# Patient Record
Sex: FEMALE | Race: WHITE | NOT HISPANIC OR LATINO | Employment: UNEMPLOYED | URBAN - METROPOLITAN AREA
[De-identification: names, ages, dates, MRNs, and addresses within clinical notes are randomized per-mention and may not be internally consistent; named-entity substitution may affect disease eponyms.]

---

## 2017-03-22 ENCOUNTER — TRANSCRIBE ORDERS (OUTPATIENT)
Dept: WOMENS IMAGING | Facility: HOSPITAL | Age: 42
End: 2017-03-22

## 2017-03-22 DIAGNOSIS — O09.291: ICD-10-CM

## 2017-03-22 DIAGNOSIS — Z86.79 HISTORY OF ATRIAL FIBRILLATION: ICD-10-CM

## 2017-03-22 DIAGNOSIS — O09.521 AMA (ADVANCED MATERNAL AGE) MULTIGRAVIDA 35+, FIRST TRIMESTER: Primary | ICD-10-CM

## 2017-04-12 ENCOUNTER — OFFICE VISIT (OUTPATIENT)
Dept: OBSTETRICS AND GYNECOLOGY | Facility: HOSPITAL | Age: 42
End: 2017-04-12

## 2017-04-12 ENCOUNTER — HOSPITAL ENCOUNTER (OUTPATIENT)
Dept: WOMENS IMAGING | Facility: HOSPITAL | Age: 42
Discharge: HOME | End: 2017-04-12
Admitting: NURSE PRACTITIONER

## 2017-04-12 VITALS
HEIGHT: 66 IN | BODY MASS INDEX: 36.96 KG/M2 | DIASTOLIC BLOOD PRESSURE: 71 MMHG | SYSTOLIC BLOOD PRESSURE: 114 MMHG | WEIGHT: 230 LBS

## 2017-04-12 DIAGNOSIS — O09.521 AMA (ADVANCED MATERNAL AGE) MULTIGRAVIDA 35+, FIRST TRIMESTER: Primary | ICD-10-CM

## 2017-04-12 DIAGNOSIS — F11.20 OPIOID DEPENDENCE ON MAINTENANCE AGONIST THERAPY, NO SYMPTOMS (HCC): ICD-10-CM

## 2017-04-12 DIAGNOSIS — Z86.79 HISTORY OF ATRIAL FIBRILLATION: ICD-10-CM

## 2017-04-12 DIAGNOSIS — O34.219 PREVIOUS CESAREAN DELIVERY AFFECTING PREGNANCY: ICD-10-CM

## 2017-04-12 DIAGNOSIS — O09.299 PREVIOUS STILLBIRTH OR DEMISE, ANTEPARTUM, UNSPECIFIED TRIMESTER: ICD-10-CM

## 2017-04-12 DIAGNOSIS — O09.291: ICD-10-CM

## 2017-04-12 DIAGNOSIS — O09.521 AMA (ADVANCED MATERNAL AGE) MULTIGRAVIDA 35+, FIRST TRIMESTER: ICD-10-CM

## 2017-04-12 PROBLEM — O09.529 AMA (ADVANCED MATERNAL AGE) MULTIGRAVIDA 35+: Status: ACTIVE | Noted: 2017-04-12

## 2017-04-12 PROCEDURE — 76801 OB US < 14 WKS SINGLE FETUS: CPT

## 2017-04-12 PROCEDURE — 76813 OB US NUCHAL MEAS 1 GEST: CPT | Performed by: OBSTETRICS & GYNECOLOGY

## 2017-04-12 PROCEDURE — 76801 OB US < 14 WKS SINGLE FETUS: CPT | Performed by: OBSTETRICS & GYNECOLOGY

## 2017-04-12 PROCEDURE — 76813 OB US NUCHAL MEAS 1 GEST: CPT

## 2017-04-12 PROCEDURE — 99241 PR OFFICE CONSULTATION NEW/ESTAB PATIENT 15 MIN: CPT | Performed by: OBSTETRICS & GYNECOLOGY

## 2017-04-12 RX ORDER — BUPRENORPHINE 2 MG/1
2 TABLET SUBLINGUAL DAILY
COMMUNITY
End: 2017-10-02

## 2017-04-12 RX ORDER — MONTELUKAST SODIUM 10 MG/1
10 TABLET ORAL NIGHTLY
COMMUNITY
End: 2021-01-01

## 2017-04-12 RX ORDER — ALBUTEROL SULFATE 90 UG/1
2 AEROSOL, METERED RESPIRATORY (INHALATION) EVERY 4 HOURS PRN
COMMUNITY

## 2017-04-12 RX ORDER — PRENATAL WITH FERROUS FUM AND FOLIC ACID 3080; 920; 120; 400; 22; 1.84; 3; 20; 10; 1; 12; 200; 27; 25; 2 [IU]/1; [IU]/1; MG/1; [IU]/1; MG/1; MG/1; MG/1; MG/1; MG/1; MG/1; UG/1; MG/1; MG/1; MG/1; MG/1
TABLET ORAL DAILY
Status: ON HOLD | COMMUNITY
End: 2020-12-02

## 2017-04-18 ENCOUNTER — TELEPHONE (OUTPATIENT)
Dept: WOMENS IMAGING | Facility: HOSPITAL | Age: 42
End: 2017-04-18

## 2017-04-18 NOTE — TELEPHONE ENCOUNTER
----- Message from Douglas A Milligan, MD sent at 4/18/2017  8:30 AM EDT -----  Please infor patient of results and fax to referring provider.

## 2017-05-31 ENCOUNTER — OFFICE VISIT (OUTPATIENT)
Dept: OBSTETRICS AND GYNECOLOGY | Facility: HOSPITAL | Age: 42
End: 2017-05-31

## 2017-05-31 ENCOUNTER — HOSPITAL ENCOUNTER (OUTPATIENT)
Dept: WOMENS IMAGING | Facility: HOSPITAL | Age: 42
Discharge: HOME | End: 2017-05-31
Attending: OBSTETRICS & GYNECOLOGY | Admitting: NURSE PRACTITIONER

## 2017-05-31 VITALS — WEIGHT: 225 LBS | DIASTOLIC BLOOD PRESSURE: 69 MMHG | SYSTOLIC BLOOD PRESSURE: 115 MMHG | BODY MASS INDEX: 36.87 KG/M2

## 2017-05-31 DIAGNOSIS — O09.522 AMA (ADVANCED MATERNAL AGE) MULTIGRAVIDA 35+, SECOND TRIMESTER: Primary | ICD-10-CM

## 2017-05-31 DIAGNOSIS — O34.219 PREVIOUS CESAREAN DELIVERY AFFECTING PREGNANCY: ICD-10-CM

## 2017-05-31 DIAGNOSIS — O09.521 AMA (ADVANCED MATERNAL AGE) MULTIGRAVIDA 35+, FIRST TRIMESTER: ICD-10-CM

## 2017-05-31 DIAGNOSIS — F11.20 OPIOID DEPENDENCE ON MAINTENANCE AGONIST THERAPY, NO SYMPTOMS (HCC): ICD-10-CM

## 2017-05-31 DIAGNOSIS — O09.299 PREVIOUS STILLBIRTH OR DEMISE, ANTEPARTUM, UNSPECIFIED TRIMESTER: ICD-10-CM

## 2017-05-31 PROCEDURE — 76811 OB US DETAILED SNGL FETUS: CPT | Performed by: OBSTETRICS & GYNECOLOGY

## 2017-05-31 PROCEDURE — 76811 OB US DETAILED SNGL FETUS: CPT

## 2017-06-28 ENCOUNTER — HOSPITAL ENCOUNTER (OUTPATIENT)
Dept: WOMENS IMAGING | Facility: HOSPITAL | Age: 42
Discharge: HOME | End: 2017-06-28
Attending: OBSTETRICS & GYNECOLOGY | Admitting: NURSE PRACTITIONER

## 2017-06-28 ENCOUNTER — OFFICE VISIT (OUTPATIENT)
Dept: OBSTETRICS AND GYNECOLOGY | Facility: HOSPITAL | Age: 42
End: 2017-06-28

## 2017-06-28 VITALS — SYSTOLIC BLOOD PRESSURE: 112 MMHG | DIASTOLIC BLOOD PRESSURE: 69 MMHG | WEIGHT: 223 LBS | BODY MASS INDEX: 36.54 KG/M2

## 2017-06-28 DIAGNOSIS — F11.20 OPIOID DEPENDENCE ON MAINTENANCE AGONIST THERAPY, NO SYMPTOMS (HCC): ICD-10-CM

## 2017-06-28 DIAGNOSIS — O09.292 PREVIOUS STILLBIRTH OR DEMISE, ANTEPARTUM, SECOND TRIMESTER: ICD-10-CM

## 2017-06-28 DIAGNOSIS — O09.522 AMA (ADVANCED MATERNAL AGE) MULTIGRAVIDA 35+, SECOND TRIMESTER: ICD-10-CM

## 2017-06-28 DIAGNOSIS — O34.219 PREVIOUS CESAREAN DELIVERY AFFECTING PREGNANCY: ICD-10-CM

## 2017-06-28 DIAGNOSIS — O09.522 AMA (ADVANCED MATERNAL AGE) MULTIGRAVIDA 35+, SECOND TRIMESTER: Primary | ICD-10-CM

## 2017-06-28 PROCEDURE — 76816 OB US FOLLOW-UP PER FETUS: CPT | Performed by: OBSTETRICS & GYNECOLOGY

## 2017-06-28 PROCEDURE — 76816 OB US FOLLOW-UP PER FETUS: CPT

## 2017-06-28 NOTE — PROGRESS NOTES
"Pt denies problems with pregnancy. Reports \"having trouble with asthma\" \"need nebulizer machine and albuterol.\" states \"my doctor won't give me my subutex anymore because he said I lied to him\". \"have been weaning myself off \". To see OB July 13 and will do glucose testing at that time.  "

## 2017-08-05 ENCOUNTER — HOSPITAL ENCOUNTER (EMERGENCY)
Facility: HOSPITAL | Age: 42
Discharge: HOME | End: 2017-08-05
Attending: FAMILY MEDICINE | Admitting: FAMILY MEDICINE

## 2017-08-05 VITALS
OXYGEN SATURATION: 98 % | DIASTOLIC BLOOD PRESSURE: 70 MMHG | RESPIRATION RATE: 18 BRPM | SYSTOLIC BLOOD PRESSURE: 100 MMHG | WEIGHT: 210 LBS | BODY MASS INDEX: 31.83 KG/M2 | HEART RATE: 70 BPM | TEMPERATURE: 98 F | HEIGHT: 68 IN

## 2017-08-05 DIAGNOSIS — T81.31XA WOUND DEHISCENCE, INITIAL ENCOUNTER: Primary | ICD-10-CM

## 2017-08-05 PROCEDURE — 99283 EMERGENCY DEPT VISIT LOW MDM: CPT

## 2017-08-05 RX ORDER — CLINDAMYCIN HYDROCHLORIDE 150 MG/1
300 CAPSULE ORAL EVERY 6 HOURS SCHEDULED
Status: DISCONTINUED | OUTPATIENT
Start: 2017-08-05 | End: 2017-08-05 | Stop reason: HOSPADM

## 2017-08-05 RX ORDER — CLINDAMYCIN HYDROCHLORIDE 300 MG/1
300 CAPSULE ORAL 3 TIMES DAILY
Qty: 30 CAPSULE | Refills: 0 | Status: SHIPPED | OUTPATIENT
Start: 2017-08-05 | End: 2017-08-15

## 2017-08-05 RX ADMIN — CLINDAMYCIN HYDROCHLORIDE 300 MG: 150 CAPSULE ORAL at 19:08
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
## 2017-08-30 ENCOUNTER — APPOINTMENT (OUTPATIENT)
Dept: WOMENS IMAGING | Facility: HOSPITAL | Age: 42
End: 2017-08-30
Attending: OBSTETRICS & GYNECOLOGY

## 2017-09-11 ENCOUNTER — APPOINTMENT (OUTPATIENT)
Dept: WOMENS IMAGING | Facility: HOSPITAL | Age: 42
End: 2017-09-11
Attending: OBSTETRICS & GYNECOLOGY

## 2017-10-02 ENCOUNTER — OFFICE VISIT (OUTPATIENT)
Dept: OBSTETRICS AND GYNECOLOGY | Facility: HOSPITAL | Age: 42
End: 2017-10-02

## 2017-10-02 ENCOUNTER — HOSPITAL ENCOUNTER (OUTPATIENT)
Dept: WOMENS IMAGING | Facility: HOSPITAL | Age: 42
Discharge: HOME | End: 2017-10-02
Admitting: NURSE PRACTITIONER

## 2017-10-02 VITALS — BODY MASS INDEX: 34.52 KG/M2 | DIASTOLIC BLOOD PRESSURE: 70 MMHG | SYSTOLIC BLOOD PRESSURE: 112 MMHG | WEIGHT: 227 LBS

## 2017-10-02 DIAGNOSIS — O36.5990 POOR FETAL GROWTH AFFECTING MANAGEMENT OF MOTHER, ANTEPARTUM, SINGLE OR UNSPECIFIED FETUS: ICD-10-CM

## 2017-10-02 DIAGNOSIS — O09.522 AMA (ADVANCED MATERNAL AGE) MULTIGRAVIDA 35+, SECOND TRIMESTER: ICD-10-CM

## 2017-10-02 DIAGNOSIS — F11.20 OPIOID DEPENDENCE ON MAINTENANCE AGONIST THERAPY, NO SYMPTOMS (HCC): Primary | ICD-10-CM

## 2017-10-02 DIAGNOSIS — O34.219 PREVIOUS CESAREAN DELIVERY AFFECTING PREGNANCY: ICD-10-CM

## 2017-10-02 DIAGNOSIS — O09.299 PREVIOUS STILLBIRTH OR DEMISE, ANTEPARTUM: ICD-10-CM

## 2017-10-02 DIAGNOSIS — O09.523 ELDERLY MULTIGRAVIDA IN THIRD TRIMESTER: ICD-10-CM

## 2017-10-02 PROCEDURE — 76819 FETAL BIOPHYS PROFIL W/O NST: CPT

## 2017-10-02 PROCEDURE — 76816 OB US FOLLOW-UP PER FETUS: CPT | Performed by: OBSTETRICS & GYNECOLOGY

## 2017-10-02 PROCEDURE — 76819 FETAL BIOPHYS PROFIL W/O NST: CPT | Performed by: OBSTETRICS & GYNECOLOGY

## 2017-10-02 PROCEDURE — 76816 OB US FOLLOW-UP PER FETUS: CPT

## 2017-10-02 NOTE — PROGRESS NOTES
Pt states she weaned herself off the Subutex. States she has not had any in 4 days.  Denies any complaints.

## 2018-07-08 ENCOUNTER — APPOINTMENT (OUTPATIENT)
Dept: GENERAL RADIOLOGY | Facility: HOSPITAL | Age: 43
End: 2018-07-08

## 2018-07-08 ENCOUNTER — HOSPITAL ENCOUNTER (EMERGENCY)
Facility: HOSPITAL | Age: 43
Discharge: HOME | End: 2018-07-09
Attending: EMERGENCY MEDICINE | Admitting: EMERGENCY MEDICINE

## 2018-07-08 DIAGNOSIS — R07.9 CHEST PAIN, UNSPECIFIED TYPE: Primary | ICD-10-CM

## 2018-07-08 DIAGNOSIS — R51.9 ACUTE NONINTRACTABLE HEADACHE, UNSPECIFIED HEADACHE TYPE: ICD-10-CM

## 2018-07-08 LAB
BACTERIA UR QL AUTO: NORMAL /HPF
BASOPHILS # BLD AUTO: 0.06 10*3/MM3 (ref 0–0.3)
BASOPHILS NFR BLD AUTO: 0.6 % (ref 0–2)
BILIRUB UR QL STRIP: NEGATIVE
CLARITY UR: CLEAR
COLOR UR: YELLOW
DEPRECATED RDW RBC AUTO: 50.3 FL (ref 37–54)
EOSINOPHIL # BLD AUTO: 0.64 10*3/MM3 (ref 0–0.7)
EOSINOPHIL NFR BLD AUTO: 5.9 % (ref 0–5)
ERYTHROCYTE [DISTWIDTH] IN BLOOD BY AUTOMATED COUNT: 16.2 % (ref 11.5–14.5)
GLUCOSE UR STRIP-MCNC: NEGATIVE MG/DL
HCT VFR BLD AUTO: 37.5 % (ref 37–47)
HGB BLD-MCNC: 12 G/DL (ref 12–16)
HGB UR QL STRIP.AUTO: ABNORMAL
HYALINE CASTS UR QL AUTO: NORMAL /LPF
IMM GRANULOCYTES # BLD: 0.02 10*3/MM3 (ref 0–0.03)
IMM GRANULOCYTES NFR BLD: 0.2 % (ref 0–0.5)
KETONES UR QL STRIP: NEGATIVE
LEUKOCYTE ESTERASE UR QL STRIP.AUTO: NEGATIVE
LYMPHOCYTES # BLD AUTO: 3.07 10*3/MM3 (ref 1–3)
LYMPHOCYTES NFR BLD AUTO: 28.2 % (ref 21–51)
MCH RBC QN AUTO: 27.3 PG (ref 27–33)
MCHC RBC AUTO-ENTMCNC: 32 G/DL (ref 33–37)
MCV RBC AUTO: 85.2 FL (ref 80–94)
MONOCYTES # BLD AUTO: 0.98 10*3/MM3 (ref 0.1–0.9)
MONOCYTES NFR BLD AUTO: 9 % (ref 0–10)
NEUTROPHILS # BLD AUTO: 6.11 10*3/MM3 (ref 1.4–6.5)
NEUTROPHILS NFR BLD AUTO: 56.1 % (ref 30–70)
NITRITE UR QL STRIP: NEGATIVE
PH UR STRIP.AUTO: 7.5 [PH] (ref 5–8)
PLATELET # BLD AUTO: 296 10*3/MM3 (ref 130–400)
PMV BLD AUTO: 11.5 FL (ref 6–10)
PROT UR QL STRIP: NEGATIVE
RBC # BLD AUTO: 4.4 10*6/MM3 (ref 4.2–5.4)
RBC # UR: NORMAL /HPF
REF LAB TEST METHOD: NORMAL
SP GR UR STRIP: 1.01 (ref 1–1.03)
SQUAMOUS #/AREA URNS HPF: NORMAL /HPF
UROBILINOGEN UR QL STRIP: ABNORMAL
WBC NRBC COR # BLD: 10.88 10*3/MM3 (ref 4.5–12.5)
WBC UR QL AUTO: NORMAL /HPF

## 2018-07-08 PROCEDURE — 80307 DRUG TEST PRSMV CHEM ANLYZR: CPT | Performed by: NURSE PRACTITIONER

## 2018-07-08 PROCEDURE — 71045 X-RAY EXAM CHEST 1 VIEW: CPT

## 2018-07-08 PROCEDURE — 93010 ELECTROCARDIOGRAM REPORT: CPT | Performed by: INTERNAL MEDICINE

## 2018-07-08 PROCEDURE — 84443 ASSAY THYROID STIM HORMONE: CPT | Performed by: NURSE PRACTITIONER

## 2018-07-08 PROCEDURE — 80053 COMPREHEN METABOLIC PANEL: CPT | Performed by: NURSE PRACTITIONER

## 2018-07-08 PROCEDURE — 84484 ASSAY OF TROPONIN QUANT: CPT | Performed by: NURSE PRACTITIONER

## 2018-07-08 PROCEDURE — 84439 ASSAY OF FREE THYROXINE: CPT | Performed by: NURSE PRACTITIONER

## 2018-07-08 PROCEDURE — 85025 COMPLETE CBC W/AUTO DIFF WBC: CPT | Performed by: NURSE PRACTITIONER

## 2018-07-08 PROCEDURE — 99284 EMERGENCY DEPT VISIT MOD MDM: CPT

## 2018-07-08 PROCEDURE — 71045 X-RAY EXAM CHEST 1 VIEW: CPT | Performed by: RADIOLOGY

## 2018-07-08 PROCEDURE — 81001 URINALYSIS AUTO W/SCOPE: CPT | Performed by: NURSE PRACTITIONER

## 2018-07-08 PROCEDURE — 83690 ASSAY OF LIPASE: CPT | Performed by: NURSE PRACTITIONER

## 2018-07-08 PROCEDURE — 93005 ELECTROCARDIOGRAM TRACING: CPT | Performed by: EMERGENCY MEDICINE

## 2018-07-09 ENCOUNTER — APPOINTMENT (OUTPATIENT)
Dept: CT IMAGING | Facility: HOSPITAL | Age: 43
End: 2018-07-09

## 2018-07-09 VITALS
OXYGEN SATURATION: 98 % | BODY MASS INDEX: 36.29 KG/M2 | SYSTOLIC BLOOD PRESSURE: 115 MMHG | DIASTOLIC BLOOD PRESSURE: 65 MMHG | HEIGHT: 69 IN | HEART RATE: 82 BPM | WEIGHT: 245 LBS | RESPIRATION RATE: 18 BRPM | TEMPERATURE: 98.2 F

## 2018-07-09 LAB
6-ACETYL MORPHINE: NEGATIVE
ALBUMIN SERPL-MCNC: 4.1 G/DL (ref 3.5–5)
ALBUMIN/GLOB SERPL: 1.1 G/DL (ref 1.5–2.5)
ALP SERPL-CCNC: 98 U/L (ref 35–104)
ALT SERPL W P-5'-P-CCNC: 11 U/L (ref 10–36)
AMPHET+METHAMPHET UR QL: NEGATIVE
ANION GAP SERPL CALCULATED.3IONS-SCNC: 6.6 MMOL/L (ref 3.6–11.2)
AST SERPL-CCNC: 16 U/L (ref 10–30)
BARBITURATES UR QL SCN: NEGATIVE
BENZODIAZ UR QL SCN: NEGATIVE
BILIRUB SERPL-MCNC: 0.3 MG/DL (ref 0.2–1.8)
BUN BLD-MCNC: 8 MG/DL (ref 7–21)
BUN/CREAT SERPL: 10 (ref 7–25)
BUPRENORPHINE SERPL-MCNC: NEGATIVE NG/ML
CALCIUM SPEC-SCNC: 9 MG/DL (ref 7.7–10)
CANNABINOIDS SERPL QL: NEGATIVE
CHLORIDE SERPL-SCNC: 108 MMOL/L (ref 99–112)
CO2 SERPL-SCNC: 26.4 MMOL/L (ref 24.3–31.9)
COCAINE UR QL: NEGATIVE
CREAT BLD-MCNC: 0.8 MG/DL (ref 0.43–1.29)
GFR SERPL CREATININE-BSD FRML MDRD: 79 ML/MIN/1.73
GLOBULIN UR ELPH-MCNC: 3.7 GM/DL
GLUCOSE BLD-MCNC: 135 MG/DL (ref 70–110)
LIPASE SERPL-CCNC: 41 U/L (ref 13–60)
METHADONE UR QL SCN: NEGATIVE
OPIATES UR QL: NEGATIVE
OSMOLALITY SERPL CALC.SUM OF ELEC: 281.6 MOSM/KG (ref 273–305)
OXYCODONE UR QL SCN: NEGATIVE
PCP UR QL SCN: NEGATIVE
POTASSIUM BLD-SCNC: 4 MMOL/L (ref 3.5–5.3)
PROT SERPL-MCNC: 7.8 G/DL (ref 6–8)
SODIUM BLD-SCNC: 141 MMOL/L (ref 135–153)
T4 FREE SERPL-MCNC: 1 NG/DL (ref 0.89–1.76)
TROPONIN I SERPL-MCNC: <0.006 NG/ML
TSH SERPL DL<=0.05 MIU/L-ACNC: 2.84 MIU/ML (ref 0.55–4.78)

## 2018-07-09 PROCEDURE — 70450 CT HEAD/BRAIN W/O DYE: CPT | Performed by: RADIOLOGY

## 2018-07-09 PROCEDURE — 70450 CT HEAD/BRAIN W/O DYE: CPT

## 2018-07-09 NOTE — ED PROVIDER NOTES
"Subjective     History provided by:  Patient   used: No    Chest Pain   Pain location:  L chest  Pain quality: aching and sharp    Pain radiates to:  L arm  Pain severity:  Moderate  Onset quality:  Gradual  Duration:  7 days  Timing:  Intermittent  Progression:  Waxing and waning  Chronicity:  Recurrent  Context: at rest    Relieved by:  Nothing  Worsened by:  Nothing  Ineffective treatments:  None tried  Associated symptoms: headache, palpitations and syncope    Risk factors: smoking        Review of Systems   Constitutional: Negative.    Eyes: Negative.    Respiratory: Negative.    Cardiovascular: Positive for chest pain, palpitations and syncope.   Gastrointestinal: Negative.    Endocrine: Negative.    Genitourinary: Negative.    Musculoskeletal: Negative.    Skin: Negative.    Allergic/Immunologic: Negative.    Neurological: Positive for syncope and headaches.   Hematological: Negative.    Psychiatric/Behavioral: Negative.    All other systems reviewed and are negative.      Past Medical History:   Diagnosis Date   • Asthma    • Diabetes mellitus (CMS/HCC)     GDM last pregnancy   • History of atrial fibrillation    • Substance abuse        No Known Allergies    Past Surgical History:   Procedure Laterality Date   •  SECTION      x 3   • HIP FRACTURE SURGERY  2017       History reviewed. No pertinent family history.    Social History     Social History   • Marital status:      Social History Main Topics   • Smoking status: Never Smoker   • Smokeless tobacco: Never Used   • Alcohol use No   • Drug use: Yes     Types: Hydrocodone, Codeine      Comment: \"stopped around 6 weeks pregnant\"   • Sexual activity: Defer     Other Topics Concern   • Not on file           Objective   Physical Exam   Constitutional: She is oriented to person, place, and time. She appears well-developed and well-nourished.   HENT:   Head: Normocephalic.   Eyes: EOM are normal. Pupils are equal, round, " and reactive to light.   Neck: Normal range of motion. Neck supple.   Cardiovascular: Normal rate, regular rhythm, normal heart sounds and intact distal pulses.    Abdominal: Soft. Bowel sounds are normal.   Musculoskeletal: Normal range of motion.   Neurological: She is alert and oriented to person, place, and time.   Skin: Skin is warm. Capillary refill takes less than 2 seconds.   Psychiatric: She has a normal mood and affect. Her behavior is normal. Judgment and thought content normal.       Procedures           ED Course  ED Course as of Jul 09 0125   Mon Jul 09, 2018   0113 No definite acute intracranial abnormality CT Head Without Contrast [KK]   0114 NSR 80 bpm. No evidence of acute ischemia ECG 12 Lead [KK]      ED Course User Index  [KK] PEDRO Ascencio                HEART Score (for prediction of 6-week risk of major adverse cardiac event) reviewed and/or performed as part of the patient evaluation and treatment planning process.  The result associated with this review/performance is: 1       MDM  Number of Diagnoses or Management Options  Acute nonintractable headache, unspecified headache type: new and requires workup  Chest pain, unspecified type: new and requires workup     Amount and/or Complexity of Data Reviewed  Clinical lab tests: ordered and reviewed  Tests in the radiology section of CPT®: reviewed and ordered  Tests in the medicine section of CPT®: reviewed and ordered    Risk of Complications, Morbidity, and/or Mortality  Presenting problems: moderate  Diagnostic procedures: moderate  Management options: moderate    Patient Progress  Patient progress: improved        Final diagnoses:   Chest pain, unspecified type   Acute nonintractable headache, unspecified headache type            PEDRO Ascencio  07/09/18 0125

## 2018-07-10 ENCOUNTER — TRANSCRIBE ORDERS (OUTPATIENT)
Dept: ADMINISTRATIVE | Facility: HOSPITAL | Age: 43
End: 2018-07-10

## 2018-07-10 DIAGNOSIS — R07.9 CHEST PAIN, UNSPECIFIED TYPE: Primary | ICD-10-CM

## 2018-10-23 ENCOUNTER — APPOINTMENT (OUTPATIENT)
Dept: CT IMAGING | Facility: HOSPITAL | Age: 43
End: 2018-10-23

## 2018-10-23 ENCOUNTER — HOSPITAL ENCOUNTER (EMERGENCY)
Facility: HOSPITAL | Age: 43
Discharge: HOME | End: 2018-10-23
Attending: EMERGENCY MEDICINE | Admitting: EMERGENCY MEDICINE

## 2018-10-23 VITALS
HEIGHT: 64 IN | WEIGHT: 288 LBS | RESPIRATION RATE: 20 BRPM | DIASTOLIC BLOOD PRESSURE: 71 MMHG | HEART RATE: 89 BPM | SYSTOLIC BLOOD PRESSURE: 122 MMHG | TEMPERATURE: 98.9 F | OXYGEN SATURATION: 97 % | BODY MASS INDEX: 49.17 KG/M2

## 2018-10-23 DIAGNOSIS — N30.01 ACUTE CYSTITIS WITH HEMATURIA: ICD-10-CM

## 2018-10-23 DIAGNOSIS — R10.31 RIGHT LOWER QUADRANT ABDOMINAL PAIN: Primary | ICD-10-CM

## 2018-10-23 LAB
ALBUMIN SERPL-MCNC: 4.2 G/DL (ref 3.5–5)
ALBUMIN/GLOB SERPL: 1.2 G/DL (ref 1.5–2.5)
ALP SERPL-CCNC: 91 U/L (ref 35–104)
ALT SERPL W P-5'-P-CCNC: 7 U/L (ref 10–36)
ANION GAP SERPL CALCULATED.3IONS-SCNC: 6.6 MMOL/L (ref 3.6–11.2)
AST SERPL-CCNC: 21 U/L (ref 10–30)
BACTERIA UR QL AUTO: ABNORMAL /HPF
BASOPHILS # BLD AUTO: 0.07 10*3/MM3 (ref 0–0.3)
BASOPHILS NFR BLD AUTO: 0.5 % (ref 0–2)
BILIRUB SERPL-MCNC: 0.3 MG/DL (ref 0.2–1.8)
BILIRUB UR QL STRIP: NEGATIVE
BUN BLD-MCNC: 9 MG/DL (ref 7–21)
BUN/CREAT SERPL: 11.8 (ref 7–25)
CALCIUM SPEC-SCNC: 9.2 MG/DL (ref 7.7–10)
CHLORIDE SERPL-SCNC: 107 MMOL/L (ref 99–112)
CLARITY UR: ABNORMAL
CO2 SERPL-SCNC: 24.4 MMOL/L (ref 24.3–31.9)
COLOR UR: YELLOW
CREAT BLD-MCNC: 0.76 MG/DL (ref 0.43–1.29)
DEPRECATED RDW RBC AUTO: 48.7 FL (ref 37–54)
EOSINOPHIL # BLD AUTO: 0.54 10*3/MM3 (ref 0–0.7)
EOSINOPHIL NFR BLD AUTO: 3.7 % (ref 0–5)
ERYTHROCYTE [DISTWIDTH] IN BLOOD BY AUTOMATED COUNT: 15.8 % (ref 11.5–14.5)
GFR SERPL CREATININE-BSD FRML MDRD: 83 ML/MIN/1.73
GLOBULIN UR ELPH-MCNC: 3.6 GM/DL
GLUCOSE BLD-MCNC: 117 MG/DL (ref 70–110)
GLUCOSE UR STRIP-MCNC: NEGATIVE MG/DL
HCT VFR BLD AUTO: 39.6 % (ref 37–47)
HGB BLD-MCNC: 12.3 G/DL (ref 12–16)
HGB UR QL STRIP.AUTO: NEGATIVE
HYALINE CASTS UR QL AUTO: ABNORMAL /LPF
IMM GRANULOCYTES # BLD: 0.03 10*3/MM3 (ref 0–0.03)
IMM GRANULOCYTES NFR BLD: 0.2 % (ref 0–0.5)
KETONES UR QL STRIP: NEGATIVE
LEUKOCYTE ESTERASE UR QL STRIP.AUTO: ABNORMAL
LIPASE SERPL-CCNC: 32 U/L (ref 13–60)
LYMPHOCYTES # BLD AUTO: 2.51 10*3/MM3 (ref 1–3)
LYMPHOCYTES NFR BLD AUTO: 17.3 % (ref 21–51)
MCH RBC QN AUTO: 26.1 PG (ref 27–33)
MCHC RBC AUTO-ENTMCNC: 31.1 G/DL (ref 33–37)
MCV RBC AUTO: 83.9 FL (ref 80–94)
MONOCYTES # BLD AUTO: 1.07 10*3/MM3 (ref 0.1–0.9)
MONOCYTES NFR BLD AUTO: 7.4 % (ref 0–10)
NEUTROPHILS # BLD AUTO: 10.28 10*3/MM3 (ref 1.4–6.5)
NEUTROPHILS NFR BLD AUTO: 70.9 % (ref 30–70)
NITRITE UR QL STRIP: NEGATIVE
OSMOLALITY SERPL CALC.SUM OF ELEC: 275.4 MOSM/KG (ref 273–305)
PH UR STRIP.AUTO: 6 [PH] (ref 5–8)
PLATELET # BLD AUTO: 292 10*3/MM3 (ref 130–400)
PMV BLD AUTO: 11.8 FL (ref 6–10)
POTASSIUM BLD-SCNC: 3.7 MMOL/L (ref 3.5–5.3)
PROT SERPL-MCNC: 7.8 G/DL (ref 6–8)
PROT UR QL STRIP: NEGATIVE
RBC # BLD AUTO: 4.72 10*6/MM3 (ref 4.2–5.4)
RBC # UR: ABNORMAL /HPF
REF LAB TEST METHOD: ABNORMAL
SODIUM BLD-SCNC: 138 MMOL/L (ref 135–153)
SP GR UR STRIP: 1.02 (ref 1–1.03)
SQUAMOUS #/AREA URNS HPF: ABNORMAL /HPF
UROBILINOGEN UR QL STRIP: ABNORMAL
WBC NRBC COR # BLD: 14.5 10*3/MM3 (ref 4.5–12.5)
WBC UR QL AUTO: ABNORMAL /HPF

## 2018-10-23 PROCEDURE — 81001 URINALYSIS AUTO W/SCOPE: CPT | Performed by: PHYSICIAN ASSISTANT

## 2018-10-23 PROCEDURE — 74177 CT ABD & PELVIS W/CONTRAST: CPT

## 2018-10-23 PROCEDURE — 80053 COMPREHEN METABOLIC PANEL: CPT | Performed by: PHYSICIAN ASSISTANT

## 2018-10-23 PROCEDURE — 74177 CT ABD & PELVIS W/CONTRAST: CPT | Performed by: RADIOLOGY

## 2018-10-23 PROCEDURE — 99283 EMERGENCY DEPT VISIT LOW MDM: CPT

## 2018-10-23 PROCEDURE — 85025 COMPLETE CBC W/AUTO DIFF WBC: CPT | Performed by: PHYSICIAN ASSISTANT

## 2018-10-23 PROCEDURE — 25010000002 IOPAMIDOL 61 % SOLUTION: Performed by: EMERGENCY MEDICINE

## 2018-10-23 PROCEDURE — 83690 ASSAY OF LIPASE: CPT | Performed by: PHYSICIAN ASSISTANT

## 2018-10-23 RX ORDER — NITROFURANTOIN 25; 75 MG/1; MG/1
100 CAPSULE ORAL 2 TIMES DAILY
Qty: 14 CAPSULE | Refills: 0 | Status: SHIPPED | OUTPATIENT
Start: 2018-10-23 | End: 2018-10-30

## 2018-10-23 RX ORDER — SODIUM CHLORIDE 0.9 % (FLUSH) 0.9 %
10 SYRINGE (ML) INJECTION AS NEEDED
Status: DISCONTINUED | OUTPATIENT
Start: 2018-10-23 | End: 2018-10-23 | Stop reason: HOSPADM

## 2018-10-23 RX ADMIN — SODIUM CHLORIDE 1000 ML: 9 INJECTION, SOLUTION INTRAVENOUS at 11:21

## 2018-10-23 RX ADMIN — IOPAMIDOL 100 ML: 612 INJECTION, SOLUTION INTRAVENOUS at 14:25

## 2018-10-23 NOTE — ED PROVIDER NOTES
Subjective   This is a 43-year-old female comes in with chief complaint right lower quadrant abdominal pain for the past 5 days.  Patient states she was seen by her primary care physician at [facility] where she was found have an elevated blood count.  Advised To Emergency Department for Further Evaluation of Her Pain.  Patient Describes the Pain As Sharp, Stabbing Pain.  Has Had Associated Nausea without Vomiting.  Denies Any Associated Fever, Chills, Body Aches.        History provided by:  Patient   used: No    Abdominal Pain   Pain location:  RLQ  Pain quality: sharp and stabbing    Pain radiates to:  RLQ  Pain severity:  Moderate  Onset quality:  Sudden  Duration:  5 days  Timing:  Intermittent  Progression:  Worsening  Chronicity:  New  Context: not alcohol use, not awakening from sleep, not diet changes, not eating, not medication withdrawal, not previous surgeries, not recent illness, not recent sexual activity, not retching and not sick contacts    Relieved by:  Nothing  Worsened by:  Nothing  Ineffective treatments:  None tried  Associated symptoms: nausea    Associated symptoms: no anorexia, no belching, no chest pain, no chills, no constipation, no fever, no flatus and no vomiting    Risk factors: no alcohol abuse, has not had multiple surgeries, no NSAID use, not pregnant and no recent hospitalization        Review of Systems   Constitutional: Negative for chills and fever.   Cardiovascular: Negative for chest pain.   Gastrointestinal: Positive for abdominal distention, abdominal pain and nausea. Negative for anorexia, constipation, flatus and vomiting.   All other systems reviewed and are negative.      Past Medical History:   Diagnosis Date   • Asthma    • Diabetes mellitus (CMS/HCC)     GDM last pregnancy   • History of atrial fibrillation    • Substance abuse        No Known Allergies    Past Surgical History:   Procedure Laterality Date   •  SECTION      x 3   •  "HIP FRACTURE SURGERY  07/03/2017       No family history on file.    Social History     Social History   • Marital status:      Social History Main Topics   • Smoking status: Never Smoker   • Smokeless tobacco: Never Used   • Alcohol use No   • Drug use: Yes     Types: Hydrocodone, Codeine      Comment: \"stopped around 6 weeks pregnant\"   • Sexual activity: Defer     Other Topics Concern   • Not on file           Objective   Physical Exam   Constitutional: She is oriented to person, place, and time. She appears well-developed and well-nourished.   HENT:   Head: Normocephalic.   Right Ear: External ear normal.   Left Ear: External ear normal.   Nose: Nose normal.   Mouth/Throat: Oropharynx is clear and moist. No oropharyngeal exudate.   Eyes: Pupils are equal, round, and reactive to light. Conjunctivae and EOM are normal. Right eye exhibits no discharge. Left eye exhibits no discharge. No scleral icterus.   Neck: Normal range of motion. Neck supple. No JVD present. No tracheal deviation present. No thyromegaly present.   Cardiovascular: Normal rate, regular rhythm, normal heart sounds and intact distal pulses.  Exam reveals no friction rub.    No murmur heard.  Pulmonary/Chest: Effort normal and breath sounds normal. No stridor. No respiratory distress. She has no wheezes. She has no rales.   Abdominal: Soft. Bowel sounds are normal. She exhibits no distension and no mass. There is no hepatosplenomegaly, splenomegaly or hepatomegaly. There is tenderness in the right lower quadrant. There is rebound and guarding.   Musculoskeletal: Normal range of motion. She exhibits no edema, tenderness or deformity.   Lymphadenopathy:     She has no cervical adenopathy.   Neurological: She is alert and oriented to person, place, and time. She displays normal reflexes. No cranial nerve deficit or sensory deficit. She exhibits normal muscle tone. Coordination normal.   Skin: Skin is warm. Capillary refill takes less than 2 " seconds. No rash noted. She is not diaphoretic. No erythema.   Psychiatric: She has a normal mood and affect. Her behavior is normal. Judgment and thought content normal.   Nursing note and vitals reviewed.      Procedures           ED Course  ED Course as of Oct 23 1457   Tue Oct 23, 2018   1119 Patient being evaluated for possible appendicitis.  We'll have by mouth and IV contrast.  Patient does have moderate tenderness in right lower quadrant on palpation.  She is stable at this time.  []   1453 : No definitive source for the patient's symptoms.       []   1454 Patient was evaluated for possible appendicitis.  This was ruled out by CT scan with IV and by mouth contrast.  Patient will be discharged.  []      ED Course User Index  [] Ean Bose PA-C                  [facility]      Final diagnoses:   Right lower quadrant abdominal pain   Acute cystitis with hematuria            Ean Bose PA-C  10/23/18 1457

## 2019-01-01 ENCOUNTER — APPOINTMENT (OUTPATIENT)
Dept: GENERAL RADIOLOGY | Facility: HOSPITAL | Age: 44
End: 2019-01-01

## 2019-01-01 ENCOUNTER — HOSPITAL ENCOUNTER (EMERGENCY)
Facility: HOSPITAL | Age: 44
Discharge: HOME | End: 2019-01-01
Attending: EMERGENCY MEDICINE | Admitting: EMERGENCY MEDICINE

## 2019-01-01 ENCOUNTER — APPOINTMENT (OUTPATIENT)
Dept: CT IMAGING | Facility: HOSPITAL | Age: 44
End: 2019-01-01

## 2019-01-01 VITALS
RESPIRATION RATE: 16 BRPM | DIASTOLIC BLOOD PRESSURE: 84 MMHG | TEMPERATURE: 97.4 F | OXYGEN SATURATION: 99 % | SYSTOLIC BLOOD PRESSURE: 118 MMHG | HEIGHT: 63 IN | BODY MASS INDEX: 46.07 KG/M2 | HEART RATE: 71 BPM | WEIGHT: 260 LBS

## 2019-01-01 DIAGNOSIS — F41.9 ANXIETY: ICD-10-CM

## 2019-01-01 DIAGNOSIS — J18.9 PNEUMONIA OF LEFT LOWER LOBE DUE TO INFECTIOUS ORGANISM: Primary | ICD-10-CM

## 2019-01-01 DIAGNOSIS — E87.6 HYPOKALEMIA: ICD-10-CM

## 2019-01-01 LAB
6-ACETYL MORPHINE: NEGATIVE
ALBUMIN SERPL-MCNC: 4.3 G/DL (ref 3.5–5)
ALBUMIN/GLOB SERPL: 1.3 G/DL (ref 1.5–2.5)
ALP SERPL-CCNC: 81 U/L (ref 35–104)
ALT SERPL W P-5'-P-CCNC: 15 U/L (ref 10–36)
AMPHET+METHAMPHET UR QL: NEGATIVE
AMYLASE SERPL-CCNC: 39 U/L (ref 28–100)
ANION GAP SERPL CALCULATED.3IONS-SCNC: 7.6 MMOL/L (ref 3.6–11.2)
AST SERPL-CCNC: 19 U/L (ref 10–30)
B-HCG UR QL: NEGATIVE
BARBITURATES UR QL SCN: NEGATIVE
BASOPHILS # BLD AUTO: 0.04 10*3/MM3 (ref 0–0.3)
BASOPHILS NFR BLD AUTO: 0.4 % (ref 0–2)
BENZODIAZ UR QL SCN: NEGATIVE
BILIRUB SERPL-MCNC: 0.5 MG/DL (ref 0.2–1.8)
BILIRUB UR QL STRIP: NEGATIVE
BUN BLD-MCNC: 7 MG/DL (ref 7–21)
BUN/CREAT SERPL: 9 (ref 7–25)
BUPRENORPHINE SERPL-MCNC: NEGATIVE NG/ML
CALCIUM SPEC-SCNC: 9 MG/DL (ref 7.7–10)
CANNABINOIDS SERPL QL: NEGATIVE
CHLORIDE SERPL-SCNC: 107 MMOL/L (ref 99–112)
CLARITY UR: CLEAR
CO2 SERPL-SCNC: 26.4 MMOL/L (ref 24.3–31.9)
COCAINE UR QL: NEGATIVE
COLOR UR: YELLOW
CREAT BLD-MCNC: 0.78 MG/DL (ref 0.43–1.29)
DEPRECATED RDW RBC AUTO: 46.5 FL (ref 37–54)
EOSINOPHIL # BLD AUTO: 0.17 10*3/MM3 (ref 0–0.7)
EOSINOPHIL NFR BLD AUTO: 1.6 % (ref 0–5)
ERYTHROCYTE [DISTWIDTH] IN BLOOD BY AUTOMATED COUNT: 15.9 % (ref 11.5–14.5)
GFR SERPL CREATININE-BSD FRML MDRD: 81 ML/MIN/1.73
GLOBULIN UR ELPH-MCNC: 3.3 GM/DL
GLUCOSE BLD-MCNC: 89 MG/DL (ref 70–110)
GLUCOSE UR STRIP-MCNC: NEGATIVE MG/DL
HCT VFR BLD AUTO: 35.3 % (ref 37–47)
HGB BLD-MCNC: 11.1 G/DL (ref 12–16)
HGB UR QL STRIP.AUTO: NEGATIVE
IMM GRANULOCYTES # BLD AUTO: 0.03 10*3/MM3 (ref 0–0.03)
IMM GRANULOCYTES NFR BLD AUTO: 0.3 % (ref 0–0.5)
KETONES UR QL STRIP: NEGATIVE
LEUKOCYTE ESTERASE UR QL STRIP.AUTO: NEGATIVE
LIPASE SERPL-CCNC: 31 U/L (ref 13–60)
LYMPHOCYTES # BLD AUTO: 2.35 10*3/MM3 (ref 1–3)
LYMPHOCYTES NFR BLD AUTO: 21.5 % (ref 21–51)
MCH RBC QN AUTO: 25.5 PG (ref 27–33)
MCHC RBC AUTO-ENTMCNC: 31.4 G/DL (ref 33–37)
MCV RBC AUTO: 81 FL (ref 80–94)
METHADONE UR QL SCN: NEGATIVE
MONOCYTES # BLD AUTO: 0.89 10*3/MM3 (ref 0.1–0.9)
MONOCYTES NFR BLD AUTO: 8.2 % (ref 0–10)
NEUTROPHILS # BLD AUTO: 7.43 10*3/MM3 (ref 1.4–6.5)
NEUTROPHILS NFR BLD AUTO: 68 % (ref 30–70)
NITRITE UR QL STRIP: NEGATIVE
OPIATES UR QL: NEGATIVE
OSMOLALITY SERPL CALC.SUM OF ELEC: 278.7 MOSM/KG (ref 273–305)
OXYCODONE UR QL SCN: NEGATIVE
PCP UR QL SCN: NEGATIVE
PH UR STRIP.AUTO: 8.5 [PH] (ref 5–8)
PLATELET # BLD AUTO: 382 10*3/MM3 (ref 130–400)
PMV BLD AUTO: 12.7 FL (ref 6–10)
POTASSIUM BLD-SCNC: 3.3 MMOL/L (ref 3.5–5.3)
PROT SERPL-MCNC: 7.6 G/DL (ref 6–8)
PROT UR QL STRIP: NEGATIVE
RBC # BLD AUTO: 4.36 10*6/MM3 (ref 4.2–5.4)
SODIUM BLD-SCNC: 141 MMOL/L (ref 135–153)
SP GR UR STRIP: 1.01 (ref 1–1.03)
TROPONIN I SERPL-MCNC: <0.006 NG/ML
UROBILINOGEN UR QL STRIP: ABNORMAL
WBC NRBC COR # BLD: 10.91 10*3/MM3 (ref 4.5–12.5)

## 2019-01-01 PROCEDURE — 70450 CT HEAD/BRAIN W/O DYE: CPT | Performed by: RADIOLOGY

## 2019-01-01 PROCEDURE — 71045 X-RAY EXAM CHEST 1 VIEW: CPT | Performed by: RADIOLOGY

## 2019-01-01 PROCEDURE — 96375 TX/PRO/DX INJ NEW DRUG ADDON: CPT

## 2019-01-01 PROCEDURE — 93010 ELECTROCARDIOGRAM REPORT: CPT | Performed by: INTERNAL MEDICINE

## 2019-01-01 PROCEDURE — 96361 HYDRATE IV INFUSION ADD-ON: CPT

## 2019-01-01 PROCEDURE — 70450 CT HEAD/BRAIN W/O DYE: CPT

## 2019-01-01 PROCEDURE — 80307 DRUG TEST PRSMV CHEM ANLYZR: CPT | Performed by: PHYSICIAN ASSISTANT

## 2019-01-01 PROCEDURE — 81003 URINALYSIS AUTO W/O SCOPE: CPT | Performed by: PHYSICIAN ASSISTANT

## 2019-01-01 PROCEDURE — 96367 TX/PROPH/DG ADDL SEQ IV INF: CPT

## 2019-01-01 PROCEDURE — 82150 ASSAY OF AMYLASE: CPT | Performed by: PHYSICIAN ASSISTANT

## 2019-01-01 PROCEDURE — 96365 THER/PROPH/DIAG IV INF INIT: CPT

## 2019-01-01 PROCEDURE — 85025 COMPLETE CBC W/AUTO DIFF WBC: CPT | Performed by: PHYSICIAN ASSISTANT

## 2019-01-01 PROCEDURE — 25010000002 ONDANSETRON PER 1 MG: Performed by: PHYSICIAN ASSISTANT

## 2019-01-01 PROCEDURE — 25010000002 CEFTRIAXONE: Performed by: PHYSICIAN ASSISTANT

## 2019-01-01 PROCEDURE — 83690 ASSAY OF LIPASE: CPT | Performed by: PHYSICIAN ASSISTANT

## 2019-01-01 PROCEDURE — 93005 ELECTROCARDIOGRAM TRACING: CPT | Performed by: PHYSICIAN ASSISTANT

## 2019-01-01 PROCEDURE — 80053 COMPREHEN METABOLIC PANEL: CPT | Performed by: PHYSICIAN ASSISTANT

## 2019-01-01 PROCEDURE — 81025 URINE PREGNANCY TEST: CPT | Performed by: PHYSICIAN ASSISTANT

## 2019-01-01 PROCEDURE — 71045 X-RAY EXAM CHEST 1 VIEW: CPT

## 2019-01-01 PROCEDURE — 99284 EMERGENCY DEPT VISIT MOD MDM: CPT

## 2019-01-01 PROCEDURE — 84484 ASSAY OF TROPONIN QUANT: CPT | Performed by: PHYSICIAN ASSISTANT

## 2019-01-01 RX ORDER — AMOXICILLIN AND CLAVULANATE POTASSIUM 875; 125 MG/1; MG/1
1 TABLET, FILM COATED ORAL 2 TIMES DAILY
Qty: 14 TABLET | Refills: 0 | Status: SHIPPED | OUTPATIENT
Start: 2019-01-01 | End: 2019-01-08

## 2019-01-01 RX ORDER — HYDROXYZINE HYDROCHLORIDE 25 MG/1
25 TABLET, FILM COATED ORAL ONCE
Status: COMPLETED | OUTPATIENT
Start: 2019-01-01 | End: 2019-01-01

## 2019-01-01 RX ORDER — ONDANSETRON 2 MG/ML
4 INJECTION INTRAMUSCULAR; INTRAVENOUS ONCE
Status: COMPLETED | OUTPATIENT
Start: 2019-01-01 | End: 2019-01-01

## 2019-01-01 RX ORDER — HYDROXYZINE HYDROCHLORIDE 10 MG/1
10 TABLET, FILM COATED ORAL 3 TIMES DAILY PRN
Qty: 21 TABLET | Refills: 0 | Status: SHIPPED | OUTPATIENT
Start: 2019-01-01 | End: 2019-01-08

## 2019-01-01 RX ORDER — SODIUM CHLORIDE 0.9 % (FLUSH) 0.9 %
10 SYRINGE (ML) INJECTION AS NEEDED
Status: DISCONTINUED | OUTPATIENT
Start: 2019-01-01 | End: 2019-01-01 | Stop reason: HOSPADM

## 2019-01-01 RX ADMIN — CEFTRIAXONE 1 G: 1 INJECTION, POWDER, FOR SOLUTION INTRAMUSCULAR; INTRAVENOUS at 15:49

## 2019-01-01 RX ADMIN — HYDROXYZINE HYDROCHLORIDE 25 MG: 25 TABLET, FILM COATED ORAL at 15:02

## 2019-01-01 RX ADMIN — DOXYCYCLINE 100 MG: 100 INJECTION, POWDER, LYOPHILIZED, FOR SOLUTION INTRAVENOUS at 16:16

## 2019-01-01 RX ADMIN — SODIUM CHLORIDE 1000 ML: 9 INJECTION, SOLUTION INTRAVENOUS at 14:47

## 2019-01-01 RX ADMIN — ONDANSETRON 4 MG: 2 INJECTION, SOLUTION INTRAMUSCULAR; INTRAVENOUS at 14:47

## 2019-01-01 NOTE — DISCHARGE INSTRUCTIONS
Please complete your antibiotics and use hydroxyzine for anxiety as needed. Please follow up with your PCP in 2 days or return to ER if symptoms worsen.

## 2019-01-01 NOTE — ED PROVIDER NOTES
Subjective   This is a 43 year old female patient who presents to the ER with multiple complaints. First she has had some left ear pain and fullness with associated dizziness over the past 2 days. She denies headache, visual changes, weakness, numbness and tingling. She also complains of epigastric abdominal pain that is aching and 5/10 in quality. She has associated nausea without  Vomiting as well as dysuria. She denies diarrhea, constipation, chest pain and SOB. She is also battling some anxiety since the  took her kids away. She takes nothing to relief this. She denies SI and HI.             Review of Systems   Constitutional: Negative.  Negative for fever.   HENT: Negative.    Respiratory: Negative.    Cardiovascular: Negative.  Negative for chest pain.   Gastrointestinal: Positive for abdominal pain and nausea. Negative for abdominal distention, anal bleeding, blood in stool, constipation, diarrhea, rectal pain and vomiting.   Endocrine: Negative.    Genitourinary: Positive for dysuria. Negative for decreased urine volume, difficulty urinating, dyspareunia, enuresis, flank pain, frequency, genital sores, hematuria, menstrual problem, pelvic pain, urgency, vaginal bleeding and vaginal discharge.   Skin: Negative.    Neurological: Positive for dizziness. Negative for tremors, seizures, syncope, facial asymmetry, speech difficulty, weakness, light-headedness, numbness and headaches.   Psychiatric/Behavioral: Negative for agitation, behavioral problems, confusion, decreased concentration, dysphoric mood, hallucinations, self-injury, sleep disturbance and suicidal ideas. The patient is nervous/anxious. The patient is not hyperactive.    All other systems reviewed and are negative.      Past Medical History:   Diagnosis Date   • Asthma    • Diabetes mellitus (CMS/HCC)     GDM last pregnancy   • History of atrial fibrillation    • Substance abuse (CMS/HCC)        No Known Allergies    Past Surgical  "History:   Procedure Laterality Date   •  SECTION      x 3   • HIP FRACTURE SURGERY  2017       History reviewed. No pertinent family history.    Social History     Socioeconomic History   • Marital status:      Spouse name: Not on file   • Number of children: Not on file   • Years of education: Not on file   • Highest education level: Not on file   Tobacco Use   • Smoking status: Never Smoker   • Smokeless tobacco: Never Used   Substance and Sexual Activity   • Alcohol use: No   • Drug use: Yes     Types: Hydrocodone, Codeine     Comment: \"stopped around 6 weeks pregnant\"   • Sexual activity: Defer           Objective   Physical Exam   Constitutional: She is oriented to person, place, and time. She appears well-developed and well-nourished. No distress.   HENT:   Head: Normocephalic and atraumatic.   Right Ear: Hearing, tympanic membrane, external ear and ear canal normal.   Left Ear: Hearing, external ear and ear canal normal. Tympanic membrane is injected and bulging.   Nose: Nose normal.   Mouth/Throat: Oropharynx is clear and moist. No oropharyngeal exudate.   Eyes: Conjunctivae and EOM are normal. Pupils are equal, round, and reactive to light.   Neck: Normal range of motion. Neck supple. No JVD present. No tracheal deviation present.   Cardiovascular: Normal rate, regular rhythm and normal heart sounds. Exam reveals no gallop and no friction rub.   No murmur heard.  Pulmonary/Chest: Effort normal and breath sounds normal. No stridor. No respiratory distress. She has no wheezes. She has no rales. She exhibits no tenderness.   Abdominal: Soft. Bowel sounds are normal. She exhibits no distension and no mass. There is no tenderness. There is no rebound and no guarding. No hernia.   Musculoskeletal: Normal range of motion. She exhibits no edema or deformity.   Neurological: She is alert and oriented to person, place, and time. She displays normal reflexes. No cranial nerve deficit or sensory " deficit. She exhibits normal muscle tone. Coordination normal.   Skin: Skin is warm and dry. No rash noted. She is not diaphoretic. No erythema. No pallor.   Psychiatric: She has a normal mood and affect. Her behavior is normal. Thought content normal.   Nursing note and vitals reviewed.      Procedures           ED Course  ED Course as of Jan 01 1725   Tue Jan 01, 2019   1541 Minimal Left basilar infiltrate; no other acute findings per dr. Iglesias.  XR Chest 1 View [MM]   1552 Sinus bradycardia with 1st degree AV block per Dr. Iglesias.  ECG 12 Lead [MM]   1616 No acute intracranial abnormality per [facility] report.  CT Head Without Contrast [MM]   1643 Patient diagnosed with left basilar pneumonia, anxiety, hypokalemia. Will be d/c home with rx for augmentin and f/u with PCP in 2 days or return to ER if symptoms worsen.   [MM]      ED Course User Index  [MM] Glenna Gonzalez PA                  MDM  Number of Diagnoses or Management Options  Anxiety:   Hypokalemia:   Pneumonia of left lower lobe due to infectious organism (CMS/HCC):      Amount and/or Complexity of Data Reviewed  Clinical lab tests: ordered and reviewed  Tests in the radiology section of CPT®: ordered and reviewed  Discuss the patient with other providers: yes          Final diagnoses:   Pneumonia of left lower lobe due to infectious organism (CMS/HCC)   Hypokalemia   Anxiety            Glenna Gonzalez PA  01/01/19 4944

## 2020-12-02 ENCOUNTER — HOSPITAL ENCOUNTER (INPATIENT)
Facility: HOSPITAL | Age: 45
LOS: 2 days | Discharge: LEFT BEFORE DISCHARGE | End: 2020-12-04
Attending: EMERGENCY MEDICINE | Admitting: INTERNAL MEDICINE

## 2020-12-02 ENCOUNTER — APPOINTMENT (OUTPATIENT)
Dept: GENERAL RADIOLOGY | Facility: HOSPITAL | Age: 45
End: 2020-12-02

## 2020-12-02 DIAGNOSIS — J44.9 COPD WITH ASTHMA (HCC): ICD-10-CM

## 2020-12-02 DIAGNOSIS — R06.00 DYSPNEA, UNSPECIFIED TYPE: Primary | ICD-10-CM

## 2020-12-02 PROBLEM — J96.01 ACUTE RESPIRATORY FAILURE WITH HYPOXIA (HCC): Status: ACTIVE | Noted: 2020-12-02

## 2020-12-02 LAB
A-A DO2: 33.1 MMHG (ref 0–300)
ALBUMIN SERPL-MCNC: 4.07 G/DL (ref 3.5–5.2)
ALBUMIN/GLOB SERPL: 1 G/DL
ALP SERPL-CCNC: 109 U/L (ref 39–117)
ALT SERPL W P-5'-P-CCNC: 13 U/L (ref 1–33)
ANION GAP SERPL CALCULATED.3IONS-SCNC: 9.8 MMOL/L (ref 5–15)
ARTERIAL PATENCY WRIST A: ABNORMAL
AST SERPL-CCNC: 24 U/L (ref 1–32)
ATMOSPHERIC PRESS: 735 MMHG
BASE EXCESS BLDA CALC-SCNC: 1 MMOL/L (ref 0–2)
BASOPHILS # BLD AUTO: 0.14 10*3/MM3 (ref 0–0.2)
BASOPHILS NFR BLD AUTO: 0.8 % (ref 0–1.5)
BDY SITE: ABNORMAL
BILIRUB SERPL-MCNC: 0.5 MG/DL (ref 0–1.2)
BODY TEMPERATURE: 0 C
BUN SERPL-MCNC: 9 MG/DL (ref 6–20)
BUN/CREAT SERPL: 12.2 (ref 7–25)
CALCIUM SPEC-SCNC: 9.1 MG/DL (ref 8.6–10.5)
CHLORIDE SERPL-SCNC: 105 MMOL/L (ref 98–107)
CO2 BLDA-SCNC: 28.4 MMOL/L (ref 22–33)
CO2 SERPL-SCNC: 29.2 MMOL/L (ref 22–29)
COHGB MFR BLD: 1.5 % (ref 0–5)
CREAT SERPL-MCNC: 0.74 MG/DL (ref 0.57–1)
D-LACTATE SERPL-SCNC: 1.2 MMOL/L (ref 0.5–2)
DEPRECATED RDW RBC AUTO: 54.7 FL (ref 37–54)
EOSINOPHIL # BLD AUTO: 1.48 10*3/MM3 (ref 0–0.4)
EOSINOPHIL NFR BLD AUTO: 8.3 % (ref 0.3–6.2)
ERYTHROCYTE [DISTWIDTH] IN BLOOD BY AUTOMATED COUNT: 18.1 % (ref 12.3–15.4)
FLUAV RNA RESP QL NAA+PROBE: NOT DETECTED
FLUBV RNA RESP QL NAA+PROBE: NOT DETECTED
GFR SERPL CREATININE-BSD FRML MDRD: 85 ML/MIN/1.73
GLOBULIN UR ELPH-MCNC: 4.2 GM/DL
GLUCOSE SERPL-MCNC: 104 MG/DL (ref 65–99)
HCO3 BLDA-SCNC: 26.9 MMOL/L (ref 20–26)
HCT VFR BLD AUTO: 41.7 % (ref 34–46.6)
HCT VFR BLD CALC: 34.8 % (ref 38–51)
HGB BLD-MCNC: 12.6 G/DL (ref 12–15.9)
HGB BLDA-MCNC: 11.4 G/DL (ref 13.5–17.5)
IMM GRANULOCYTES # BLD AUTO: 0.07 10*3/MM3 (ref 0–0.05)
IMM GRANULOCYTES NFR BLD AUTO: 0.4 % (ref 0–0.5)
INHALED O2 CONCENTRATION: 21 %
LYMPHOCYTES # BLD AUTO: 2.43 10*3/MM3 (ref 0.7–3.1)
LYMPHOCYTES NFR BLD AUTO: 13.7 % (ref 19.6–45.3)
Lab: ABNORMAL
MCH RBC QN AUTO: 25.4 PG (ref 26.6–33)
MCHC RBC AUTO-ENTMCNC: 30.2 G/DL (ref 31.5–35.7)
MCV RBC AUTO: 84.1 FL (ref 79–97)
METHGB BLD QL: 0.2 % (ref 0–3)
MODALITY: ABNORMAL
MONOCYTES # BLD AUTO: 1.65 10*3/MM3 (ref 0.1–0.9)
MONOCYTES NFR BLD AUTO: 9.3 % (ref 5–12)
NEUTROPHILS NFR BLD AUTO: 11.99 10*3/MM3 (ref 1.7–7)
NEUTROPHILS NFR BLD AUTO: 67.5 % (ref 42.7–76)
NOTE: ABNORMAL
NRBC BLD AUTO-RTO: 0 /100 WBC (ref 0–0.2)
OXYHGB MFR BLDV: 88.6 % (ref 94–99)
PCO2 BLDA: 47.2 MM HG (ref 35–45)
PCO2 TEMP ADJ BLD: ABNORMAL MM[HG]
PH BLDA: 7.36 PH UNITS (ref 7.35–7.45)
PH, TEMP CORRECTED: ABNORMAL
PLATELET # BLD AUTO: 409 10*3/MM3 (ref 140–450)
PMV BLD AUTO: 11.5 FL (ref 6–12)
PO2 BLDA: 58 MM HG (ref 83–108)
PO2 TEMP ADJ BLD: ABNORMAL MM[HG]
POTASSIUM SERPL-SCNC: 4.6 MMOL/L (ref 3.5–5.2)
PROT SERPL-MCNC: 8.3 G/DL (ref 6–8.5)
RBC # BLD AUTO: 4.96 10*6/MM3 (ref 3.77–5.28)
SAO2 % BLDCOA: 90.1 % (ref 94–99)
SARS-COV-2 RNA RESP QL NAA+PROBE: NOT DETECTED
SODIUM SERPL-SCNC: 144 MMOL/L (ref 136–145)
VENTILATOR MODE: ABNORMAL
WBC # BLD AUTO: 17.76 10*3/MM3 (ref 3.4–10.8)

## 2020-12-02 PROCEDURE — 71045 X-RAY EXAM CHEST 1 VIEW: CPT | Performed by: RADIOLOGY

## 2020-12-02 PROCEDURE — 83605 ASSAY OF LACTIC ACID: CPT | Performed by: PHYSICIAN ASSISTANT

## 2020-12-02 PROCEDURE — 99223 1ST HOSP IP/OBS HIGH 75: CPT | Performed by: INTERNAL MEDICINE

## 2020-12-02 PROCEDURE — 94799 UNLISTED PULMONARY SVC/PX: CPT

## 2020-12-02 PROCEDURE — 94640 AIRWAY INHALATION TREATMENT: CPT

## 2020-12-02 PROCEDURE — 36600 WITHDRAWAL OF ARTERIAL BLOOD: CPT

## 2020-12-02 PROCEDURE — 80053 COMPREHEN METABOLIC PANEL: CPT | Performed by: PHYSICIAN ASSISTANT

## 2020-12-02 PROCEDURE — 93005 ELECTROCARDIOGRAM TRACING: CPT | Performed by: EMERGENCY MEDICINE

## 2020-12-02 PROCEDURE — 93010 ELECTROCARDIOGRAM REPORT: CPT | Performed by: INTERNAL MEDICINE

## 2020-12-02 PROCEDURE — 85025 COMPLETE CBC W/AUTO DIFF WBC: CPT | Performed by: PHYSICIAN ASSISTANT

## 2020-12-02 PROCEDURE — 99284 EMERGENCY DEPT VISIT MOD MDM: CPT

## 2020-12-02 PROCEDURE — 87040 BLOOD CULTURE FOR BACTERIA: CPT | Performed by: PHYSICIAN ASSISTANT

## 2020-12-02 PROCEDURE — 87636 SARSCOV2 & INF A&B AMP PRB: CPT | Performed by: PHYSICIAN ASSISTANT

## 2020-12-02 PROCEDURE — 71045 X-RAY EXAM CHEST 1 VIEW: CPT

## 2020-12-02 PROCEDURE — 82805 BLOOD GASES W/O2 SATURATION: CPT

## 2020-12-02 PROCEDURE — 25010000002 CEFTRIAXONE PER 250 MG: Performed by: PHYSICIAN ASSISTANT

## 2020-12-02 PROCEDURE — 82375 ASSAY CARBOXYHB QUANT: CPT

## 2020-12-02 PROCEDURE — 25010000002 METHYLPREDNISOLONE PER 125 MG: Performed by: PHYSICIAN ASSISTANT

## 2020-12-02 PROCEDURE — 83050 HGB METHEMOGLOBIN QUAN: CPT

## 2020-12-02 RX ORDER — ALBUTEROL SULFATE 2.5 MG/3ML
2.5 SOLUTION RESPIRATORY (INHALATION) EVERY 4 HOURS PRN
Status: CANCELLED | OUTPATIENT
Start: 2020-12-02

## 2020-12-02 RX ORDER — IPRATROPIUM BROMIDE AND ALBUTEROL SULFATE 2.5; .5 MG/3ML; MG/3ML
3 SOLUTION RESPIRATORY (INHALATION) ONCE
Status: COMPLETED | OUTPATIENT
Start: 2020-12-02 | End: 2020-12-02

## 2020-12-02 RX ORDER — SODIUM CHLORIDE 0.9 % (FLUSH) 0.9 %
10 SYRINGE (ML) INJECTION EVERY 12 HOURS SCHEDULED
Status: DISCONTINUED | OUTPATIENT
Start: 2020-12-03 | End: 2020-12-04 | Stop reason: HOSPADM

## 2020-12-02 RX ORDER — METHYLPREDNISOLONE SODIUM SUCCINATE 40 MG/ML
40 INJECTION, POWDER, LYOPHILIZED, FOR SOLUTION INTRAMUSCULAR; INTRAVENOUS EVERY 12 HOURS
Status: DISCONTINUED | OUTPATIENT
Start: 2020-12-03 | End: 2020-12-04 | Stop reason: HOSPADM

## 2020-12-02 RX ORDER — SODIUM CHLORIDE 0.9 % (FLUSH) 0.9 %
10 SYRINGE (ML) INJECTION AS NEEDED
Status: DISCONTINUED | OUTPATIENT
Start: 2020-12-02 | End: 2020-12-04 | Stop reason: HOSPADM

## 2020-12-02 RX ORDER — IPRATROPIUM BROMIDE AND ALBUTEROL SULFATE 2.5; .5 MG/3ML; MG/3ML
3 SOLUTION RESPIRATORY (INHALATION)
Status: DISCONTINUED | OUTPATIENT
Start: 2020-12-03 | End: 2020-12-03

## 2020-12-02 RX ORDER — MAGNESIUM SULFATE HEPTAHYDRATE 40 MG/ML
2 INJECTION, SOLUTION INTRAVENOUS ONCE
Status: COMPLETED | OUTPATIENT
Start: 2020-12-03 | End: 2020-12-03

## 2020-12-02 RX ORDER — NITROGLYCERIN 0.4 MG/1
0.4 TABLET SUBLINGUAL
Status: DISCONTINUED | OUTPATIENT
Start: 2020-12-02 | End: 2020-12-04 | Stop reason: HOSPADM

## 2020-12-02 RX ORDER — HEPARIN SODIUM 5000 [USP'U]/ML
5000 INJECTION, SOLUTION INTRAVENOUS; SUBCUTANEOUS EVERY 8 HOURS SCHEDULED
Status: DISCONTINUED | OUTPATIENT
Start: 2020-12-03 | End: 2020-12-04 | Stop reason: HOSPADM

## 2020-12-02 RX ORDER — ALBUTEROL SULFATE 2.5 MG/3ML
2.5 SOLUTION RESPIRATORY (INHALATION) EVERY 4 HOURS PRN
COMMUNITY

## 2020-12-02 RX ORDER — METHYLPREDNISOLONE SODIUM SUCCINATE 125 MG/2ML
125 INJECTION, POWDER, LYOPHILIZED, FOR SOLUTION INTRAMUSCULAR; INTRAVENOUS ONCE
Status: COMPLETED | OUTPATIENT
Start: 2020-12-02 | End: 2020-12-02

## 2020-12-02 RX ADMIN — METHYLPREDNISOLONE SODIUM SUCCINATE 125 MG: 125 INJECTION, POWDER, FOR SOLUTION INTRAMUSCULAR; INTRAVENOUS at 17:40

## 2020-12-02 RX ADMIN — IPRATROPIUM BROMIDE AND ALBUTEROL SULFATE 3 ML: .5; 3 SOLUTION RESPIRATORY (INHALATION) at 14:35

## 2020-12-02 RX ADMIN — SODIUM CHLORIDE 1848 ML: 9 INJECTION, SOLUTION INTRAVENOUS at 15:08

## 2020-12-02 RX ADMIN — DOXYCYCLINE 100 MG: 100 INJECTION, POWDER, LYOPHILIZED, FOR SOLUTION INTRAVENOUS at 18:49

## 2020-12-02 RX ADMIN — CEFTRIAXONE 1 G: 1 INJECTION, POWDER, FOR SOLUTION INTRAMUSCULAR; INTRAVENOUS at 15:36

## 2020-12-02 NOTE — H&P
[facility]    Patient Identification:  Name:  Tiera Mendoza  Age:  45 y.o.  Sex:  female  :  1975  MRN:  [id number]   Visit Number:  [id number]  Admit Date: 2020   Room number:  404/04  Primary Care Physician:  Alan Martin APRN     Subjective     Chief complaint:    Chief Complaint   Patient presents with   • Cough   • Shortness of Breath       History of presenting illness:  45 y.o. female with hx gestational DM, distant hx of substance abuse, asthma, afib who presents with 5 days of worsening shortness of breath. Patient reports the last 5 days she has had worsening cough and shortness of breath. She notes some sputum production that is mostly clear but blood tinged at times. Patient notes she has never had an asthma exacerbation before that required hospitalization. Patient notes she feels better since getting treatment in the ER. She notes she is still a little dyspneic. Patient saturating 100% on 2L NC during the exam. Patient was given Ceftriaxone, doxycyline, methyprednisolone and single duoneb treatment in the emergency room.     Patient notes she had DM during her third pregnancy but it resolved after birth. She noted hx of polysubstance drug use but has not used any illicit substances since 2017.   ---------------------------------------------------------------------------------------------------------------------   Review of Systems   Constitutional: Negative.    HENT: Positive for congestion.    Eyes: Negative.    Respiratory: Positive for cough and shortness of breath.    Cardiovascular: Negative.    Gastrointestinal: Negative.    Endocrine: Negative.    Genitourinary: Negative.    Musculoskeletal: Negative.    Skin: Negative.    Allergic/Immunologic: Negative.    Neurological: Negative.    Hematological: Negative.    Psychiatric/Behavioral: Negative.   "    ---------------------------------------------------------------------------------------------------------------------   Past Medical History:   Diagnosis Date   • Asthma    • Diabetes mellitus (CMS/[facility])     GDM last pregnancy   • History of atrial fibrillation    • Substance abuse (CMS/[facility])      Past Surgical History:   Procedure Laterality Date   •  SECTION      x 3   • HIP FRACTURE SURGERY  2017     No family history on file.  Social History     Socioeconomic History   • Marital status:      Spouse name: Not on file   • Number of children: Not on file   • Years of education: Not on file   • Highest education level: Not on file   Tobacco Use   • Smoking status: Never Smoker   • Smokeless tobacco: Never Used   Substance and Sexual Activity   • Alcohol use: No   • Drug use: Yes     Types: Hydrocodone, Codeine     Comment: \"stopped around 6 weeks pregnant\"   • Sexual activity: Defer     ---------------------------------------------------------------------------------------------------------------------   Allergies:  Patient has no known allergies.  ---------------------------------------------------------------------------------------------------------------------   Medications below are reported home medications pulling from within the system; at this time, these medications have not been reconciled unless otherwise specified and are in the verification process for further verifcation as current home medications.    Prior to Admission Medications     Prescriptions Last Dose Informant Patient Reported? Taking?    albuterol (PROVENTIL HFA;VENTOLIN HFA) 108 (90 BASE) MCG/ACT inhaler  Self Yes No    Inhale 2 puffs Every 4 (Four) Hours As Needed for Wheezing.    montelukast (SINGULAIR) 10 MG tablet  Self Yes No    Take 10 mg by mouth Every Night.    Prenatal Vit-Fe Fumarate-FA (PRENATAL 27-1) 27-1 MG tablet tablet  Self Yes No    Take  by mouth Daily.        Objective     Vital Signs:  Temp:  " [98.3 °F (36.8 °C)] 98.3 °F (36.8 °C)  Heart Rate:  [100-130] 103  Resp:  [20-24] 22  BP: (111-123)/(57-80) 117/80    No data found.  SpO2:  [86 %-100 %] 100 %  on  Flow (L/min):  [2] 2;   Device (Oxygen Therapy): nasal cannula  Body mass index is 40.72 kg/m².    Wt Readings from Last 3 Encounters:   12/02/20 118 kg (260 lb)   01/01/19 118 kg (260 lb)   10/23/18 131 kg (288 lb)      ---------------------------------------------------------------------------------------------------------------------   Physical Exam:  Constitutional:  Well-developed and well-nourished.       HENT:  Head: Normocephalic and atraumatic.  Mouth:  Moist mucous membranes.    Eyes:  Conjunctivae and EOM are normal.  Pupils are equal, round, and reactive to light.  No scleral icterus.  Cardiovascular:  Normal rate, regular rhythm and normal heart sounds with no murmur.  Pulmonary/Chest: Dyspneic when talking in long sentences, expiratory wheezing noted bilaterally   Abdominal:  Soft.  Bowel sounds are normal.  No distension and no tenderness.   Musculoskeletal:  No edema, no tenderness, and no deformity.  No red or swollen joints anywhere.    Neurological:  Alert and oriented to person, place, and time.  No cranial nerve deficit.  No focal neurological deficit.   Skin:  Skin is warm and dry.  No rash noted.  No pallor.   Peripheral vascular:  No edema and strong pulses on all 4 extremities.    ---------------------------------------------------------------------------------------------------------------------  EKG:  Personally reviewed. Sinus tachycardia. No STEMI.   ECG 12 Lead             Telemetry:  Sinus rhythm around rate of 110.     I have personally looked at both the EKG and the telemetry strips.    Last echocardiogram:     --------------------------------------------------------------------------------------------------------------------  Labs:  Results from last 7 days   Lab Units 12/02/20  1450 12/02/20  1317   LACTATE mmol/L 1.2   --    WBC 10*3/mm3  --  17.76*   HEMOGLOBIN g/dL  --  12.6   HEMATOCRIT %  --  41.7   MCV fL  --  84.1   MCHC g/dL  --  30.2*   PLATELETS 10*3/mm3  --  409     Results from last 7 days   Lab Units 12/02/20  1707   PH, ARTERIAL pH units 7.364   PO2 ART mm Hg 58.0*   PCO2, ARTERIAL mm Hg 47.2*   HCO3 ART mmol/L 26.9*     Results from last 7 days   Lab Units 12/02/20  1317   SODIUM mmol/L 144   POTASSIUM mmol/L 4.6   CHLORIDE mmol/L 105   CO2 mmol/L 29.2*   BUN mg/dL 9   CREATININE mg/dL 0.74   EGFR IF NONAFRICN AM mL/min/1.73 85   CALCIUM mg/dL 9.1   GLUCOSE mg/dL 104*   ALBUMIN g/dL 4.07   BILIRUBIN mg/dL 0.5   ALK PHOS U/L 109   AST (SGOT) U/L 24   ALT (SGPT) U/L 13   Estimated Creatinine Clearance: 129 mL/min (by C-G formula based on SCr of 0.74 mg/dL).    No results found for: AMMONIA          No results found for: HGBA1C, POCGLU  Lab Results   Component Value Date    TSH 2.837 07/08/2018    FREET4 1.00 07/08/2018     Lab Results   Component Value Date    PREGTESTUR Negative 01/01/2019     Pain Management Panel     Pain Management Panel Latest Ref Rng & Units 1/1/2019 7/8/2018    AMPHETAMINES SCREEN, URINE Negative Negative Negative    BARBITURATES SCREEN Negative Negative Negative    BENZODIAZEPINE SCREEN, URINE Negative Negative Negative    BUPRENORPHINEUR Negative Negative Negative    COCAINE SCREEN, URINE Negative Negative Negative    METHADONE SCREEN, URINE Negative Negative Negative        Brief Urine Lab Results     None        No results found for: BLOODCX  No results found for: URINECX  No results found for: WOUNDCX  No results found for: STOOLCX    I have personally looked at the labs and they are summarized above.  ----------------------------------------------------------------------------------------------------------------------  Detailed radiology reports for the last 24 hours:    Imaging Results (Last 24 Hours)     Procedure Component Value Units Date/Time    XR Chest 1 View [[id number]] Collected:  12/02/20 1423     Updated: 12/02/20 1429    Narrative:      EXAM:    XR Chest, 1 View     EXAM DATE:    12/2/2020 1:24 PM     CLINICAL HISTORY:    soa     TECHNIQUE:    Frontal view of the chest.     COMPARISON:    01/01/2019     FINDINGS:    LUNGS:  Patchy bibasilar airspace disease.    PLEURAL SPACE:  Unremarkable.  No pneumothorax.    HEART:  Unremarkable.  No cardiomegaly.    MEDIASTINUM:  Unremarkable.    BONES/JOINTS:  Unremarkable.       Impression:        Patchy bibasilar airspace disease.     This report was finalized on 12/2/2020 2:23 PM by Dr. Kalpesh Benavides MD.           Final impressions for the last 30 days of radiology reports:    Xr Chest 1 View    Result Date: 12/2/2020    Patchy bibasilar airspace disease.  This report was finalized on 12/2/2020 2:23 PM by Dr. Kalpesh Benavides MD.      I have personally looked at the radiology images and read the final radiology report.    Assessment & Plan      #Acute hypoxic respiratory failure 2/2 bibasilar pneumonia and asthma exacerbation   - Patient presents with tachycardiac, tachypnea, leukocytosis and PaO2 of 58 on room air   - Significant wheezing on exam consistent with asthma exacerbation   - Suspect asthma exacerbated by pneumonia.   - COVID-19 negative on admission   - Will start on ceftriaxone and doxycyline   - Will start scheduled duonebs, BID IV methylprednisolone, and give dose of IV magnesium   - Symptoms currently improving and patient saturating well on room air   - Will consider BiPAP if respiratory symptoms worsen. Will also consider CT scan of chest if no improvement.     #Hx of illicit drug use   - No active drug use per patient. Will get UDS.     #Hx of afib   - Patient currently with sinus tachycardia   - Will await med/rec     #Hx of gestational DM  - Will monitor for hyperglycemia     Will also get urine pregnancy test     F: PO  E: Replace as needed   N: CC    Code status: Full     Dispo: Admit to tele.        VTE Prophylaxis:    Mechanical Order History:     None      Pharmalogical Order History:      Ordered     Dose Route Frequency Stop    Signed and Held  heparin (porcine) 5000 UNIT/ML injection 5,000 Units      5,000 Units SC Every 8 Hours Scheduled --              David Rodrigez MD  [facility]  12/02/20  18:54 EST

## 2020-12-02 NOTE — ED PROVIDER NOTES
"Subjective   Pt has had asthma since child.   Inc cough and soa that has gotten worse over the last several days   Had steroid shot several days ago in [facility] [place]      History provided by:  Patient  Shortness of Breath  Severity:  Mild  Onset quality:  Sudden  Timing:  Constant  Progression:  Worsening  Chronicity:  New  Context: activity    Relieved by:  Nothing  Worsened by:  Deep breathing  Associated symptoms: cough and wheezing    Associated symptoms: no chest pain, no ear pain, no fever, no headaches, no rash, no sore throat and no vomiting        Review of Systems   Constitutional: Negative for chills and fever.   HENT: Negative for congestion, ear pain and sore throat.    Respiratory: Positive for cough, shortness of breath and wheezing.    Cardiovascular: Negative for chest pain.   Gastrointestinal: Negative for diarrhea, nausea and vomiting.   Genitourinary: Negative for dysuria and flank pain.   Skin: Negative for rash.   Neurological: Negative for headaches.   Psychiatric/Behavioral: The patient is not nervous/anxious.    All other systems reviewed and are negative.      Past Medical History:   Diagnosis Date   • Asthma    • Diabetes mellitus (CMS/[facility])     GDM last pregnancy   • History of atrial fibrillation    • Substance abuse (CMS/[facility])        No Known Allergies    Past Surgical History:   Procedure Laterality Date   •  SECTION      x 3   • HIP FRACTURE SURGERY  2017       No family history on file.    Social History     Socioeconomic History   • Marital status:      Spouse name: Not on file   • Number of children: Not on file   • Years of education: Not on file   • Highest education level: Not on file   Tobacco Use   • Smoking status: Never Smoker   • Smokeless tobacco: Current User     Types: Chew   Substance and Sexual Activity   • Alcohol use: No   • Drug use: Yes     Types: Hydrocodone, Codeine     Comment: \"stopped around 6 weeks pregnant\"   • Sexual activity: Defer "           Objective   Physical Exam  Vitals signs and nursing note reviewed.   Constitutional:       Appearance: She is well-developed.   HENT:      Head: Normocephalic.   Neck:      Musculoskeletal: Neck supple.   Cardiovascular:      Rate and Rhythm: Normal rate and regular rhythm.   Pulmonary:      Effort: Pulmonary effort is normal.      Breath sounds: Examination of the right-upper field reveals wheezing. Examination of the left-upper field reveals wheezing. Examination of the right-middle field reveals wheezing. Examination of the right-lower field reveals wheezing. Examination of the left-lower field reveals wheezing. Wheezing present.   Abdominal:      General: Bowel sounds are normal.      Palpations: Abdomen is soft.      Tenderness: There is no abdominal tenderness.   Musculoskeletal: Normal range of motion.   Skin:     General: Skin is warm and dry.   Neurological:      Mental Status: She is alert and oriented to person, place, and time.   Psychiatric:         Behavior: Behavior normal.         Thought Content: Thought content normal.         Judgment: Judgment normal.         Procedures           ED Course  ED Course as of Dec 02 2125   Wed Dec 02, 2020   1756 Paged hospitalist     []   1802 D/w dr brody will admit     []      ED Course User Index  [LC] Yumiko Mera PA                                           [facility]    Final diagnoses:   Dyspnea, unspecified type   COPD with asthma (CMS/HCC)            Yumiko Mera PA  12/02/20 2125

## 2020-12-02 NOTE — ED NOTES
Called to give report to [facility] and they stated they would call back .     Alba Juan, ELENI  12/02/20 [id number]

## 2020-12-02 NOTE — ED NOTES
Pt states she has had a difficulty time breathing for the last 4 days . She said she called her doctors and they told her to come to the   ED to get checked out.      Alba Juan RN  12/02/20 [id number]

## 2020-12-03 LAB
6-ACETYL MORPHINE: NEGATIVE
AMPHET+METHAMPHET UR QL: POSITIVE
ANION GAP SERPL CALCULATED.3IONS-SCNC: 6 MMOL/L (ref 5–15)
B-HCG UR QL: NEGATIVE
BARBITURATES UR QL SCN: NEGATIVE
BASOPHILS # BLD AUTO: 0.04 10*3/MM3 (ref 0–0.2)
BASOPHILS NFR BLD AUTO: 0.5 % (ref 0–1.5)
BENZODIAZ UR QL SCN: NEGATIVE
BUN SERPL-MCNC: 9 MG/DL (ref 6–20)
BUN/CREAT SERPL: 13.4 (ref 7–25)
BUPRENORPHINE SERPL-MCNC: NEGATIVE NG/ML
CALCIUM SPEC-SCNC: 8.9 MG/DL (ref 8.6–10.5)
CANNABINOIDS SERPL QL: NEGATIVE
CHLORIDE SERPL-SCNC: 105 MMOL/L (ref 98–107)
CO2 SERPL-SCNC: 23 MMOL/L (ref 22–29)
COCAINE UR QL: NEGATIVE
CREAT SERPL-MCNC: 0.67 MG/DL (ref 0.57–1)
DEPRECATED RDW RBC AUTO: 56.9 FL (ref 37–54)
EOSINOPHIL # BLD AUTO: 0.01 10*3/MM3 (ref 0–0.4)
EOSINOPHIL NFR BLD AUTO: 0.1 % (ref 0.3–6.2)
ERYTHROCYTE [DISTWIDTH] IN BLOOD BY AUTOMATED COUNT: 18.2 % (ref 12.3–15.4)
GFR SERPL CREATININE-BSD FRML MDRD: 96 ML/MIN/1.73
GLUCOSE BLDC GLUCOMTR-MCNC: 113 MG/DL (ref 70–130)
GLUCOSE BLDC GLUCOMTR-MCNC: 127 MG/DL (ref 70–130)
GLUCOSE BLDC GLUCOMTR-MCNC: 145 MG/DL (ref 70–130)
GLUCOSE SERPL-MCNC: 126 MG/DL (ref 65–99)
HBA1C MFR BLD: 5.7 % (ref 4.8–5.6)
HCT VFR BLD AUTO: 39.9 % (ref 34–46.6)
HGB BLD-MCNC: 12 G/DL (ref 12–15.9)
IMM GRANULOCYTES # BLD AUTO: 0.02 10*3/MM3 (ref 0–0.05)
IMM GRANULOCYTES NFR BLD AUTO: 0.2 % (ref 0–0.5)
LYMPHOCYTES # BLD AUTO: 0.87 10*3/MM3 (ref 0.7–3.1)
LYMPHOCYTES NFR BLD AUTO: 10.3 % (ref 19.6–45.3)
MCH RBC QN AUTO: 25.6 PG (ref 26.6–33)
MCHC RBC AUTO-ENTMCNC: 30.1 G/DL (ref 31.5–35.7)
MCV RBC AUTO: 85.1 FL (ref 79–97)
METHADONE UR QL SCN: NEGATIVE
MONOCYTES # BLD AUTO: 0.04 10*3/MM3 (ref 0.1–0.9)
MONOCYTES NFR BLD AUTO: 0.5 % (ref 5–12)
NEUTROPHILS NFR BLD AUTO: 7.44 10*3/MM3 (ref 1.7–7)
NEUTROPHILS NFR BLD AUTO: 88.4 % (ref 42.7–76)
NRBC BLD AUTO-RTO: 0 /100 WBC (ref 0–0.2)
OPIATES UR QL: NEGATIVE
OXYCODONE UR QL SCN: NEGATIVE
PCP UR QL SCN: NEGATIVE
PLATELET # BLD AUTO: 359 10*3/MM3 (ref 140–450)
PMV BLD AUTO: 11 FL (ref 6–12)
POTASSIUM SERPL-SCNC: 4.7 MMOL/L (ref 3.5–5.2)
QT INTERVAL: 324 MS
QTC INTERVAL: 440 MS
RBC # BLD AUTO: 4.69 10*6/MM3 (ref 3.77–5.28)
SODIUM SERPL-SCNC: 134 MMOL/L (ref 136–145)
WBC # BLD AUTO: 8.42 10*3/MM3 (ref 3.4–10.8)

## 2020-12-03 PROCEDURE — 94799 UNLISTED PULMONARY SVC/PX: CPT

## 2020-12-03 PROCEDURE — 80307 DRUG TEST PRSMV CHEM ANLYZR: CPT | Performed by: INTERNAL MEDICINE

## 2020-12-03 PROCEDURE — 25010000002 HEPARIN (PORCINE) PER 1000 UNITS: Performed by: INTERNAL MEDICINE

## 2020-12-03 PROCEDURE — 85025 COMPLETE CBC W/AUTO DIFF WBC: CPT | Performed by: INTERNAL MEDICINE

## 2020-12-03 PROCEDURE — 25010000002 METHYLPREDNISOLONE PER 40 MG: Performed by: INTERNAL MEDICINE

## 2020-12-03 PROCEDURE — 83036 HEMOGLOBIN GLYCOSYLATED A1C: CPT | Performed by: INTERNAL MEDICINE

## 2020-12-03 PROCEDURE — 80048 BASIC METABOLIC PNL TOTAL CA: CPT | Performed by: INTERNAL MEDICINE

## 2020-12-03 PROCEDURE — 81025 URINE PREGNANCY TEST: CPT | Performed by: INTERNAL MEDICINE

## 2020-12-03 PROCEDURE — 94640 AIRWAY INHALATION TREATMENT: CPT

## 2020-12-03 PROCEDURE — 99233 SBSQ HOSP IP/OBS HIGH 50: CPT | Performed by: INTERNAL MEDICINE

## 2020-12-03 PROCEDURE — 25010000002 CEFTRIAXONE PER 250 MG: Performed by: INTERNAL MEDICINE

## 2020-12-03 PROCEDURE — 25010000002 MAGNESIUM SULFATE 2 GM/50ML SOLUTION: Performed by: INTERNAL MEDICINE

## 2020-12-03 PROCEDURE — 82962 GLUCOSE BLOOD TEST: CPT

## 2020-12-03 RX ORDER — BENZONATATE 100 MG/1
100 CAPSULE ORAL 3 TIMES DAILY PRN
Status: DISCONTINUED | OUTPATIENT
Start: 2020-12-03 | End: 2020-12-04 | Stop reason: HOSPADM

## 2020-12-03 RX ORDER — MONTELUKAST SODIUM 10 MG/1
10 TABLET ORAL NIGHTLY
Status: DISCONTINUED | OUTPATIENT
Start: 2020-12-03 | End: 2020-12-04 | Stop reason: HOSPADM

## 2020-12-03 RX ORDER — ALBUTEROL SULFATE 2.5 MG/3ML
2.5 SOLUTION RESPIRATORY (INHALATION) EVERY 4 HOURS PRN
Status: CANCELLED | OUTPATIENT
Start: 2020-12-03

## 2020-12-03 RX ORDER — IPRATROPIUM BROMIDE AND ALBUTEROL SULFATE 2.5; .5 MG/3ML; MG/3ML
3 SOLUTION RESPIRATORY (INHALATION) EVERY 6 HOURS PRN
Status: DISCONTINUED | OUTPATIENT
Start: 2020-12-03 | End: 2020-12-03

## 2020-12-03 RX ORDER — MONTELUKAST SODIUM 10 MG/1
10 TABLET ORAL NIGHTLY
Status: CANCELLED | OUTPATIENT
Start: 2020-12-03

## 2020-12-03 RX ADMIN — HEPARIN SODIUM 5000 UNITS: 5000 INJECTION INTRAVENOUS; SUBCUTANEOUS at 21:14

## 2020-12-03 RX ADMIN — SODIUM CHLORIDE, PRESERVATIVE FREE 10 ML: 5 INJECTION INTRAVENOUS at 21:15

## 2020-12-03 RX ADMIN — DOXYCYCLINE 100 MG: 100 INJECTION, POWDER, LYOPHILIZED, FOR SOLUTION INTRAVENOUS at 05:45

## 2020-12-03 RX ADMIN — DOXYCYCLINE 100 MG: 100 INJECTION, POWDER, LYOPHILIZED, FOR SOLUTION INTRAVENOUS at 17:06

## 2020-12-03 RX ADMIN — HEPARIN SODIUM 5000 UNITS: 5000 INJECTION INTRAVENOUS; SUBCUTANEOUS at 00:48

## 2020-12-03 RX ADMIN — METHYLPREDNISOLONE SODIUM SUCCINATE 40 MG: 40 INJECTION, POWDER, FOR SOLUTION INTRAMUSCULAR; INTRAVENOUS at 17:05

## 2020-12-03 RX ADMIN — MONTELUKAST SODIUM 10 MG: 10 TABLET, COATED ORAL at 21:14

## 2020-12-03 RX ADMIN — CEFTRIAXONE 1 G: 1 INJECTION, POWDER, FOR SOLUTION INTRAMUSCULAR; INTRAVENOUS at 17:06

## 2020-12-03 RX ADMIN — IPRATROPIUM BROMIDE AND ALBUTEROL SULFATE 3 ML: .5; 3 SOLUTION RESPIRATORY (INHALATION) at 13:20

## 2020-12-03 RX ADMIN — IPRATROPIUM BROMIDE 0.5 MG: 0.5 SOLUTION RESPIRATORY (INHALATION) at 19:36

## 2020-12-03 RX ADMIN — MAGNESIUM SULFATE HEPTAHYDRATE 2 G: 40 INJECTION, SOLUTION INTRAVENOUS at 00:50

## 2020-12-03 RX ADMIN — HEPARIN SODIUM 5000 UNITS: 5000 INJECTION INTRAVENOUS; SUBCUTANEOUS at 17:05

## 2020-12-03 RX ADMIN — SODIUM CHLORIDE, PRESERVATIVE FREE 10 ML: 5 INJECTION INTRAVENOUS at 08:29

## 2020-12-03 RX ADMIN — IPRATROPIUM BROMIDE AND ALBUTEROL SULFATE 3 ML: .5; 3 SOLUTION RESPIRATORY (INHALATION) at 03:25

## 2020-12-03 RX ADMIN — METHYLPREDNISOLONE SODIUM SUCCINATE 40 MG: 40 INJECTION, POWDER, FOR SOLUTION INTRAMUSCULAR; INTRAVENOUS at 05:44

## 2020-12-03 RX ADMIN — IPRATROPIUM BROMIDE AND ALBUTEROL SULFATE 3 ML: .5; 3 SOLUTION RESPIRATORY (INHALATION) at 01:31

## 2020-12-03 NOTE — CONSULTS
Diabetes Education    Patient Name:  Tiera Mendoza  YOB: 1975  MRN: [id number]  Admit Date:  12/2/2020        A1C 5.7 Spoke to Dr Rodrigez, The patient had gestational diabetes and A1C and blood glucose are within normal range. If any questions or concerns Please re-consult or call. Thank you      Electronically signed by:  Britney Zaragoza RN  12/03/20 09:08 EST

## 2020-12-03 NOTE — PLAN OF CARE
Goal Outcome E     No new issues noted at this time. Will continue to monitor. Continue plan of care.

## 2020-12-03 NOTE — PLAN OF CARE
Goal Outcome Evaluation:  Plan of Care Reviewed With: patient  Progress: improving   Patient resting quietly thorough out the day.  Heart rate running the 100's to the 120's then with activity the rate increases to 140's to 150's.  MD notified.  Patient denies any symptoms she is short of air when checking the patient out.  Patient has a flushed face today and appears to be tired.  States she feels much better than she did upon arrival but continues to feel bad. Will continue to monitor and provide care as indicated/

## 2020-12-03 NOTE — PAYOR COMM NOTE
"[facility]  NPI:[id number]    Utilization Review  Contact: Kristie Ferro RN  Phone: [phone]  Fax:[phone]    INITIATE INPATIENT AUTHORIZATION   ICD: J96.01   J18.9    Francisco Mendoza (45 y.o. Female)     Date of Birth Social Security Number Address Home Phone MRN    1975  [address] [facility] [address] [phone] [id number]    Zoroastrian Marital Status          None        Admission Date Admission Type Admitting Provider Attending Provider Department, Room/Bed    20 Emergency David Rodrigez MD Hacker, Bill J, MD [facility] 3 [facility], 3304/2S    Discharge Date Discharge Disposition Discharge Destination                       Attending Provider: David Rodrigez MD    Allergies: No Known Allergies    Isolation: None   Infection: COVID (rule out) (20)   Code Status: CPR    Ht: 170.2 cm (67\")   Wt: 126 kg (276 lb 12.8 oz)    Admission Cmt: None   Principal Problem: None                Active Insurance as of 2020     Primary Coverage     Payor Plan Insurance Group Employer/Plan Group    [facility] [employer] [facility]      Payor Plan Address Payor Plan Phone Number Payor Plan Fax Number Effective Dates    [address] [address]   2014 - None Entered    [place] [address]       Subscriber Name Subscriber Birth Date Member ID       FRANCISCO MENDOZA 1975 [id number]                 Emergency Contacts      (Rel.) Home Phone Work Phone Mobile Phone    philippe beauchamp (Other) [phone] -- --               History & Physical      David Rodrigez MD at 20 [facility]4              [facility] HOSPITALIST HISTORY AND PHYSICAL    Patient Identification:  Name:  Francisco Mendoza  Age:  45 y.o.  Sex:  female  :  1975  MRN:  [id number]   Visit Number:  [id number]  Admit Date: 2020   Room number:  404/04  Primary Care Physician:  Alan Martin APRN     Subjective     Chief complaint:    Chief " Complaint   Patient presents with   • Cough   • Shortness of Breath       History of presenting illness:  45 y.o. female with hx gestational DM, distant hx of substance abuse, asthma, afib who presents with 5 days of worsening shortness of breath. Patient reports the last 5 days she has had worsening cough and shortness of breath. She notes some sputum production that is mostly clear but blood tinged at times. Patient notes she has never had an asthma exacerbation before that required hospitalization. Patient notes she feels better since getting treatment in the ER. She notes she is still a little dyspneic. Patient saturating 100% on 2L NC during the exam. Patient was given Ceftriaxone, doxycyline, methyprednisolone and single duoneb treatment in the emergency room.     Patient notes she had DM during her third pregnancy but it resolved after birth. She noted hx of polysubstance drug use but has not used any illicit substances since 2017.   ---------------------------------------------------------------------------------------------------------------------   Review of Systems   Constitutional: Negative.    HENT: Positive for congestion.    Eyes: Negative.    Respiratory: Positive for cough and shortness of breath.    Cardiovascular: Negative.    Gastrointestinal: Negative.    Endocrine: Negative.    Genitourinary: Negative.    Musculoskeletal: Negative.    Skin: Negative.    Allergic/Immunologic: Negative.    Neurological: Negative.    Hematological: Negative.    Psychiatric/Behavioral: Negative.      ---------------------------------------------------------------------------------------------------------------------   Past Medical History:   Diagnosis Date   • Asthma    • Diabetes mellitus (CMS/HCC)     GDM last pregnancy   • History of atrial fibrillation    • Substance abuse (CMS/HCC)      Past Surgical History:   Procedure Laterality Date   •  SECTION      x 3   • HIP FRACTURE SURGERY  2017     No  "family history on file.  Social History     Socioeconomic History   • Marital status:      Spouse name: Not on file   • Number of children: Not on file   • Years of education: Not on file   • Highest education level: Not on file   Tobacco Use   • Smoking status: Never Smoker   • Smokeless tobacco: Never Used   Substance and Sexual Activity   • Alcohol use: No   • Drug use: Yes     Types: Hydrocodone, Codeine     Comment: \"stopped around 6 weeks pregnant\"   • Sexual activity: Defer     ---------------------------------------------------------------------------------------------------------------------   Allergies:  Patient has no known allergies.  ---------------------------------------------------------------------------------------------------------------------   Medications below are reported home medications pulling from within the system; at this time, these medications have not been reconciled unless otherwise specified and are in the verification process for further verifcation as current home medications.    Prior to Admission Medications     Prescriptions Last Dose Informant Patient Reported? Taking?    albuterol (PROVENTIL HFA;VENTOLIN HFA) 108 (90 BASE) MCG/ACT inhaler  Self Yes No    Inhale 2 puffs Every 4 (Four) Hours As Needed for Wheezing.    montelukast (SINGULAIR) 10 MG tablet  Self Yes No    Take 10 mg by mouth Every Night.    Prenatal Vit-Fe Fumarate-FA (PRENATAL 27-1) 27-1 MG tablet tablet  Self Yes No    Take  by mouth Daily.        Objective     Vital Signs:  Temp:  [98.3 °F (36.8 °C)] 98.3 °F (36.8 °C)  Heart Rate:  [100-130] 103  Resp:  [20-24] 22  BP: (111-123)/(57-80) 117/80    No data found.  SpO2:  [86 %-100 %] 100 %  on  Flow (L/min):  [2] 2;   Device (Oxygen Therapy): nasal cannula  Body mass index is 40.72 kg/m².    Wt Readings from Last 3 Encounters:   12/02/20 118 kg (260 lb)   01/01/19 118 kg (260 lb)   10/23/18 131 kg (288 lb)      " ---------------------------------------------------------------------------------------------------------------------   Physical Exam:  Constitutional:  Well-developed and well-nourished.       HENT:  Head: Normocephalic and atraumatic.  Mouth:  Moist mucous membranes.    Eyes:  Conjunctivae and EOM are normal.  Pupils are equal, round, and reactive to light.  No scleral icterus.  Cardiovascular:  Normal rate, regular rhythm and normal heart sounds with no murmur.  Pulmonary/Chest: Dyspneic when talking in long sentences, expiratory wheezing noted bilaterally   Abdominal:  Soft.  Bowel sounds are normal.  No distension and no tenderness.   Musculoskeletal:  No edema, no tenderness, and no deformity.  No red or swollen joints anywhere.    Neurological:  Alert and oriented to person, place, and time.  No cranial nerve deficit.  No focal neurological deficit.   Skin:  Skin is warm and dry.  No rash noted.  No pallor.   Peripheral vascular:  No edema and strong pulses on all 4 extremities.    ---------------------------------------------------------------------------------------------------------------------  EKG:  Personally reviewed. Sinus tachycardia. No STEMI.   ECG 12 Lead             Telemetry:  Sinus rhythm around rate of 110.     I have personally looked at both the EKG and the telemetry strips.    Last echocardiogram:     --------------------------------------------------------------------------------------------------------------------  Labs:  Results from last 7 days   Lab Units 12/02/20  1450 12/02/20  1317   LACTATE mmol/L 1.2  --    WBC 10*3/mm3  --  17.76*   HEMOGLOBIN g/dL  --  12.6   HEMATOCRIT %  --  41.7   MCV fL  --  84.1   MCHC g/dL  --  30.2*   PLATELETS 10*3/mm3  --  409     Results from last 7 days   Lab Units 12/02/20  1707   PH, ARTERIAL pH units 7.364   PO2 ART mm Hg 58.0*   PCO2, ARTERIAL mm Hg 47.2*   HCO3 ART mmol/L 26.9*     Results from last 7 days   Lab Units 12/02/20  1317   SODIUM  mmol/L 144   POTASSIUM mmol/L 4.6   CHLORIDE mmol/L 105   CO2 mmol/L 29.2*   BUN mg/dL 9   CREATININE mg/dL 0.74   EGFR IF NONAFRICN AM mL/min/1.73 85   CALCIUM mg/dL 9.1   GLUCOSE mg/dL 104*   ALBUMIN g/dL 4.07   BILIRUBIN mg/dL 0.5   ALK PHOS U/L 109   AST (SGOT) U/L 24   ALT (SGPT) U/L 13   Estimated Creatinine Clearance: 129 mL/min (by C-G formula based on SCr of 0.74 mg/dL).    No results found for: AMMONIA          No results found for: HGBA1C, POCGLU  Lab Results   Component Value Date    TSH 2.837 07/08/2018    FREET4 1.00 07/08/2018     Lab Results   Component Value Date    PREGTESTUR Negative 01/01/2019     Pain Management Panel     Pain Management Panel Latest Ref Rng & Units 1/1/2019 7/8/2018    AMPHETAMINES SCREEN, URINE Negative Negative Negative    BARBITURATES SCREEN Negative Negative Negative    BENZODIAZEPINE SCREEN, URINE Negative Negative Negative    BUPRENORPHINEUR Negative Negative Negative    COCAINE SCREEN, URINE Negative Negative Negative    METHADONE SCREEN, URINE Negative Negative Negative        Brief Urine Lab Results     None        No results found for: BLOODCX  No results found for: URINECX  No results found for: WOUNDCX  No results found for: STOOLCX    I have personally looked at the labs and they are summarized above.  ----------------------------------------------------------------------------------------------------------------------  Detailed radiology reports for the last 24 hours:    Imaging Results (Last 24 Hours)     Procedure Component Value Units Date/Time    XR Chest 1 View [[id number]] Collected: 12/02/20 1423     Updated: 12/02/20 1429    Narrative:      EXAM:    XR Chest, 1 View     EXAM DATE:    12/2/2020 1:24 PM     CLINICAL HISTORY:    soa     TECHNIQUE:    Frontal view of the chest.     COMPARISON:    01/01/2019     FINDINGS:    LUNGS:  Patchy bibasilar airspace disease.    PLEURAL SPACE:  Unremarkable.  No pneumothorax.    HEART:  Unremarkable.  No cardiomegaly.     MEDIASTINUM:  Unremarkable.    BONES/JOINTS:  Unremarkable.       Impression:        Patchy bibasilar airspace disease.     This report was finalized on 12/2/2020 2:23 PM by Dr. Kalpesh Benavides MD.           Final impressions for the last 30 days of radiology reports:    Xr Chest 1 View    Result Date: 12/2/2020    Patchy bibasilar airspace disease.  This report was finalized on 12/2/2020 2:23 PM by Dr. Kalpesh Benavides MD.      I have personally looked at the radiology images and read the final radiology report.    Assessment & Plan      #Acute hypoxic respiratory failure 2/2 bibasilar pneumonia and asthma exacerbation   - Patient presents with tachycardiac, tachypnea, leukocytosis and PaO2 of 58 on room air   - Significant wheezing on exam consistent with asthma exacerbation   - Suspect asthma exacerbated by pneumonia.   - COVID-19 negative on admission   - Will start on ceftriaxone and doxycyline   - Will start scheduled duonebs, BID IV methylprednisolone, and give dose of IV magnesium   - Symptoms currently improving and patient saturating well on room air   - Will consider BiPAP if respiratory symptoms worsen. Will also consider CT scan of chest if no improvement.     #Hx of illicit drug use   - No active drug use per patient. Will get UDS.     #Hx of afib   - Patient currently with sinus tachycardia   - Will await med/rec     #Hx of gestational DM  - Will monitor for hyperglycemia     Will also get urine pregnancy test     F: PO  E: Replace as needed   N: CC    Code status: Full     Dispo: Admit to tele.        VTE Prophylaxis:   Mechanical Order History:     None      Pharmalogical Order History:      Ordered     Dose Route Frequency Stop    Signed and Held  heparin (porcine) 5000 UNIT/ML injection 5,000 Units      5,000 Units SC Every 8 Hours Scheduled --              David Rodrigez MD  [facility]  12/02/20  18:54 EST    Electronically signed by David Rodrigez MD at 12/02/20 1910           Emergency Department Notes      Kristel Martin PCT at 12/02/20 1228        EKG completed @ 1228 and given to Dr. Iglesias @ 1232     Kristel Martin PCT  12/02/20 1258      Electronically signed by Kristel Martin PCT at 12/02/20 1258     Alba Juan RN at 12/02/20 1301        Pt states she has had a difficulty time breathing for the last 4 days . She said she called her doctors and they told her to come to the   ED to get checked out.      Alba Juan RN  12/02/20 1302      Electronically signed by Alba Juan RN at 12/02/20 1302     Yumiko Mera PA at 12/02/20 1500          Subjective   Pt has had asthma since child.   Inc cough and soa that has gotten worse over the last several days   Had steroid shot several days ago in [facility] [place]      History provided by:  Patient  Shortness of Breath  Severity:  Mild  Onset quality:  Sudden  Timing:  Constant  Progression:  Worsening  Chronicity:  New  Context: activity    Relieved by:  Nothing  Worsened by:  Deep breathing  Associated symptoms: cough and wheezing    Associated symptoms: no chest pain, no ear pain, no fever, no headaches, no rash, no sore throat and no vomiting        Review of Systems   Constitutional: Negative for chills and fever.   HENT: Negative for congestion, ear pain and sore throat.    Respiratory: Positive for cough, shortness of breath and wheezing.    Cardiovascular: Negative for chest pain.   Gastrointestinal: Negative for diarrhea, nausea and vomiting.   Genitourinary: Negative for dysuria and flank pain.   Skin: Negative for rash.   Neurological: Negative for headaches.   Psychiatric/Behavioral: The patient is not nervous/anxious.    All other systems reviewed and are negative.      Past Medical History:   Diagnosis Date   • Asthma    • Diabetes mellitus (CMS/HCC)     GDM last pregnancy   • History of atrial fibrillation    • Substance abuse (CMS/HCC)        No Known Allergies    Past Surgical History:   Procedure  "Laterality Date   •  SECTION      x 3   • HIP FRACTURE SURGERY  2017       No family history on file.    Social History     Socioeconomic History   • Marital status:      Spouse name: Not on file   • Number of children: Not on file   • Years of education: Not on file   • Highest education level: Not on file   Tobacco Use   • Smoking status: Never Smoker   • Smokeless tobacco: Current User     Types: Chew   Substance and Sexual Activity   • Alcohol use: No   • Drug use: Yes     Types: Hydrocodone, Codeine     Comment: \"stopped around 6 weeks pregnant\"   • Sexual activity: Defer           Objective   Physical Exam  Vitals signs and nursing note reviewed.   Constitutional:       Appearance: She is well-developed.   HENT:      Head: Normocephalic.   Neck:      Musculoskeletal: Neck supple.   Cardiovascular:      Rate and Rhythm: Normal rate and regular rhythm.   Pulmonary:      Effort: Pulmonary effort is normal.      Breath sounds: Examination of the right-upper field reveals wheezing. Examination of the left-upper field reveals wheezing. Examination of the right-middle field reveals wheezing. Examination of the right-lower field reveals wheezing. Examination of the left-lower field reveals wheezing. Wheezing present.   Abdominal:      General: Bowel sounds are normal.      Palpations: Abdomen is soft.      Tenderness: There is no abdominal tenderness.   Musculoskeletal: Normal range of motion.   Skin:     General: Skin is warm and dry.   Neurological:      Mental Status: She is alert and oriented to person, place, and time.   Psychiatric:         Behavior: Behavior normal.         Thought Content: Thought content normal.         Judgment: Judgment normal.         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     Alba Juan RN  12/02/20 1858      Electronically signed by Alba Juan RN at 12/02/20 1858     Alba Juan RN at 12/02/20 2000        Pt left unit to go to the floor 3S. AAOx4. Respirations even.      Alba Juan RN  12/02/20 2001      Electronically signed by Alba Juan RN at 12/02/20 2001       Vital Signs (last day)     Date/Time   Temp   Temp src   Pulse   Resp   BP   Patient Position   SpO2    12/03/20 0325   --   --   102   22   --   --   93    12/03/20 0252   97.7 (36.5)   Tympanic   114   22   115/73   Lying   97    12/03/20 0139   --   --   107   22   --   --   97    12/03/20 0131   --   --   113   20   --   --   93    12/02/20 2015   97.4 (36.3)   Tympanic   102   24   136/81   Lying   96    12/02/20 1849   --   --   103   22   117/80   Sitting   100    12/02/20 1741   --   --   (!) 130   --   --   --   (!) 86    12/02/20 1735   --   --   100   --   111/57   Lying   93    12/02/20 1442   --   --   114   24   --   --   --    12/02/20 1435   --   --   107   20   --   --   94    12/02/20 1213   98.3 (36.8)   Oral   (!) 126   24   123/74   Lying   100                Facility-Administered Medications as of 12/3/2020   Medication Dose Route Frequency Provider Last Rate Last Admin   • [COMPLETED] cefTRIAXone (ROCEPHIN) 1 g/100 mL 0.9% NS (MBP)  1 g Intravenous Once Yumiko Mera PA   Stopped at 12/02/20 1606   • cefTRIAXone (ROCEPHIN) 1 g/100 mL 0.9% NS (MBP)  1 g Intravenous Q24H David Rodrigez MD       • [COMPLETED] doxycycline (VIBRAMYCIN) 100 mg/100 mL 0.9% NS MBP  100 mg Intravenous Once Yumiko Mera PA   100 mg at 12/02/20 1849   • doxycycline (VIBRAMYCIN)  100 mg/100 mL 0.9% NS MBP  100 mg Intravenous Q12H David Rodrigez MD   100 mg at 20 0545   • heparin (porcine) 5000 UNIT/ML injection 5,000 Units  5,000 Units Subcutaneous Q8H David Rodrigez MD   5,000 Units at 20 0048   • [] influenza vac split quad (FLUZONE,FLUARIX,AFLURIA,FLULAVAL) injection 0.5 mL  0.5 mL Intramuscular During Hospitalization David Rodrigez MD       • [COMPLETED] ipratropium-albuterol (DUO-NEB) nebulizer solution 3 mL  3 mL Nebulization Once Yumiko Mera PA   3 mL at 20 1435   • ipratropium-albuterol (DUO-NEB) nebulizer solution 3 mL  3 mL Nebulization Q4H - RT David Rodrigez MD   3 mL at 20 0325   • [COMPLETED] magnesium sulfate 2g/50 mL (PREMIX) infusion  2 g Intravenous Once David Rodrigez MD   2 g at 20 0050   • [COMPLETED] methylPREDNISolone sodium succinate (SOLU-Medrol) injection 125 mg  125 mg Intravenous Once Yumiko Mera PA   125 mg at 20 1740   • methylPREDNISolone sodium succinate (SOLU-Medrol) injection 40 mg  40 mg Intravenous Q12H David Rodrigez MD   40 mg at 20 0544   • nitroglycerin (NITROSTAT) SL tablet 0.4 mg  0.4 mg Sublingual Q5 Min PRN David Rodrigez MD       • sodium chloride 0.9 % flush 10 mL  10 mL Intravenous PRN Yumiko Mera PA       • sodium chloride 0.9 % flush 10 mL  10 mL Intravenous Q12H David Rodrigez MD       • sodium chloride 0.9 % flush 10 mL  10 mL Intravenous PRN David Rodrigez MD       • [COMPLETED] sodium chloride 0.9% - IBW for BMI > 30 bolus 1,848 mL  30 mL/kg (Ideal) Intravenous Once Yumiko Mera PA   Stopped at 20 1608     Physician Progress Notes (all)    No notes of this type exist for this encounter.         Consult Notes (all)    No notes of this type exist for this encounter.

## 2020-12-03 NOTE — PROGRESS NOTES
[facility] HOSPITALIST PROGRESS NOTE     Patient Identification:  Name:  Tiera Mendoza  Age:  45 y.o.  Sex:  female  :  1975  MRN:  [id number]  Visit Number:  [id number]  ROOM: [address] [place] [address]     Primary Care Provider:  Alan Martin APRN    Length of stay in inpatient status:  1    Subjective     Chief Compliant:    Chief Complaint   Patient presents with   • Cough   • Shortness of Breath       History of Presenting Illness:    Patient reports feeling much better. Saturating well with 2L NC. Boyfriend supportive bedside.     ROS:  Otherwise 10 point ROS negative other than documented above in HPI.     Objective     Current Hospital Meds:cefTRIAXone, 1 g, Intravenous, Q24H  doxycycline, 100 mg, Intravenous, Q12H  heparin (porcine), 5,000 Units, Subcutaneous, Q8H  methylPREDNISolone sodium succinate, 40 mg, Intravenous, Q12H  montelukast, 10 mg, Oral, Nightly  sodium chloride, 10 mL, Intravenous, Q12H         Current Antimicrobial Therapy:  Anti-Infectives (From admission, onward)    Ordered     Dose/Rate Route Frequency Start Stop    20 2341  cefTRIAXone (ROCEPHIN) 1 g/100 mL 0.9% NS (MBP)     Ordering Provider: David Rodrigez MD    1 g  over 30 Minutes Intravenous Every 24 Hours 20 1500 20 1459    20 2341  doxycycline (VIBRAMYCIN) 100 mg/100 mL 0.9% NS MBP     Ordering Provider: David Rodrigez MD    100 mg Intravenous Every 12 Hours 20 0600 20 0559    20 1756  doxycycline (VIBRAMYCIN) 100 mg/100 mL 0.9% NS MBP     Ordering Provider: Yumiko Mera PA    100 mg  over 60 Minutes Intravenous Once 20 1758 20 1949    20 1521  cefTRIAXone (ROCEPHIN) 1 g/100 mL 0.9% NS (MBP)     Ordering Provider: Yumiko Mera PA    1 g  over 30 Minutes Intravenous Once 20 1523 20 1606        Current Diuretic Therapy:  Diuretics (From admission, onward)    None         ----------------------------------------------------------------------------------------------------------------------  Vital Signs:  Temp:  [97 °F (36.1 °C)-97.7 °F (36.5 °C)] 97.5 °F (36.4 °C)  Heart Rate:  [] 116  Resp:  [18-24] 18  BP: ()/(53-81) 112/65  SpO2:  [86 %-100 %] 95 %  on  Flow (L/min):  [2] 2;   Device (Oxygen Therapy): nasal cannula  Body mass index is 43.35 kg/m².    Wt Readings from Last 3 Encounters:   12/03/20 126 kg (276 lb 12.8 oz)   01/01/19 118 kg (260 lb)   10/23/18 131 kg (288 lb)     Intake & Output (last 3 days)       11/30 0701 - 12/01 0700 12/01 0701 - 12/02 0700 12/02 0701 - 12/03 0700 12/03 0701 - 12/04 0700    P.O.   360 840    I.V. (mL/kg)   66 (0.5)     IV Piggyback   1947     Total Intake(mL/kg)   2374 (18.8) 840 (6.7)    Urine (mL/kg/hr)   700 1300 (1.1)    Total Output   700 1300    Net   +6122 -261                Diet Regular; Consistent Carbohydrate  ----------------------------------------------------------------------------------------------------------------------  Physical exam:  Constitutional:  Well-developed and well-nourished.  No respiratory distress.      HENT:  Head:  Normocephalic and atraumatic.  Mouth:  Moist mucous membranes.    Eyes:  Conjunctivae and EOM are normal. No scleral icterus.    Neck:  Neck supple.  No JVD present.    Cardiovascular:  Normal rate, regular rhythm and normal heart sounds with no murmur.  Pulmonary/Chest:  Much improved bilateral expiratory wheezing.   Abdominal:  Soft.  Bowel sounds are normal.  No distension and no tenderness.   Musculoskeletal:  No edema, no tenderness, and no deformity.  No red or swollen joints anywhere.    Neurological:  Alert and oriented to person, place, and time.  No cranial nerve deficit.  No tongue deviation.  No facial droop.  No slurred speech.   Skin:  Skin is warm and dry. No rash noted. No pallor.   Peripheral vascular:  Pulses in all 4 extremities with no clubbing, no cyanosis, no  edema.  ----------------------------------------------------------------------------------------------------------------------  Tele:    ----------------------------------------------------------------------------------------------------------------------  Results from last 7 days   Lab Units 12/03/20 0131 12/02/20  1450 12/02/20  1317   LACTATE mmol/L  --  1.2  --    WBC 10*3/mm3 8.42  --  17.76*   HEMOGLOBIN g/dL 12.0  --  12.6   HEMATOCRIT % 39.9  --  41.7   MCV fL 85.1  --  84.1   MCHC g/dL 30.1*  --  30.2*   PLATELETS 10*3/mm3 359  --  409     Results from last 7 days   Lab Units 12/02/20  1707   PH, ARTERIAL pH units 7.364   PO2 ART mm Hg 58.0*   PCO2, ARTERIAL mm Hg 47.2*   HCO3 ART mmol/L 26.9*     Results from last 7 days   Lab Units 12/03/20 0131 12/02/20  1317   SODIUM mmol/L 134* 144   POTASSIUM mmol/L 4.7 4.6   CHLORIDE mmol/L 105 105   CO2 mmol/L 23.0 29.2*   BUN mg/dL 9 9   CREATININE mg/dL 0.67 0.74   EGFR IF NONAFRICN AM mL/min/1.73 96 85   CALCIUM mg/dL 8.9 9.1   GLUCOSE mg/dL 126* 104*   ALBUMIN g/dL  --  4.07   BILIRUBIN mg/dL  --  0.5   ALK PHOS U/L  --  109   AST (SGOT) U/L  --  24   ALT (SGPT) U/L  --  13   Estimated Creatinine Clearance: 147.8 mL/min (by C-G formula based on SCr of 0.67 mg/dL).  No results found for: AMMONIA              Hemoglobin A1C   Date/Time Value Ref Range Status   12/03/2020 0131 5.70 (H) 4.80 - 5.60 % Final     Glucose   Date/Time Value Ref Range Status   12/03/2020 1605 145 (H) 70 - 130 mg/dL Final   12/03/2020 1044 127 70 - 130 mg/dL Final   12/03/2020 0733 113 70 - 130 mg/dL Final     Lab Results   Component Value Date    TSH 2.837 07/08/2018    FREET4 1.00 07/08/2018     Lab Results   Component Value Date    PREGTESTUR Negative 12/03/2020     Pain Management Panel     Pain Management Panel Latest Ref Rng & Units 12/3/2020 1/1/2019    AMPHETAMINES SCREEN, URINE Negative Positive(A) Negative    BARBITURATES SCREEN Negative Negative Negative    BENZODIAZEPINE  SCREEN, URINE Negative Negative Negative    BUPRENORPHINEUR Negative Negative Negative    COCAINE SCREEN, URINE Negative Negative Negative    METHADONE SCREEN, URINE Negative Negative Negative        Brief Urine Lab Results  (Last result in the past 365 days)      Color   Clarity   Blood   Leuk Est   Nitrite   Protein   CREAT   Urine HCG        12/03/20 0059               Negative         Blood Culture   Date Value Ref Range Status   12/02/2020 No growth at 24 hours  Preliminary   12/02/2020 No growth at 24 hours  Preliminary     No results found for: URINECX  No results found for: WOUNDCX  No results found for: STOOLCX  No results found for: RESPCX  No results found for: AFBCX  Results from last 7 days   Lab Units 12/02/20  1450   LACTATE mmol/L 1.2       I have personally looked at the labs and they are summarized above.  ----------------------------------------------------------------------------------------------------------------------  Detailed radiology reports for the last 24 hours:    Imaging Results (Last 24 Hours)     ** No results found for the last 24 hours. **        Assessment & Plan    #Acute hypoxic respiratory failure 2/2 bibasilar pneumonia and asthma exacerbation   - Patient presents with tachycardiac, tachypnea, leukocytosis and PaO2 of 58 on room air   - Significant wheezing on exam consistent with asthma exacerbation   - Suspect asthma exacerbated by pneumonia.   - COVID-19 negative on admission   - Will start on ceftriaxone and doxycyline   - Will continue duonebs, BID IV methylprednisolone, s/p dose of magnesium. Will restart home singulair.   - Symptoms greatly improved. Still on 2L NC, will wean as able.     #Sinus tachycardia  - Now asthma attack has improved will decrease duonebs to Q6hr PRN.      #Methamphetamine use   - Patient initially denied use but now admits to inhaling meth   - Likely contributed to presentation   - Recommend cessation and outpatient f/u      #Hx of afib   -  Patient currently with sinus tachycardia   - Will await med/rec      #Hx of gestational DM  - Will monitor for hyperglycemia      Will also get urine pregnancy test      F: PO  E: Replace as needed   N: CC     Code status: Full      Dispo: Admit to tele.         VTE Prophylaxis:   Mechanical Order History:     None      Pharmalogical Order History:      Ordered     Dose Route Frequency Stop    12/02/20 8240  heparin (porcine) 5000 UNIT/ML injection 5,000 Units      5,000 Units SC Every 8 Hours Scheduled --                David Rodrigez MD  [facility]  12/03/20  16:40 EST

## 2020-12-03 NOTE — PROGRESS NOTES
Discharge Planning Assessment   Peter     Patient Name: Tiera Mendoza  MRN: [id number]  Today's Date: 12/3/2020    Admit Date: 12/2/2020    Discharge Needs Assessment     Row Name 12/03/20 1126       Living Environment    Lives With  significant other    Current Living Arrangements  home/apartment/condo    Primary Care Provided by  self    Provides Primary Care For  no one    Family Caregiver if Needed  significant other    Quality of Family Relationships  helpful;involved;supportive       Transition Planning    Patient/Family Anticipates Transition to  home    Transportation Anticipated  car, drives self;family or friend will provide       Discharge Needs Assessment    Equipment Currently Used at Home  none        Discharge Plan     Row Name 12/03/20 1128       Plan    Plan  Pt admitted on 12/2/20.  SS received consult per Case Management for discharge planning, UDS +amphet.  SS spoke with pt on this date.  Pt lives at home with significant other and plans to return home at discharge.  Pt currently does not utilize home health or DME.  Pt stated no issues with substances and stated no need for community resources for assistance with substances.  Pt's PCP is Alan Martin.  SS will follow.        Continued Care and Services - Admitted Since 12/2/2020    Coordination has not been started for this encounter.         Demographic Summary     Row Name 12/03/20 1125       General Information    Admission Type  inpatient    Referral Source  nursing    Reason for Consult  discharge planning    Preferred Language  English       ANGEL Hair

## 2020-12-03 NOTE — PLAN OF CARE
Goal Outcome Evaluation:         Patient reports no new issues at this time. Will continue to monitor. Continue plan of care.

## 2020-12-03 NOTE — PLAN OF CARE
Goal Outcome Evaluation:         Patient reports no new issues at this time. Wheezing has been improving throughout the night with duonebs. Patient occasionally tachycardic in the 130s upon exertion but asymptomatic and vitals WNL. MD Rodrigez aware. Will continue to monitor. Continue plan of care.

## 2020-12-04 VITALS
BODY MASS INDEX: 43.44 KG/M2 | HEIGHT: 67 IN | DIASTOLIC BLOOD PRESSURE: 72 MMHG | WEIGHT: 276.8 LBS | OXYGEN SATURATION: 94 % | HEART RATE: 103 BPM | TEMPERATURE: 98.6 F | SYSTOLIC BLOOD PRESSURE: 106 MMHG | RESPIRATION RATE: 20 BRPM

## 2020-12-04 PROCEDURE — 99239 HOSP IP/OBS DSCHRG MGMT >30: CPT | Performed by: INTERNAL MEDICINE

## 2020-12-04 PROCEDURE — 94799 UNLISTED PULMONARY SVC/PX: CPT

## 2020-12-04 RX ADMIN — IPRATROPIUM BROMIDE 0.5 MG: 0.5 SOLUTION RESPIRATORY (INHALATION) at 01:15

## 2020-12-04 NOTE — NURSING NOTE
Advised by Marielle KNOWLES that pt's  was present in room at 0100 safety check after being told at beginning of shift that visiting hours were over. Per Marielle, pt stated the  had no way home. [facility]  being [facility]. Per Pat,  can stay in lobby while wearing a mask but must leave this floor. Informed pt the  would need to leave the room and she became irate and demanding to leave AMA. Informed HPCM, papers printed, educated pt on possible dangers of leaving AMA w/ ELENI Palomares present. Pt signed papers, IV removed, belongings bag provided.

## 2020-12-04 NOTE — PROGRESS NOTES
Discharge Planning Assessment  SANDRA Green     Patient Name: Tiera Mendoza  MRN: [id number]  Today's Date: 12/4/2020    Admit Date: 12/2/2020      Discharge Plan     Row Name 12/04/20 0920       Plan    Final Discharge Disposition Code  07 - left AMA    Final Note  Pt signed out CHRISTY AlanisW

## 2020-12-04 NOTE — NURSING NOTE
Notified Nomi Kohler, Pat house supervisor, and SIMRAN Mayo of patient leaving AMA. Security notified of patient leaving room 304 to lobby to wait for ride.

## 2020-12-04 NOTE — DISCHARGE SUMMARY
[facility] HOSPITALISTS DISCHARGE SUMMARY    Patient Identification:  Name:  Tiera Mendoza  Age:  45 y.o.  Sex:  female  :  1975  MRN:  [id number]  Visit Number:  [id number]    Date of Admission: 2020  Date of Discharge:  2020    PCP: Alan Martin APRN    DISCHARGE DIAGNOSIS  Acute hypoxic respiratory failure 2/2 bibasilar pneumonia and asthma exacerbation   #Sinus tachycardia  #Methamphetamine use   #Hx of afib   #Hx of gestational DM      CONSULTS   None    PROCEDURES PERFORMED  None    HOSPITAL COURSE  Patient is a 45 y.o. female presented on 20 to [facility] complaining of shortness of breath and cough.  Please see the admitting history and physical for further details.      Ms. Mendoza is our 44 yo F with hx of asthma, methamphetamine use, afib, gestational DM who presented with shortness of breath.  Patient presents with tachycardiac, tachypnea, leukocytosis and PaO2 of 58 on room air. Patient had significant wheezing on exam consistent with asthma exacerbation. Imaging suggestive of patchy bilateral basilar airspace disease that I suspect represent pneumonia that likely exacerbated her asthma. Started patient on scheduled duonebs, IV methylprednisolone, and gave a dose of IV magnesium. Patient saturating well on 2L NC and symptoms were greatly improving. Patient had significant sinus tachycardia likely from beta agonist or meth use. I transitioned to Atrovent. Patient started on ceftriaxone and doxycycline with resolution in leukocytosis. Unfortunately, during the night on 12/3 her significant other snuck into the hospital and patient left AMA after he was asked to leave as per hospital visitor policy. Night team was notified.     VITAL SIGNS:  Temp:  [97.3 °F (36.3 °C)-98.6 °F (37 °C)] 98.6 °F (37 °C)  Heart Rate:  [] 103  Resp:  [18-20] 20  BP: ()/(53-72) 106/72  SpO2:  [91 %-98 %] 94 %  on  Flow (L/min):  [2] 2;   Device (Oxygen Therapy): nasal  cannula    Body mass index is 43.35 kg/m².  Wt Readings from Last 3 Encounters:   12/03/20 126 kg (276 lb 12.8 oz)   01/01/19 118 kg (260 lb)   10/23/18 131 kg (288 lb)       PHYSICAL EXAM: Patient left AMA, exam from 12/3  Constitutional:  Well-developed and well-nourished.  No respiratory distress.      HENT:  Head:  Normocephalic and atraumatic.  Mouth:  Moist mucous membranes.    Eyes:  Conjunctivae and EOM are normal. No scleral icterus.    Neck:  Neck supple.  No JVD present.    Cardiovascular:  Normal rate, regular rhythm and normal heart sounds with no murmur.  Pulmonary/Chest:  Much improved bilateral expiratory wheezing.   Abdominal:  Soft.  Bowel sounds are normal.  No distension and no tenderness.   Musculoskeletal:  No edema, no tenderness, and no deformity.  No red or swollen joints anywhere.    Neurological:  Alert and oriented to person, place, and time.  No cranial nerve deficit.  No tongue deviation.  No facial droop.  No slurred speech.   Skin:  Skin is warm and dry. No rash noted. No pallor.   Peripheral vascular:  Pulses in all 4 extremities with no clubbing, no cyanosis, no edema.    DISCHARGE DISPOSITION   Stable    DISCHARGE MEDICATIONS:     Discharge Medications      ASK your doctor about these medications      Instructions Start Date   albuterol (2.5 MG/3ML) 0.083% nebulizer solution  Commonly known as: PROVENTIL   2.5 mg, Nebulization, Every 4 Hours PRN      albuterol sulfate  (90 Base) MCG/ACT inhaler  Commonly known as: PROVENTIL HFA;VENTOLIN HFA;PROAIR HFA   2 puffs, Inhalation, Every 4 Hours PRN      montelukast 10 MG tablet  Commonly known as: SINGULAIR   10 mg, Oral, Nightly               Follow-up Information     Alan Martin APRN .    Specialty: Certified Clinical Nurse Specialist  Contact information:  [address] [place] [id number] [phone]                    TEST  RESULTS PENDING AT DISCHARGE  Pending Labs     Order Current Status    Blood Culture - Blood,  Arm, Left Preliminary result    Blood Culture - Blood, Arm, Right Preliminary result           CODE STATUS  Code Status and Medical Interventions:   Ordered at: 12/02/20 1810     Code Status:    CPR     Medical Interventions (Level of Support Prior to Arrest):    Full       David Rodrigez MD  [facility] Hospitalist  12/04/20  06:59 EST    Please note that this discharge summary required more than 30 minutes to complete.

## 2020-12-04 NOTE — PAYOR COMM NOTE
"[facility]  NPI:[id number]    Utilization Review  Contact: Kristie Ferro RN  Phone: [phone]  Fax:[phone]    DISCHARGE NOTIFICATION              LEFT AMA      [id number]     Francisco Mendoza (45 y.o. Female)     Date of Birth Social Security Number Address Home Phone MRN    1975  [address] [employer] [facility] [address] [phone] [id number]    [employer] Marital Status          None        Admission Date Admission Type Admitting Provider Attending Provider Department, Room/Bed    12/2/20 Emergency David Rodrigez MD  [facility] 3 [facility], 3304/2S    Discharge Date Discharge Disposition Discharge Destination        12/4/2020 Left Against Medical Advice              Attending Provider: (none)   Allergies: No Known Allergies    Isolation: None   Infection: COVID (rule out) (12/02/20)   Code Status: Prior    Ht: 170.2 cm (67\")   Wt: 126 kg (276 lb 12.8 oz)    Admission Cmt: None   Principal Problem: None                Active Insurance as of 12/2/2020     Primary Coverage     Payor Plan Insurance Group Employer/Plan Group    [facility] [email or web address] [facility]      Payor Plan Address Payor Plan Phone Number Payor Plan Fax Number Effective Dates    [address] [address]   1/1/2014 - None Entered    [facility] [place] [address]       Subscriber Name Subscriber Birth Date Member ID       FRANCISCO MENDOZA 1975 [id number]                 Emergency Contacts      (Rel.) Home Phone Work Phone Mobile Phone    philippe beauchamp (Other) [phone] -- --              "

## 2020-12-04 NOTE — PLAN OF CARE
Goal Outcome Evaluation:  Plan of Care Reviewed With: patient  Progress: improving  Outcome Summary: Patient is sleeping well. Denies pain. No acute distress noted. Will continue to monitor and follow plan of care.

## 2020-12-07 LAB
BACTERIA SPEC AEROBE CULT: NORMAL
BACTERIA SPEC AEROBE CULT: NORMAL

## 2021-01-01 ENCOUNTER — APPOINTMENT (OUTPATIENT)
Dept: GENERAL RADIOLOGY | Facility: HOSPITAL | Age: 46
End: 2021-01-01

## 2021-01-01 ENCOUNTER — APPOINTMENT (OUTPATIENT)
Dept: CARDIOLOGY | Facility: HOSPITAL | Age: 46
End: 2021-01-01

## 2021-01-01 ENCOUNTER — APPOINTMENT (OUTPATIENT)
Dept: ULTRASOUND IMAGING | Facility: HOSPITAL | Age: 46
End: 2021-01-01

## 2021-01-01 ENCOUNTER — HOSPITAL ENCOUNTER (INPATIENT)
Facility: HOSPITAL | Age: 46
LOS: 10 days | End: 2021-12-19
Attending: EMERGENCY MEDICINE | Admitting: HOSPITALIST

## 2021-01-01 ENCOUNTER — APPOINTMENT (OUTPATIENT)
Dept: CT IMAGING | Facility: HOSPITAL | Age: 46
End: 2021-01-01

## 2021-01-01 VITALS
WEIGHT: 293 LBS | SYSTOLIC BLOOD PRESSURE: 54 MMHG | OXYGEN SATURATION: 55 % | TEMPERATURE: 97.6 F | HEIGHT: 68 IN | DIASTOLIC BLOOD PRESSURE: 33 MMHG | BODY MASS INDEX: 44.41 KG/M2 | HEART RATE: 32 BPM

## 2021-01-01 DIAGNOSIS — A41.9 SEPSIS, DUE TO UNSPECIFIED ORGANISM, UNSPECIFIED WHETHER ACUTE ORGAN DYSFUNCTION PRESENT (HCC): ICD-10-CM

## 2021-01-01 DIAGNOSIS — I26.93 SINGLE SUBSEGMENTAL PULMONARY EMBOLISM WITHOUT ACUTE COR PULMONALE (HCC): Primary | ICD-10-CM

## 2021-01-01 DIAGNOSIS — J18.9 PNEUMONIA DUE TO INFECTIOUS ORGANISM, UNSPECIFIED LATERALITY, UNSPECIFIED PART OF LUNG: ICD-10-CM

## 2021-01-01 LAB
A-A DO2: 569.8 MMHG (ref 0–300)
A-A DO2: 63.7 MMHG (ref 0–300)
A-A DO2: 67.7 MMHG (ref 0–300)
A-A DO2: 67.8 MMHG (ref 0–300)
A-A DO2: 79 MMHG (ref 0–300)
A-A DO2: 91.8 MMHG (ref 0–300)
ABO GROUP BLD: NORMAL
ALBUMIN SERPL-MCNC: 2.49 G/DL (ref 3.5–5.2)
ALBUMIN SERPL-MCNC: 2.63 G/DL (ref 3.5–5.2)
ALBUMIN SERPL-MCNC: 2.76 G/DL (ref 3.5–5.2)
ALBUMIN SERPL-MCNC: 2.76 G/DL (ref 3.5–5.2)
ALBUMIN SERPL-MCNC: 2.77 G/DL (ref 3.5–5.2)
ALBUMIN SERPL-MCNC: 2.8 G/DL (ref 3.5–5.2)
ALBUMIN SERPL-MCNC: 2.91 G/DL (ref 3.5–5.2)
ALBUMIN SERPL-MCNC: 2.94 G/DL (ref 3.5–5.2)
ALBUMIN SERPL-MCNC: 3.08 G/DL (ref 3.5–5.2)
ALBUMIN SERPL-MCNC: 3.1 G/DL (ref 3.5–5.2)
ALBUMIN SERPL-MCNC: 3.17 G/DL (ref 3.5–5.2)
ALBUMIN SERPL-MCNC: 3.17 G/DL (ref 3.5–5.2)
ALBUMIN SERPL-MCNC: 3.31 G/DL (ref 3.5–5.2)
ALBUMIN SERPL-MCNC: 3.84 G/DL (ref 3.5–5.2)
ALBUMIN SERPL-MCNC: 3.84 G/DL (ref 3.5–5.2)
ALBUMIN SERPL-MCNC: 3.89 G/DL (ref 3.5–5.2)
ALBUMIN SERPL-MCNC: 4.22 G/DL (ref 3.5–5.2)
ALBUMIN SERPL-MCNC: 4.49 G/DL (ref 3.5–5.2)
ALBUMIN SERPL-MCNC: 4.58 G/DL (ref 3.5–5.2)
ALBUMIN SERPL-MCNC: 4.79 G/DL (ref 3.5–5.2)
ALBUMIN UR-MCNC: 30.4 MG/DL
ALBUMIN/GLOB SERPL: 0.6 G/DL
ALBUMIN/GLOB SERPL: 0.7 G/DL
ALBUMIN/GLOB SERPL: 0.8 G/DL
ALBUMIN/GLOB SERPL: 1 G/DL
ALBUMIN/GLOB SERPL: 1.2 G/DL
ALBUMIN/GLOB SERPL: 1.7 G/DL
ALBUMIN/GLOB SERPL: 1.8 G/DL
ALBUMIN/GLOB SERPL: 1.8 G/DL
ALP SERPL-CCNC: 103 U/L (ref 39–117)
ALP SERPL-CCNC: 109 U/L (ref 39–117)
ALP SERPL-CCNC: 113 U/L (ref 39–117)
ALP SERPL-CCNC: 146 U/L (ref 39–117)
ALP SERPL-CCNC: 150 U/L (ref 39–117)
ALP SERPL-CCNC: 150 U/L (ref 39–117)
ALP SERPL-CCNC: 163 U/L (ref 39–117)
ALP SERPL-CCNC: 172 U/L (ref 39–117)
ALP SERPL-CCNC: 180 U/L (ref 39–117)
ALP SERPL-CCNC: 182 U/L (ref 39–117)
ALP SERPL-CCNC: 185 U/L (ref 39–117)
ALP SERPL-CCNC: 186 U/L (ref 39–117)
ALP SERPL-CCNC: 192 U/L (ref 39–117)
ALP SERPL-CCNC: 193 U/L (ref 39–117)
ALP SERPL-CCNC: 198 U/L (ref 39–117)
ALP SERPL-CCNC: 94 U/L (ref 39–117)
ALT SERPL W P-5'-P-CCNC: 1101 U/L (ref 1–33)
ALT SERPL W P-5'-P-CCNC: 1456 U/L (ref 1–33)
ALT SERPL W P-5'-P-CCNC: 154 U/L (ref 1–33)
ALT SERPL W P-5'-P-CCNC: 1563 U/L (ref 1–33)
ALT SERPL W P-5'-P-CCNC: 1577 U/L (ref 1–33)
ALT SERPL W P-5'-P-CCNC: 159 U/L (ref 1–33)
ALT SERPL W P-5'-P-CCNC: 1804 U/L (ref 1–33)
ALT SERPL W P-5'-P-CCNC: 1823 U/L (ref 1–33)
ALT SERPL W P-5'-P-CCNC: 1835 U/L (ref 1–33)
ALT SERPL W P-5'-P-CCNC: 1875 U/L (ref 1–33)
ALT SERPL W P-5'-P-CCNC: 191 U/L (ref 1–33)
ALT SERPL W P-5'-P-CCNC: 222 U/L (ref 1–33)
ALT SERPL W P-5'-P-CCNC: 234 U/L (ref 1–33)
ALT SERPL W P-5'-P-CCNC: 349 U/L (ref 1–33)
ALT SERPL W P-5'-P-CCNC: 480 U/L (ref 1–33)
ALT SERPL W P-5'-P-CCNC: 775 U/L (ref 1–33)
AMMONIA BLD-SCNC: 103 UMOL/L (ref 11–51)
AMORPH URATE CRY URNS QL MICRO: ABNORMAL /HPF
AMPHET+METHAMPHET UR QL: POSITIVE
AMPHETAMINES UR QL: POSITIVE
AMYLASE SERPL-CCNC: 226 U/L (ref 28–100)
ANION GAP SERPL CALCULATED.3IONS-SCNC: 11.2 MMOL/L (ref 5–15)
ANION GAP SERPL CALCULATED.3IONS-SCNC: 12 MMOL/L (ref 5–15)
ANION GAP SERPL CALCULATED.3IONS-SCNC: 12.3 MMOL/L (ref 5–15)
ANION GAP SERPL CALCULATED.3IONS-SCNC: 12.7 MMOL/L (ref 5–15)
ANION GAP SERPL CALCULATED.3IONS-SCNC: 13 MMOL/L (ref 5–15)
ANION GAP SERPL CALCULATED.3IONS-SCNC: 13.6 MMOL/L (ref 5–15)
ANION GAP SERPL CALCULATED.3IONS-SCNC: 13.8 MMOL/L (ref 5–15)
ANION GAP SERPL CALCULATED.3IONS-SCNC: 13.8 MMOL/L (ref 5–15)
ANION GAP SERPL CALCULATED.3IONS-SCNC: 14.9 MMOL/L (ref 5–15)
ANION GAP SERPL CALCULATED.3IONS-SCNC: 15 MMOL/L (ref 5–15)
ANION GAP SERPL CALCULATED.3IONS-SCNC: 15.7 MMOL/L (ref 5–15)
ANION GAP SERPL CALCULATED.3IONS-SCNC: 16.5 MMOL/L (ref 5–15)
ANION GAP SERPL CALCULATED.3IONS-SCNC: 16.5 MMOL/L (ref 5–15)
ANION GAP SERPL CALCULATED.3IONS-SCNC: 16.7 MMOL/L (ref 5–15)
ANION GAP SERPL CALCULATED.3IONS-SCNC: 17.4 MMOL/L (ref 5–15)
ANION GAP SERPL CALCULATED.3IONS-SCNC: 17.9 MMOL/L (ref 5–15)
ANION GAP SERPL CALCULATED.3IONS-SCNC: 18 MMOL/L (ref 5–15)
ANION GAP SERPL CALCULATED.3IONS-SCNC: 18.4 MMOL/L (ref 5–15)
ANION GAP SERPL CALCULATED.3IONS-SCNC: 20.4 MMOL/L (ref 5–15)
ANION GAP SERPL CALCULATED.3IONS-SCNC: 20.4 MMOL/L (ref 5–15)
ANION GAP SERPL CALCULATED.3IONS-SCNC: 21.7 MMOL/L (ref 5–15)
ANION GAP SERPL CALCULATED.3IONS-SCNC: 22.3 MMOL/L (ref 5–15)
ANION GAP SERPL CALCULATED.3IONS-SCNC: 24.3 MMOL/L (ref 5–15)
ANION GAP SERPL CALCULATED.3IONS-SCNC: 24.9 MMOL/L (ref 5–15)
ANION GAP SERPL CALCULATED.3IONS-SCNC: 25.9 MMOL/L (ref 5–15)
ANION GAP SERPL CALCULATED.3IONS-SCNC: 28.3 MMOL/L (ref 5–15)
ANISOCYTOSIS BLD QL: ABNORMAL
APAP SERPL-MCNC: <5 MCG/ML (ref 0–30)
APTT PPP: 29.2 SECONDS (ref 25.5–35.4)
APTT PPP: 30.5 SECONDS (ref 25.5–35.4)
APTT PPP: 31.7 SECONDS (ref 25.5–35.4)
APTT PPP: 37.3 SECONDS (ref 25.5–35.4)
APTT PPP: 44.2 SECONDS (ref 25.5–35.4)
APTT PPP: 50.6 SECONDS (ref 25.5–35.4)
APTT PPP: 52.2 SECONDS (ref 25.5–35.4)
APTT PPP: 57.1 SECONDS (ref 25.5–35.4)
APTT PPP: 59.1 SECONDS (ref 25.5–35.4)
APTT PPP: 64.5 SECONDS (ref 25.5–35.4)
APTT PPP: 65.1 SECONDS (ref 25.5–35.4)
APTT PPP: 67 SECONDS (ref 25.5–35.4)
APTT PPP: 77.3 SECONDS (ref 25.5–35.4)
APTT PPP: 91.7 SECONDS (ref 25.5–35.4)
APTT PPP: >100 SECONDS (ref 25.5–35.4)
ARTERIAL PATENCY WRIST A: ABNORMAL
ARTERIAL PATENCY WRIST A: POSITIVE
AST SERPL-CCNC: 101 U/L (ref 1–32)
AST SERPL-CCNC: 105 U/L (ref 1–32)
AST SERPL-CCNC: 113 U/L (ref 1–32)
AST SERPL-CCNC: 134 U/L (ref 1–32)
AST SERPL-CCNC: 148 U/L (ref 1–32)
AST SERPL-CCNC: 1567 U/L (ref 1–32)
AST SERPL-CCNC: 2069 U/L (ref 1–32)
AST SERPL-CCNC: 242 U/L (ref 1–32)
AST SERPL-CCNC: 2462 U/L (ref 1–32)
AST SERPL-CCNC: 2999 U/L (ref 1–32)
AST SERPL-CCNC: 337 U/L (ref 1–32)
AST SERPL-CCNC: 3842 U/L (ref 1–32)
AST SERPL-CCNC: 3887 U/L (ref 1–32)
AST SERPL-CCNC: 4195 U/L (ref 1–32)
AST SERPL-CCNC: 661 U/L (ref 1–32)
AST SERPL-CCNC: 97 U/L (ref 1–32)
ATMOSPHERIC PRESS: 720 MMHG
ATMOSPHERIC PRESS: 723 MMHG
ATMOSPHERIC PRESS: 730 MMHG
ATMOSPHERIC PRESS: 730 MMHG
ATMOSPHERIC PRESS: 731 MMHG
ATMOSPHERIC PRESS: 734 MMHG
ATMOSPHERIC PRESS: 735 MMHG
ATMOSPHERIC PRESS: 736 MMHG
B PARAPERT DNA SPEC QL NAA+PROBE: NOT DETECTED
B PERT DNA SPEC QL NAA+PROBE: NOT DETECTED
BACTERIA SPEC AEROBE CULT: NORMAL
BACTERIA SPEC AEROBE CULT: NORMAL
BACTERIA UR QL AUTO: ABNORMAL /HPF
BARBITURATES UR QL SCN: NEGATIVE
BASE EXCESS BLDA CALC-SCNC: -0.5 MMOL/L (ref 0–2)
BASE EXCESS BLDA CALC-SCNC: -11.4 MMOL/L (ref 0–2)
BASE EXCESS BLDA CALC-SCNC: -2.1 MMOL/L (ref 0–2)
BASE EXCESS BLDA CALC-SCNC: -3.3 MMOL/L (ref 0–2)
BASE EXCESS BLDA CALC-SCNC: -5.7 MMOL/L (ref 0–2)
BASE EXCESS BLDA CALC-SCNC: 0.6 MMOL/L (ref 0–2)
BASE EXCESS BLDV CALC-SCNC: -5.5 MMOL/L (ref 0–2)
BASE EXCESS BLDV CALC-SCNC: 0.5 MMOL/L (ref 0–2)
BASOPHILS # BLD AUTO: 0.02 10*3/MM3 (ref 0–0.2)
BASOPHILS # BLD AUTO: 0.05 10*3/MM3 (ref 0–0.2)
BASOPHILS # BLD AUTO: 0.05 10*3/MM3 (ref 0–0.2)
BASOPHILS # BLD AUTO: 0.06 10*3/MM3 (ref 0–0.2)
BASOPHILS # BLD AUTO: 0.08 10*3/MM3 (ref 0–0.2)
BASOPHILS NFR BLD AUTO: 0.1 % (ref 0–1.5)
BASOPHILS NFR BLD AUTO: 0.2 % (ref 0–1.5)
BASOPHILS NFR BLD AUTO: 0.3 % (ref 0–1.5)
BDY SITE: ABNORMAL
BENZODIAZ UR QL SCN: NEGATIVE
BH BB BLOOD EXPIRATION DATE: NORMAL
BH BB BLOOD TYPE BARCODE: 5100
BH BB BLOOD TYPE BARCODE: 6200
BH BB BLOOD TYPE BARCODE: 7300
BH BB BLOOD TYPE BARCODE: 7300
BH BB DISPENSE STATUS: NORMAL
BH BB PRODUCT CODE: NORMAL
BH BB UNIT NUMBER: NORMAL
BH CV ECHO MEAS - AO ROOT AREA (BSA CORRECTED): 1.5
BH CV ECHO MEAS - AO ROOT AREA: 8.6 CM^2
BH CV ECHO MEAS - AO ROOT DIAM: 3.3 CM
BH CV ECHO MEAS - BSA(HAYCOCK): 2.5 M^2
BH CV ECHO MEAS - BSA(HAYCOCK): 2.6 M^2
BH CV ECHO MEAS - BSA: 2.3 M^2
BH CV ECHO MEAS - BSA: 2.4 M^2
BH CV ECHO MEAS - BZI_BMI: 45.3 KILOGRAMS/M^2
BH CV ECHO MEAS - BZI_BMI: 48.1 KILOGRAMS/M^2
BH CV ECHO MEAS - BZI_METRIC_HEIGHT: 162.6 CM
BH CV ECHO MEAS - BZI_METRIC_HEIGHT: 172.7 CM
BH CV ECHO MEAS - BZI_METRIC_WEIGHT: 127 KG
BH CV ECHO MEAS - BZI_METRIC_WEIGHT: 135.2 KG
BH CV ECHO MEAS - EDV(CUBED): 227 ML
BH CV ECHO MEAS - EDV(CUBED): 227 ML
BH CV ECHO MEAS - EDV(MOD-SP4): 120 ML
BH CV ECHO MEAS - EDV(MOD-SP4): 78.7 ML
BH CV ECHO MEAS - EDV(TEICH): 186.9 ML
BH CV ECHO MEAS - EDV(TEICH): 186.9 ML
BH CV ECHO MEAS - EF(CUBED): 30.6 %
BH CV ECHO MEAS - EF(CUBED): 9.5 %
BH CV ECHO MEAS - EF(MOD-SP4): 27.5 %
BH CV ECHO MEAS - EF(MOD-SP4): 28.2 %
BH CV ECHO MEAS - EF(TEICH): 24.4 %
BH CV ECHO MEAS - EF(TEICH): 7.3 %
BH CV ECHO MEAS - ESV(CUBED): 157.5 ML
BH CV ECHO MEAS - ESV(CUBED): 205.4 ML
BH CV ECHO MEAS - ESV(MOD-SP4): 56.5 ML
BH CV ECHO MEAS - ESV(MOD-SP4): 87 ML
BH CV ECHO MEAS - ESV(TEICH): 141.3 ML
BH CV ECHO MEAS - ESV(TEICH): 173.2 ML
BH CV ECHO MEAS - FS: 11.5 %
BH CV ECHO MEAS - FS: 3.3 %
BH CV ECHO MEAS - IVS/LVPW: 0.9
BH CV ECHO MEAS - IVS/LVPW: 1.2
BH CV ECHO MEAS - IVSD: 0.9 CM
BH CV ECHO MEAS - IVSD: 1.1 CM
BH CV ECHO MEAS - LA DIMENSION: 4.8 CM
BH CV ECHO MEAS - LA/AO: 1.5
BH CV ECHO MEAS - LV DIASTOLIC VOL/BSA (35-75): 34.9 ML/M^2
BH CV ECHO MEAS - LV DIASTOLIC VOL/BSA (35-75): 49.6 ML/M^2
BH CV ECHO MEAS - LV MASS(C)D: 237.7 GRAMS
BH CV ECHO MEAS - LV MASS(C)D: 253.9 GRAMS
BH CV ECHO MEAS - LV MASS(C)DI: 112.5 GRAMS/M^2
BH CV ECHO MEAS - LV MASS(C)DI: 98.2 GRAMS/M^2
BH CV ECHO MEAS - LV SYSTOLIC VOL/BSA (12-30): 25 ML/M^2
BH CV ECHO MEAS - LV SYSTOLIC VOL/BSA (12-30): 35.9 ML/M^2
BH CV ECHO MEAS - LVIDD: 6.1 CM
BH CV ECHO MEAS - LVIDD: 6.1 CM
BH CV ECHO MEAS - LVIDS: 5.4 CM
BH CV ECHO MEAS - LVIDS: 5.9 CM
BH CV ECHO MEAS - LVLD AP4: 5.7 CM
BH CV ECHO MEAS - LVLD AP4: 7.6 CM
BH CV ECHO MEAS - LVLS AP4: 5.6 CM
BH CV ECHO MEAS - LVLS AP4: 7.3 CM
BH CV ECHO MEAS - LVOT AREA (M): 3.1 CM^2
BH CV ECHO MEAS - LVOT AREA: 3.1 CM^2
BH CV ECHO MEAS - LVOT DIAM: 2 CM
BH CV ECHO MEAS - LVPWD: 0.9 CM
BH CV ECHO MEAS - LVPWD: 1 CM
BH CV ECHO MEAS - PA ACC TIME: 0.06 SEC
BH CV ECHO MEAS - PA PR(ACCEL): 54.3 MMHG
BH CV ECHO MEAS - RAP SYSTOLE: 10 MMHG
BH CV ECHO MEAS - RVSP: 33.6 MMHG
BH CV ECHO MEAS - SI(CUBED): 30.8 ML/M^2
BH CV ECHO MEAS - SI(CUBED): 8.9 ML/M^2
BH CV ECHO MEAS - SI(MOD-SP4): 13.6 ML/M^2
BH CV ECHO MEAS - SI(MOD-SP4): 9.8 ML/M^2
BH CV ECHO MEAS - SI(TEICH): 20.2 ML/M^2
BH CV ECHO MEAS - SI(TEICH): 5.7 ML/M^2
BH CV ECHO MEAS - SV(CUBED): 21.6 ML
BH CV ECHO MEAS - SV(CUBED): 69.5 ML
BH CV ECHO MEAS - SV(MOD-SP4): 22.2 ML
BH CV ECHO MEAS - SV(MOD-SP4): 33 ML
BH CV ECHO MEAS - SV(TEICH): 13.7 ML
BH CV ECHO MEAS - SV(TEICH): 45.6 ML
BH CV ECHO MEAS - TR MAX VEL: 243 CM/SEC
BILIRUB CONJ SERPL-MCNC: 5.9 MG/DL (ref 0–0.3)
BILIRUB INDIRECT SERPL-MCNC: 3.3 MG/DL
BILIRUB SERPL-MCNC: 10 MG/DL (ref 0–1.2)
BILIRUB SERPL-MCNC: 10.7 MG/DL (ref 0–1.2)
BILIRUB SERPL-MCNC: 11.3 MG/DL (ref 0–1.2)
BILIRUB SERPL-MCNC: 2.8 MG/DL (ref 0–1.2)
BILIRUB SERPL-MCNC: 3.1 MG/DL (ref 0–1.2)
BILIRUB SERPL-MCNC: 4.1 MG/DL (ref 0–1.2)
BILIRUB SERPL-MCNC: 4.6 MG/DL (ref 0–1.2)
BILIRUB SERPL-MCNC: 4.7 MG/DL (ref 0–1.2)
BILIRUB SERPL-MCNC: 4.8 MG/DL (ref 0–1.2)
BILIRUB SERPL-MCNC: 5 MG/DL (ref 0–1.2)
BILIRUB SERPL-MCNC: 5.1 MG/DL (ref 0–1.2)
BILIRUB SERPL-MCNC: 5.5 MG/DL (ref 0–1.2)
BILIRUB SERPL-MCNC: 5.5 MG/DL (ref 0–1.2)
BILIRUB SERPL-MCNC: 6.3 MG/DL (ref 0–1.2)
BILIRUB SERPL-MCNC: 8.1 MG/DL (ref 0–1.2)
BILIRUB SERPL-MCNC: 9.2 MG/DL (ref 0–1.2)
BILIRUB SERPL-MCNC: 9.3 MG/DL (ref 0–1.2)
BILIRUB UR QL STRIP: NEGATIVE
BLD GP AB SCN SERPL QL: NEGATIVE
BLD GP AB SCN SERPL QL: NEGATIVE
BODY TEMPERATURE: 0 C
BODY TEMPERATURE: 37 C
BUN SERPL-MCNC: 103 MG/DL (ref 6–20)
BUN SERPL-MCNC: 111 MG/DL (ref 6–20)
BUN SERPL-MCNC: 111 MG/DL (ref 6–20)
BUN SERPL-MCNC: 122 MG/DL (ref 6–20)
BUN SERPL-MCNC: 124 MG/DL (ref 6–20)
BUN SERPL-MCNC: 126 MG/DL (ref 6–20)
BUN SERPL-MCNC: 126 MG/DL (ref 6–20)
BUN SERPL-MCNC: 129 MG/DL (ref 6–20)
BUN SERPL-MCNC: 22 MG/DL (ref 6–20)
BUN SERPL-MCNC: 23 MG/DL (ref 6–20)
BUN SERPL-MCNC: 35 MG/DL (ref 6–20)
BUN SERPL-MCNC: 44 MG/DL (ref 6–20)
BUN SERPL-MCNC: 51 MG/DL (ref 6–20)
BUN SERPL-MCNC: 60 MG/DL (ref 6–20)
BUN SERPL-MCNC: 63 MG/DL (ref 6–20)
BUN SERPL-MCNC: 68 MG/DL (ref 6–20)
BUN SERPL-MCNC: 71 MG/DL (ref 6–20)
BUN SERPL-MCNC: 76 MG/DL (ref 6–20)
BUN SERPL-MCNC: 82 MG/DL (ref 6–20)
BUN SERPL-MCNC: 90 MG/DL (ref 6–20)
BUN SERPL-MCNC: 95 MG/DL (ref 6–20)
BUN/CREAT SERPL: 16.5 (ref 7–25)
BUN/CREAT SERPL: 19.8 (ref 7–25)
BUN/CREAT SERPL: 20.6 (ref 7–25)
BUN/CREAT SERPL: 21 (ref 7–25)
BUN/CREAT SERPL: 21.9 (ref 7–25)
BUN/CREAT SERPL: 22.6 (ref 7–25)
BUN/CREAT SERPL: 23.4 (ref 7–25)
BUN/CREAT SERPL: 23.6 (ref 7–25)
BUN/CREAT SERPL: 23.7 (ref 7–25)
BUN/CREAT SERPL: 25.1 (ref 7–25)
BUN/CREAT SERPL: 25.8 (ref 7–25)
BUN/CREAT SERPL: 27.5 (ref 7–25)
BUN/CREAT SERPL: 30.4 (ref 7–25)
BUN/CREAT SERPL: 32.2 (ref 7–25)
BUN/CREAT SERPL: 33.1 (ref 7–25)
BUN/CREAT SERPL: 33.1 (ref 7–25)
BUN/CREAT SERPL: 36.9 (ref 7–25)
BUN/CREAT SERPL: 39.4 (ref 7–25)
BUN/CREAT SERPL: 39.4 (ref 7–25)
BUN/CREAT SERPL: 40.5 (ref 7–25)
BUN/CREAT SERPL: 40.5 (ref 7–25)
BUN/CREAT SERPL: 42.5 (ref 7–25)
BUN/CREAT SERPL: 42.7 (ref 7–25)
BUN/CREAT SERPL: 45 (ref 7–25)
BUN/CREAT SERPL: 45.9 (ref 7–25)
BUN/CREAT SERPL: 50 (ref 7–25)
BUPRENORPHINE SERPL-MCNC: NEGATIVE NG/ML
C PNEUM DNA NPH QL NAA+NON-PROBE: NOT DETECTED
C3 FRG RBC-MCNC: ABNORMAL
CALCIUM SPEC-SCNC: 7.3 MG/DL (ref 8.6–10.5)
CALCIUM SPEC-SCNC: 7.3 MG/DL (ref 8.6–10.5)
CALCIUM SPEC-SCNC: 7.4 MG/DL (ref 8.6–10.5)
CALCIUM SPEC-SCNC: 7.4 MG/DL (ref 8.6–10.5)
CALCIUM SPEC-SCNC: 7.5 MG/DL (ref 8.6–10.5)
CALCIUM SPEC-SCNC: 7.6 MG/DL (ref 8.6–10.5)
CALCIUM SPEC-SCNC: 7.8 MG/DL (ref 8.6–10.5)
CALCIUM SPEC-SCNC: 8 MG/DL (ref 8.6–10.5)
CALCIUM SPEC-SCNC: 8 MG/DL (ref 8.6–10.5)
CALCIUM SPEC-SCNC: 8.1 MG/DL (ref 8.6–10.5)
CALCIUM SPEC-SCNC: 8.3 MG/DL (ref 8.6–10.5)
CALCIUM SPEC-SCNC: 8.4 MG/DL (ref 8.6–10.5)
CALCIUM SPEC-SCNC: 8.7 MG/DL (ref 8.6–10.5)
CALCIUM SPEC-SCNC: 9 MG/DL (ref 8.6–10.5)
CALCIUM SPEC-SCNC: 9 MG/DL (ref 8.6–10.5)
CALCIUM SPEC-SCNC: 9.1 MG/DL (ref 8.6–10.5)
CALCIUM SPEC-SCNC: 9.3 MG/DL (ref 8.6–10.5)
CALCIUM SPEC-SCNC: 9.3 MG/DL (ref 8.6–10.5)
CALCIUM SPEC-SCNC: 9.4 MG/DL (ref 8.6–10.5)
CALCIUM SPEC-SCNC: 9.4 MG/DL (ref 8.6–10.5)
CALCIUM SPEC-SCNC: 9.5 MG/DL (ref 8.6–10.5)
CANNABINOIDS SERPL QL: NEGATIVE
CHLORIDE SERPL-SCNC: 93 MMOL/L (ref 98–107)
CHLORIDE SERPL-SCNC: 94 MMOL/L (ref 98–107)
CHLORIDE SERPL-SCNC: 95 MMOL/L (ref 98–107)
CHLORIDE SERPL-SCNC: 95 MMOL/L (ref 98–107)
CHLORIDE SERPL-SCNC: 96 MMOL/L (ref 98–107)
CHLORIDE SERPL-SCNC: 97 MMOL/L (ref 98–107)
CHLORIDE SERPL-SCNC: 98 MMOL/L (ref 98–107)
CHLORIDE SERPL-SCNC: 99 MMOL/L (ref 98–107)
CHLORIDE SERPL-SCNC: 99 MMOL/L (ref 98–107)
CK SERPL-CCNC: 1495 U/L (ref 20–180)
CK SERPL-CCNC: 2338 U/L (ref 20–180)
CK SERPL-CCNC: 2714 U/L (ref 20–180)
CK SERPL-CCNC: 859 U/L (ref 20–180)
CLARITY UR: ABNORMAL
CO2 BLDA-SCNC: 17.5 MMOL/L (ref 22–33)
CO2 BLDA-SCNC: 20.8 MMOL/L (ref 22–33)
CO2 BLDA-SCNC: 21.4 MMOL/L (ref 22–33)
CO2 BLDA-SCNC: 22.8 MMOL/L (ref 22–33)
CO2 BLDA-SCNC: 24 MMOL/L (ref 22–33)
CO2 BLDA-SCNC: 25.2 MMOL/L (ref 22–33)
CO2 BLDA-SCNC: 26.9 MMOL/L (ref 22–33)
CO2 BLDA-SCNC: 27.7 MMOL/L (ref 22–33)
CO2 SERPL-SCNC: 14.6 MMOL/L (ref 22–29)
CO2 SERPL-SCNC: 16.1 MMOL/L (ref 22–29)
CO2 SERPL-SCNC: 16.1 MMOL/L (ref 22–29)
CO2 SERPL-SCNC: 16.3 MMOL/L (ref 22–29)
CO2 SERPL-SCNC: 16.5 MMOL/L (ref 22–29)
CO2 SERPL-SCNC: 16.7 MMOL/L (ref 22–29)
CO2 SERPL-SCNC: 16.7 MMOL/L (ref 22–29)
CO2 SERPL-SCNC: 17 MMOL/L (ref 22–29)
CO2 SERPL-SCNC: 18.5 MMOL/L (ref 22–29)
CO2 SERPL-SCNC: 19.1 MMOL/L (ref 22–29)
CO2 SERPL-SCNC: 19.1 MMOL/L (ref 22–29)
CO2 SERPL-SCNC: 19.3 MMOL/L (ref 22–29)
CO2 SERPL-SCNC: 19.3 MMOL/L (ref 22–29)
CO2 SERPL-SCNC: 20.6 MMOL/L (ref 22–29)
CO2 SERPL-SCNC: 20.6 MMOL/L (ref 22–29)
CO2 SERPL-SCNC: 21.4 MMOL/L (ref 22–29)
CO2 SERPL-SCNC: 21.7 MMOL/L (ref 22–29)
CO2 SERPL-SCNC: 21.8 MMOL/L (ref 22–29)
CO2 SERPL-SCNC: 22 MMOL/L (ref 22–29)
CO2 SERPL-SCNC: 22 MMOL/L (ref 22–29)
CO2 SERPL-SCNC: 22.2 MMOL/L (ref 22–29)
CO2 SERPL-SCNC: 22.2 MMOL/L (ref 22–29)
CO2 SERPL-SCNC: 22.3 MMOL/L (ref 22–29)
CO2 SERPL-SCNC: 23.7 MMOL/L (ref 22–29)
CO2 SERPL-SCNC: 24 MMOL/L (ref 22–29)
CO2 SERPL-SCNC: 25.6 MMOL/L (ref 22–29)
COCAINE UR QL: NEGATIVE
COHGB MFR BLD: 1.8 % (ref 0–5)
COHGB MFR BLD: 1.8 % (ref 0–5)
COHGB MFR BLD: 2 % (ref 0–5)
COHGB MFR BLD: 2 % (ref 0–5)
COHGB MFR BLD: 2.1 % (ref 0–5)
COHGB MFR BLD: 2.4 % (ref 0–5)
COHGB MFR BLD: 2.7 % (ref 0–5)
COHGB MFR BLD: 3.1 % (ref 0–5)
COLOR UR: ABNORMAL
CREAT SERPL-MCNC: 1.05 MG/DL (ref 0.57–1)
CREAT SERPL-MCNC: 1.05 MG/DL (ref 0.57–1)
CREAT SERPL-MCNC: 2.12 MG/DL (ref 0.57–1)
CREAT SERPL-MCNC: 2.22 MG/DL (ref 0.57–1)
CREAT SERPL-MCNC: 2.44 MG/DL (ref 0.57–1)
CREAT SERPL-MCNC: 2.47 MG/DL (ref 0.57–1)
CREAT SERPL-MCNC: 2.54 MG/DL (ref 0.57–1)
CREAT SERPL-MCNC: 2.69 MG/DL (ref 0.57–1)
CREAT SERPL-MCNC: 2.71 MG/DL (ref 0.57–1)
CREAT SERPL-MCNC: 2.81 MG/DL (ref 0.57–1)
CREAT SERPL-MCNC: 2.86 MG/DL (ref 0.57–1)
CREAT SERPL-MCNC: 2.92 MG/DL (ref 0.57–1)
CREAT SERPL-MCNC: 2.99 MG/DL (ref 0.57–1)
CREAT SERPL-MCNC: 3.01 MG/DL (ref 0.57–1)
CREAT SERPL-MCNC: 3.03 MG/DL (ref 0.57–1)
CREAT SERPL-MCNC: 3.11 MG/DL (ref 0.57–1)
CREAT SERPL-MCNC: 3.11 MG/DL (ref 0.57–1)
CREAT SERPL-MCNC: 3.12 MG/DL (ref 0.57–1)
CREAT SERPL-MCNC: 3.15 MG/DL (ref 0.57–1)
CREAT SERPL-MCNC: 3.15 MG/DL (ref 0.57–1)
CREAT SERPL-MCNC: 3.18 MG/DL (ref 0.57–1)
CREAT SERPL-MCNC: 3.2 MG/DL (ref 0.57–1)
CREAT SERPL-MCNC: 3.27 MG/DL (ref 0.57–1)
CREAT SERPL-MCNC: 3.31 MG/DL (ref 0.57–1)
CREAT SERPL-MCNC: 3.35 MG/DL (ref 0.57–1)
CREAT SERPL-MCNC: 3.35 MG/DL (ref 0.57–1)
CREAT UR-MCNC: 108.5 MG/DL
CREAT UR-MCNC: 108.5 MG/DL
CREAT UR-MCNC: 68.8 MG/DL
CRP SERPL-MCNC: 16.36 MG/DL (ref 0–0.5)
CRP SERPL-MCNC: 22.87 MG/DL (ref 0–0.5)
CRP SERPL-MCNC: 26.44 MG/DL (ref 0–0.5)
CRP SERPL-MCNC: 27.13 MG/DL (ref 0–0.5)
CRP SERPL-MCNC: 30.87 MG/DL (ref 0–0.5)
CYTOLOGIST CVX/VAG CYTO: NORMAL
D DIMER PPP FEU-MCNC: >20 MCGFEU/ML (ref 0–0.5)
D-LACTATE SERPL-SCNC: 2.9 MMOL/L (ref 0.5–2)
D-LACTATE SERPL-SCNC: 3.6 MMOL/L (ref 0.5–2)
D-LACTATE SERPL-SCNC: 3.6 MMOL/L (ref 0.5–2)
D-LACTATE SERPL-SCNC: 3.9 MMOL/L (ref 0.5–2)
DAT IGG GEL: POSITIVE
DAT POLY-SP REAG RBC QL: POSITIVE
DEPRECATED RDW RBC AUTO: 49.2 FL (ref 37–54)
DEPRECATED RDW RBC AUTO: 49.6 FL (ref 37–54)
DEPRECATED RDW RBC AUTO: 49.9 FL (ref 37–54)
DEPRECATED RDW RBC AUTO: 50.4 FL (ref 37–54)
DEPRECATED RDW RBC AUTO: 51 FL (ref 37–54)
DEPRECATED RDW RBC AUTO: 51.1 FL (ref 37–54)
DEPRECATED RDW RBC AUTO: 51.5 FL (ref 37–54)
DEPRECATED RDW RBC AUTO: 51.6 FL (ref 37–54)
DEPRECATED RDW RBC AUTO: 51.7 FL (ref 37–54)
DEPRECATED RDW RBC AUTO: 51.9 FL (ref 37–54)
DEPRECATED RDW RBC AUTO: 52.5 FL (ref 37–54)
DEPRECATED RDW RBC AUTO: 52.8 FL (ref 37–54)
DEPRECATED RDW RBC AUTO: 53.1 FL (ref 37–54)
DEPRECATED RDW RBC AUTO: 53.3 FL (ref 37–54)
DEPRECATED RDW RBC AUTO: 53.4 FL (ref 37–54)
DEPRECATED RDW RBC AUTO: 53.7 FL (ref 37–54)
DEPRECATED RDW RBC AUTO: 53.7 FL (ref 37–54)
DEPRECATED RDW RBC AUTO: 53.8 FL (ref 37–54)
DEPRECATED RDW RBC AUTO: 54 FL (ref 37–54)
DEPRECATED RDW RBC AUTO: 55.4 FL (ref 37–54)
DEPRECATED RDW RBC AUTO: 56.2 FL (ref 37–54)
DEPRECATED RDW RBC AUTO: 59.7 FL (ref 37–54)
DIGOXIN SERPL-MCNC: 0.6 NG/ML (ref 0.6–1.2)
EBV DNA # BLD NAA+PROBE: NEGATIVE COPIES/ML
EBV DNA SPEC NAA+PROBE-LOG#: NORMAL {LOG_COPIES}/ML
EOSINOPHIL # BLD AUTO: 0 10*3/MM3 (ref 0–0.4)
EOSINOPHIL # BLD AUTO: 0 10*3/MM3 (ref 0–0.4)
EOSINOPHIL # BLD AUTO: 0.01 10*3/MM3 (ref 0–0.4)
EOSINOPHIL # BLD AUTO: 0.16 10*3/MM3 (ref 0–0.4)
EOSINOPHIL # BLD AUTO: 0.16 10*3/MM3 (ref 0–0.4)
EOSINOPHIL NFR BLD AUTO: 0 % (ref 0.3–6.2)
EOSINOPHIL NFR BLD AUTO: 0.6 % (ref 0.3–6.2)
EOSINOPHIL NFR BLD AUTO: 0.6 % (ref 0.3–6.2)
ERYTHROCYTE [DISTWIDTH] IN BLOOD BY AUTOMATED COUNT: 17.8 % (ref 12.3–15.4)
ERYTHROCYTE [DISTWIDTH] IN BLOOD BY AUTOMATED COUNT: 18.1 % (ref 12.3–15.4)
ERYTHROCYTE [DISTWIDTH] IN BLOOD BY AUTOMATED COUNT: 18.3 % (ref 12.3–15.4)
ERYTHROCYTE [DISTWIDTH] IN BLOOD BY AUTOMATED COUNT: 18.5 % (ref 12.3–15.4)
ERYTHROCYTE [DISTWIDTH] IN BLOOD BY AUTOMATED COUNT: 18.6 % (ref 12.3–15.4)
ERYTHROCYTE [DISTWIDTH] IN BLOOD BY AUTOMATED COUNT: 18.7 % (ref 12.3–15.4)
ERYTHROCYTE [DISTWIDTH] IN BLOOD BY AUTOMATED COUNT: 18.7 % (ref 12.3–15.4)
ERYTHROCYTE [DISTWIDTH] IN BLOOD BY AUTOMATED COUNT: 19.4 % (ref 12.3–15.4)
ERYTHROCYTE [DISTWIDTH] IN BLOOD BY AUTOMATED COUNT: 19.4 % (ref 12.3–15.4)
ERYTHROCYTE [DISTWIDTH] IN BLOOD BY AUTOMATED COUNT: 19.7 % (ref 12.3–15.4)
ERYTHROCYTE [DISTWIDTH] IN BLOOD BY AUTOMATED COUNT: 19.8 % (ref 12.3–15.4)
ERYTHROCYTE [DISTWIDTH] IN BLOOD BY AUTOMATED COUNT: 19.9 % (ref 12.3–15.4)
ERYTHROCYTE [DISTWIDTH] IN BLOOD BY AUTOMATED COUNT: 19.9 % (ref 12.3–15.4)
ERYTHROCYTE [DISTWIDTH] IN BLOOD BY AUTOMATED COUNT: 20.1 % (ref 12.3–15.4)
ERYTHROCYTE [DISTWIDTH] IN BLOOD BY AUTOMATED COUNT: 20.1 % (ref 12.3–15.4)
ERYTHROCYTE [DISTWIDTH] IN BLOOD BY AUTOMATED COUNT: 20.4 % (ref 12.3–15.4)
ERYTHROCYTE [DISTWIDTH] IN BLOOD BY AUTOMATED COUNT: 20.6 % (ref 12.3–15.4)
ERYTHROCYTE [DISTWIDTH] IN BLOOD BY AUTOMATED COUNT: 20.7 % (ref 12.3–15.4)
ERYTHROCYTE [DISTWIDTH] IN BLOOD BY AUTOMATED COUNT: 21.2 % (ref 12.3–15.4)
ERYTHROCYTE [DISTWIDTH] IN BLOOD BY AUTOMATED COUNT: 21.3 % (ref 12.3–15.4)
ERYTHROCYTE [DISTWIDTH] IN BLOOD BY AUTOMATED COUNT: 21.7 % (ref 12.3–15.4)
ERYTHROCYTE [DISTWIDTH] IN BLOOD BY AUTOMATED COUNT: 23.2 % (ref 12.3–15.4)
FACT V ACT/NOR PPP: 12 % (ref 70–150)
FACT VII ACT/NOR PPP: 23 % (ref 51–186)
FACT VIII ACT/NOR PPP: 95 % (ref 56–140)
FIBRINOGEN PPP-MCNC: 108 MG/DL (ref 173–524)
FIBRINOGEN PPP-MCNC: 111 MG/DL (ref 173–524)
FIBRINOGEN PPP-MCNC: 125 MG/DL (ref 173–524)
FIBRINOGEN PPP-MCNC: 135 MG/DL (ref 173–524)
FIBRINOGEN PPP-MCNC: <100 MG/DL (ref 173–524)
FIBRINOGEN PPP-MCNC: <70 MG/DL (ref 173–524)
FIBRINOGEN PPP-MCNC: <70 MG/DL (ref 173–524)
FLUAV RNA RESP QL NAA+PROBE: NOT DETECTED
FLUAV SUBTYP SPEC NAA+PROBE: NOT DETECTED
FLUBV RNA ISLT QL NAA+PROBE: NOT DETECTED
FLUBV RNA RESP QL NAA+PROBE: NOT DETECTED
GAS FLOW AIRWAY: 2 LPM
GAS FLOW AIRWAY: 3 LPM
GFR SERPL CREATININE-BSD FRML MDRD: 15 ML/MIN/1.73
GFR SERPL CREATININE-BSD FRML MDRD: 16 ML/MIN/1.73
GFR SERPL CREATININE-BSD FRML MDRD: 17 ML/MIN/1.73
GFR SERPL CREATININE-BSD FRML MDRD: 18 ML/MIN/1.73
GFR SERPL CREATININE-BSD FRML MDRD: 18 ML/MIN/1.73
GFR SERPL CREATININE-BSD FRML MDRD: 19 ML/MIN/1.73
GFR SERPL CREATININE-BSD FRML MDRD: 19 ML/MIN/1.73
GFR SERPL CREATININE-BSD FRML MDRD: 20 ML/MIN/1.73
GFR SERPL CREATININE-BSD FRML MDRD: 21 ML/MIN/1.73
GFR SERPL CREATININE-BSD FRML MDRD: 21 ML/MIN/1.73
GFR SERPL CREATININE-BSD FRML MDRD: 24 ML/MIN/1.73
GFR SERPL CREATININE-BSD FRML MDRD: 25 ML/MIN/1.73
GFR SERPL CREATININE-BSD FRML MDRD: 57 ML/MIN/1.73
GFR SERPL CREATININE-BSD FRML MDRD: 57 ML/MIN/1.73
GLOBULIN UR ELPH-MCNC: 2.5 GM/DL
GLOBULIN UR ELPH-MCNC: 2.6 GM/DL
GLOBULIN UR ELPH-MCNC: 2.6 GM/DL
GLOBULIN UR ELPH-MCNC: 3.4 GM/DL
GLOBULIN UR ELPH-MCNC: 3.7 GM/DL
GLOBULIN UR ELPH-MCNC: 4 GM/DL
GLOBULIN UR ELPH-MCNC: 4.2 GM/DL
GLOBULIN UR ELPH-MCNC: 4.2 GM/DL
GLOBULIN UR ELPH-MCNC: 4.3 GM/DL
GLOBULIN UR ELPH-MCNC: 4.4 GM/DL
GLOBULIN UR ELPH-MCNC: 4.8 GM/DL
GLUCOSE BLDC GLUCOMTR-MCNC: 104 MG/DL (ref 70–130)
GLUCOSE BLDC GLUCOMTR-MCNC: 129 MG/DL (ref 70–130)
GLUCOSE BLDC GLUCOMTR-MCNC: 133 MG/DL (ref 70–130)
GLUCOSE BLDC GLUCOMTR-MCNC: 135 MG/DL (ref 70–130)
GLUCOSE BLDC GLUCOMTR-MCNC: 136 MG/DL (ref 70–130)
GLUCOSE BLDC GLUCOMTR-MCNC: 138 MG/DL (ref 70–130)
GLUCOSE BLDC GLUCOMTR-MCNC: 149 MG/DL (ref 70–130)
GLUCOSE BLDC GLUCOMTR-MCNC: 149 MG/DL (ref 70–130)
GLUCOSE BLDC GLUCOMTR-MCNC: 152 MG/DL (ref 70–130)
GLUCOSE BLDC GLUCOMTR-MCNC: 153 MG/DL (ref 70–130)
GLUCOSE BLDC GLUCOMTR-MCNC: 155 MG/DL (ref 70–130)
GLUCOSE BLDC GLUCOMTR-MCNC: 155 MG/DL (ref 70–130)
GLUCOSE BLDC GLUCOMTR-MCNC: 156 MG/DL (ref 70–130)
GLUCOSE BLDC GLUCOMTR-MCNC: 162 MG/DL (ref 70–130)
GLUCOSE BLDC GLUCOMTR-MCNC: 166 MG/DL (ref 70–130)
GLUCOSE BLDC GLUCOMTR-MCNC: 171 MG/DL (ref 70–130)
GLUCOSE BLDC GLUCOMTR-MCNC: 173 MG/DL (ref 70–130)
GLUCOSE BLDC GLUCOMTR-MCNC: 173 MG/DL (ref 70–130)
GLUCOSE BLDC GLUCOMTR-MCNC: 174 MG/DL (ref 70–130)
GLUCOSE BLDC GLUCOMTR-MCNC: 175 MG/DL (ref 70–130)
GLUCOSE BLDC GLUCOMTR-MCNC: 179 MG/DL (ref 70–130)
GLUCOSE BLDC GLUCOMTR-MCNC: 180 MG/DL (ref 70–130)
GLUCOSE BLDC GLUCOMTR-MCNC: 183 MG/DL (ref 70–130)
GLUCOSE BLDC GLUCOMTR-MCNC: 191 MG/DL (ref 70–130)
GLUCOSE BLDC GLUCOMTR-MCNC: 192 MG/DL (ref 70–130)
GLUCOSE BLDC GLUCOMTR-MCNC: 193 MG/DL (ref 70–130)
GLUCOSE BLDC GLUCOMTR-MCNC: 195 MG/DL (ref 70–130)
GLUCOSE BLDC GLUCOMTR-MCNC: 197 MG/DL (ref 70–130)
GLUCOSE BLDC GLUCOMTR-MCNC: 199 MG/DL (ref 70–130)
GLUCOSE BLDC GLUCOMTR-MCNC: 202 MG/DL (ref 70–130)
GLUCOSE BLDC GLUCOMTR-MCNC: 204 MG/DL (ref 70–130)
GLUCOSE BLDC GLUCOMTR-MCNC: 207 MG/DL (ref 70–130)
GLUCOSE BLDC GLUCOMTR-MCNC: 232 MG/DL (ref 70–130)
GLUCOSE BLDC GLUCOMTR-MCNC: 98 MG/DL (ref 70–130)
GLUCOSE SERPL-MCNC: 110 MG/DL (ref 65–99)
GLUCOSE SERPL-MCNC: 113 MG/DL (ref 65–99)
GLUCOSE SERPL-MCNC: 124 MG/DL (ref 65–99)
GLUCOSE SERPL-MCNC: 128 MG/DL (ref 65–99)
GLUCOSE SERPL-MCNC: 134 MG/DL (ref 65–99)
GLUCOSE SERPL-MCNC: 136 MG/DL (ref 65–99)
GLUCOSE SERPL-MCNC: 139 MG/DL (ref 65–99)
GLUCOSE SERPL-MCNC: 144 MG/DL (ref 65–99)
GLUCOSE SERPL-MCNC: 146 MG/DL (ref 65–99)
GLUCOSE SERPL-MCNC: 146 MG/DL (ref 65–99)
GLUCOSE SERPL-MCNC: 148 MG/DL (ref 65–99)
GLUCOSE SERPL-MCNC: 148 MG/DL (ref 65–99)
GLUCOSE SERPL-MCNC: 150 MG/DL (ref 65–99)
GLUCOSE SERPL-MCNC: 151 MG/DL (ref 65–99)
GLUCOSE SERPL-MCNC: 154 MG/DL (ref 65–99)
GLUCOSE SERPL-MCNC: 155 MG/DL (ref 65–99)
GLUCOSE SERPL-MCNC: 155 MG/DL (ref 65–99)
GLUCOSE SERPL-MCNC: 161 MG/DL (ref 65–99)
GLUCOSE SERPL-MCNC: 164 MG/DL (ref 65–99)
GLUCOSE SERPL-MCNC: 165 MG/DL (ref 65–99)
GLUCOSE SERPL-MCNC: 166 MG/DL (ref 65–99)
GLUCOSE SERPL-MCNC: 171 MG/DL (ref 65–99)
GLUCOSE SERPL-MCNC: 198 MG/DL (ref 65–99)
GLUCOSE SERPL-MCNC: 50 MG/DL (ref 65–99)
GLUCOSE SERPL-MCNC: 93 MG/DL (ref 65–99)
GLUCOSE SERPL-MCNC: 97 MG/DL (ref 65–99)
GLUCOSE UR STRIP-MCNC: NEGATIVE MG/DL
GRAN CASTS URNS QL MICRO: ABNORMAL /LPF
HADV DNA SPEC NAA+PROBE: NOT DETECTED
HAPTOGLOB SERPL-MCNC: <10 MG/DL (ref 30–200)
HAV IGM SERPL QL IA: NORMAL
HBA1C MFR BLD: 6.2 % (ref 4.8–5.6)
HBV CORE IGM SERPL QL IA: NORMAL
HBV SURFACE AG SERPL QL IA: NORMAL
HCG SERPL QL: NEGATIVE
HCO3 BLDA-SCNC: 16.2 MMOL/L (ref 20–26)
HCO3 BLDA-SCNC: 19.7 MMOL/L (ref 20–26)
HCO3 BLDA-SCNC: 21.8 MMOL/L (ref 20–26)
HCO3 BLDA-SCNC: 22.7 MMOL/L (ref 20–26)
HCO3 BLDA-SCNC: 24 MMOL/L (ref 20–26)
HCO3 BLDA-SCNC: 25.7 MMOL/L (ref 20–26)
HCO3 BLDV-SCNC: 20.2 MMOL/L (ref 22–28)
HCO3 BLDV-SCNC: 26.3 MMOL/L (ref 22–28)
HCOV 229E RNA SPEC QL NAA+PROBE: NOT DETECTED
HCOV HKU1 RNA SPEC QL NAA+PROBE: NOT DETECTED
HCOV NL63 RNA SPEC QL NAA+PROBE: NOT DETECTED
HCOV OC43 RNA SPEC QL NAA+PROBE: NOT DETECTED
HCT VFR BLD AUTO: 25 % (ref 34–46.6)
HCT VFR BLD AUTO: 25 % (ref 34–46.6)
HCT VFR BLD AUTO: 25.4 % (ref 34–46.6)
HCT VFR BLD AUTO: 26.3 % (ref 34–46.6)
HCT VFR BLD AUTO: 28.3 % (ref 34–46.6)
HCT VFR BLD AUTO: 28.9 % (ref 34–46.6)
HCT VFR BLD AUTO: 28.9 % (ref 34–46.6)
HCT VFR BLD AUTO: 29.2 % (ref 34–46.6)
HCT VFR BLD AUTO: 29.5 % (ref 34–46.6)
HCT VFR BLD AUTO: 29.6 % (ref 34–46.6)
HCT VFR BLD AUTO: 29.7 % (ref 34–46.6)
HCT VFR BLD AUTO: 29.8 % (ref 34–46.6)
HCT VFR BLD AUTO: 29.9 % (ref 34–46.6)
HCT VFR BLD AUTO: 30.2 % (ref 34–46.6)
HCT VFR BLD AUTO: 31.1 % (ref 34–46.6)
HCT VFR BLD AUTO: 31.1 % (ref 34–46.6)
HCT VFR BLD AUTO: 31.5 % (ref 34–46.6)
HCT VFR BLD AUTO: 32.1 % (ref 34–46.6)
HCT VFR BLD AUTO: 33.6 % (ref 34–46.6)
HCT VFR BLD AUTO: 33.7 % (ref 34–46.6)
HCT VFR BLD AUTO: 34.7 % (ref 34–46.6)
HCT VFR BLD AUTO: 34.7 % (ref 34–46.6)
HCT VFR BLD CALC: 25.9 % (ref 38–51)
HCT VFR BLD CALC: 28 % (ref 38–51)
HCT VFR BLD CALC: 31.3 % (ref 38–51)
HCT VFR BLD CALC: 31.9 % (ref 38–51)
HCT VFR BLD CALC: 32.9 % (ref 38–51)
HCT VFR BLD CALC: 33.1 % (ref 38–51)
HCV AB SER DONR QL: NORMAL
HGB BLD-MCNC: 10 G/DL (ref 12–15.9)
HGB BLD-MCNC: 10.4 G/DL (ref 12–15.9)
HGB BLD-MCNC: 10.5 G/DL (ref 12–15.9)
HGB BLD-MCNC: 10.5 G/DL (ref 12–15.9)
HGB BLD-MCNC: 7.6 G/DL (ref 12–15.9)
HGB BLD-MCNC: 7.6 G/DL (ref 12–15.9)
HGB BLD-MCNC: 7.9 G/DL (ref 12–15.9)
HGB BLD-MCNC: 8 G/DL (ref 12–15.9)
HGB BLD-MCNC: 8.5 G/DL (ref 12–15.9)
HGB BLD-MCNC: 8.9 G/DL (ref 12–15.9)
HGB BLD-MCNC: 9 G/DL (ref 12–15.9)
HGB BLD-MCNC: 9.1 G/DL (ref 12–15.9)
HGB BLD-MCNC: 9.1 G/DL (ref 12–15.9)
HGB BLD-MCNC: 9.4 G/DL (ref 12–15.9)
HGB BLD-MCNC: 9.4 G/DL (ref 12–15.9)
HGB BLD-MCNC: 9.5 G/DL (ref 12–15.9)
HGB BLD-MCNC: 9.8 G/DL (ref 12–15.9)
HGB BLD-MCNC: 9.9 G/DL (ref 12–15.9)
HGB BLDA-MCNC: 10.2 G/DL (ref 13.5–17.5)
HGB BLDA-MCNC: 10.4 G/DL (ref 13.5–17.5)
HGB BLDA-MCNC: 10.4 G/DL (ref 13.5–17.5)
HGB BLDA-MCNC: 10.6 G/DL (ref 13.5–17.5)
HGB BLDA-MCNC: 10.7 G/DL (ref 13.5–17.5)
HGB BLDA-MCNC: 10.8 G/DL (ref 13.5–17.5)
HGB BLDA-MCNC: 8.5 G/DL (ref 13.5–17.5)
HGB BLDA-MCNC: 9.1 G/DL (ref 13.5–17.5)
HGB UR QL STRIP.AUTO: ABNORMAL
HMPV RNA NPH QL NAA+NON-PROBE: NOT DETECTED
HPIV1 RNA ISLT QL NAA+PROBE: NOT DETECTED
HPIV2 RNA SPEC QL NAA+PROBE: NOT DETECTED
HPIV3 RNA NPH QL NAA+PROBE: NOT DETECTED
HPIV4 P GENE NPH QL NAA+PROBE: NOT DETECTED
HYALINE CASTS UR QL AUTO: ABNORMAL /LPF
HYPOCHROMIA BLD QL: ABNORMAL
IMM GRANULOCYTES # BLD AUTO: 0.12 10*3/MM3 (ref 0–0.05)
IMM GRANULOCYTES # BLD AUTO: 0.24 10*3/MM3 (ref 0–0.05)
IMM GRANULOCYTES # BLD AUTO: 0.33 10*3/MM3 (ref 0–0.05)
IMM GRANULOCYTES # BLD AUTO: 0.38 10*3/MM3 (ref 0–0.05)
IMM GRANULOCYTES # BLD AUTO: 0.39 10*3/MM3 (ref 0–0.05)
IMM GRANULOCYTES NFR BLD AUTO: 0.6 % (ref 0–0.5)
IMM GRANULOCYTES NFR BLD AUTO: 0.9 % (ref 0–0.5)
IMM GRANULOCYTES NFR BLD AUTO: 1.1 % (ref 0–0.5)
IMM GRANULOCYTES NFR BLD AUTO: 1.2 % (ref 0–0.5)
IMM GRANULOCYTES NFR BLD AUTO: 1.4 % (ref 0–0.5)
INHALED O2 CONCENTRATION: 100 %
INHALED O2 CONCENTRATION: 28 %
INHALED O2 CONCENTRATION: 32 %
INR PPP: 1.42 (ref 0.9–1.1)
INR PPP: 1.66 (ref 0.9–1.1)
INR PPP: 2.05 (ref 0.9–1.1)
INR PPP: 2.25 (ref 0.9–1.1)
INR PPP: 2.42 (ref 0.9–1.1)
INR PPP: 2.56 (ref 0.9–1.1)
INR PPP: 2.68 (ref 0.9–1.1)
INR PPP: 2.7 (ref 0.9–1.1)
INR PPP: 2.8 (ref 0.9–1.1)
INR PPP: 2.84 (ref 0.9–1.1)
INR PPP: 2.85 (ref 0.9–1.1)
INR PPP: 3 (ref 0.9–1.1)
INR PPP: 3.02 (ref 0.9–1.1)
INR PPP: 3.18 (ref 0.9–1.1)
INR PPP: 3.21 (ref 0.9–1.1)
INR PPP: 3.27 (ref 0.9–1.1)
INR PPP: 3.37 (ref 0.9–1.1)
INR PPP: 3.47 (ref 0.9–1.1)
INR PPP: 3.48 (ref 0.9–1.1)
INR PPP: 3.62 (ref 0.9–1.1)
INR PPP: 3.86 (ref 0.9–1.1)
INR PPP: 4.51 (ref 0.9–1.1)
KETONES UR QL STRIP: NEGATIVE
L PNEUMO1 AG UR QL IA: NEGATIVE
LDH SERPL-CCNC: 668 U/L (ref 135–214)
LDH SERPL-CCNC: 677 U/L (ref 135–214)
LDH SERPL-CCNC: 688 U/L (ref 135–214)
LDH SERPL-CCNC: 699 U/L (ref 135–214)
LDH SERPL-CCNC: 710 U/L (ref 135–214)
LDH SERPL-CCNC: 715 U/L (ref 135–214)
LDH SERPL-CCNC: 731 U/L (ref 135–214)
LDH SERPL-CCNC: 741 U/L (ref 135–214)
LDH SERPL-CCNC: 761 U/L (ref 135–214)
LDH SERPL-CCNC: 770 U/L (ref 135–214)
LDH SERPL-CCNC: 771 U/L (ref 135–214)
LDH SERPL-CCNC: 780 U/L (ref 135–214)
LDH SERPL-CCNC: 790 U/L (ref 135–214)
LDH SERPL-CCNC: 851 U/L (ref 135–214)
LDH SERPL-CCNC: 858 U/L (ref 135–214)
LDH SERPL-CCNC: 873 U/L (ref 135–214)
LEUKOCYTE ESTERASE UR QL STRIP.AUTO: NEGATIVE
LIPASE SERPL-CCNC: 225 U/L (ref 13–60)
LIPASE SERPL-CCNC: 540 U/L (ref 13–60)
LYMPHOCYTES # BLD AUTO: 0.73 10*3/MM3 (ref 0.7–3.1)
LYMPHOCYTES # BLD AUTO: 0.76 10*3/MM3 (ref 0.7–3.1)
LYMPHOCYTES # BLD AUTO: 0.81 10*3/MM3 (ref 0.7–3.1)
LYMPHOCYTES # BLD AUTO: 1.24 10*3/MM3 (ref 0.7–3.1)
LYMPHOCYTES # BLD AUTO: 1.63 10*3/MM3 (ref 0.7–3.1)
LYMPHOCYTES # BLD MANUAL: 0.53 10*3/MM3 (ref 0.7–3.1)
LYMPHOCYTES # BLD MANUAL: 0.55 10*3/MM3 (ref 0.7–3.1)
LYMPHOCYTES # BLD MANUAL: 1.49 10*3/MM3 (ref 0.7–3.1)
LYMPHOCYTES # BLD MANUAL: 1.73 10*3/MM3 (ref 0.7–3.1)
LYMPHOCYTES # BLD MANUAL: 2.55 10*3/MM3 (ref 0.7–3.1)
LYMPHOCYTES NFR BLD AUTO: 2.6 % (ref 19.6–45.3)
LYMPHOCYTES NFR BLD AUTO: 2.9 % (ref 19.6–45.3)
LYMPHOCYTES NFR BLD AUTO: 3.9 % (ref 19.6–45.3)
LYMPHOCYTES NFR BLD AUTO: 4.1 % (ref 19.6–45.3)
LYMPHOCYTES NFR BLD AUTO: 5.5 % (ref 19.6–45.3)
LYMPHOCYTES NFR BLD MANUAL: 10 % (ref 5–12)
LYMPHOCYTES NFR BLD MANUAL: 3 % (ref 5–12)
LYMPHOCYTES NFR BLD MANUAL: 6 % (ref 5–12)
LYMPHOCYTES NFR BLD MANUAL: 7 % (ref 5–12)
LYMPHOCYTES NFR BLD MANUAL: 7 % (ref 5–12)
Lab: ABNORMAL
M PNEUMO IGG SER IA-ACNC: NOT DETECTED
MAGNESIUM SERPL-MCNC: 2.1 MG/DL (ref 1.6–2.6)
MAGNESIUM SERPL-MCNC: 2.5 MG/DL (ref 1.6–2.6)
MAGNESIUM SERPL-MCNC: 2.9 MG/DL (ref 1.6–2.6)
MAGNESIUM SERPL-MCNC: 3.1 MG/DL (ref 1.6–2.6)
MAXIMAL PREDICTED HEART RATE: 175 BPM
MAXIMAL PREDICTED HEART RATE: 175 BPM
MCH RBC QN AUTO: 23.7 PG (ref 26.6–33)
MCH RBC QN AUTO: 23.9 PG (ref 26.6–33)
MCH RBC QN AUTO: 24 PG (ref 26.6–33)
MCH RBC QN AUTO: 24 PG (ref 26.6–33)
MCH RBC QN AUTO: 24.1 PG (ref 26.6–33)
MCH RBC QN AUTO: 24.2 PG (ref 26.6–33)
MCH RBC QN AUTO: 24.2 PG (ref 26.6–33)
MCH RBC QN AUTO: 24.3 PG (ref 26.6–33)
MCH RBC QN AUTO: 24.4 PG (ref 26.6–33)
MCH RBC QN AUTO: 24.5 PG (ref 26.6–33)
MCH RBC QN AUTO: 24.6 PG (ref 26.6–33)
MCH RBC QN AUTO: 24.7 PG (ref 26.6–33)
MCHC RBC AUTO-ENTMCNC: 28.5 G/DL (ref 31.5–35.7)
MCHC RBC AUTO-ENTMCNC: 29.1 G/DL (ref 31.5–35.7)
MCHC RBC AUTO-ENTMCNC: 30 G/DL (ref 31.5–35.7)
MCHC RBC AUTO-ENTMCNC: 30.3 G/DL (ref 31.5–35.7)
MCHC RBC AUTO-ENTMCNC: 30.4 G/DL (ref 31.5–35.7)
MCHC RBC AUTO-ENTMCNC: 30.5 G/DL (ref 31.5–35.7)
MCHC RBC AUTO-ENTMCNC: 30.5 G/DL (ref 31.5–35.7)
MCHC RBC AUTO-ENTMCNC: 30.7 G/DL (ref 31.5–35.7)
MCHC RBC AUTO-ENTMCNC: 30.8 G/DL (ref 31.5–35.7)
MCHC RBC AUTO-ENTMCNC: 31.1 G/DL (ref 31.5–35.7)
MCHC RBC AUTO-ENTMCNC: 31.2 G/DL (ref 31.5–35.7)
MCHC RBC AUTO-ENTMCNC: 31.3 G/DL (ref 31.5–35.7)
MCHC RBC AUTO-ENTMCNC: 31.4 G/DL (ref 31.5–35.7)
MCHC RBC AUTO-ENTMCNC: 31.5 G/DL (ref 31.5–35.7)
MCHC RBC AUTO-ENTMCNC: 31.6 G/DL (ref 31.5–35.7)
MCV RBC AUTO: 75.9 FL (ref 79–97)
MCV RBC AUTO: 76.2 FL (ref 79–97)
MCV RBC AUTO: 76.3 FL (ref 79–97)
MCV RBC AUTO: 76.5 FL (ref 79–97)
MCV RBC AUTO: 76.7 FL (ref 79–97)
MCV RBC AUTO: 76.8 FL (ref 79–97)
MCV RBC AUTO: 77 FL (ref 79–97)
MCV RBC AUTO: 77.2 FL (ref 79–97)
MCV RBC AUTO: 77.5 FL (ref 79–97)
MCV RBC AUTO: 77.7 FL (ref 79–97)
MCV RBC AUTO: 78.1 FL (ref 79–97)
MCV RBC AUTO: 78.2 FL (ref 79–97)
MCV RBC AUTO: 78.5 FL (ref 79–97)
MCV RBC AUTO: 79.1 FL (ref 79–97)
MCV RBC AUTO: 79.1 FL (ref 79–97)
MCV RBC AUTO: 79.6 FL (ref 79–97)
MCV RBC AUTO: 79.8 FL (ref 79–97)
MCV RBC AUTO: 79.9 FL (ref 79–97)
MCV RBC AUTO: 81.6 FL (ref 79–97)
MCV RBC AUTO: 82.4 FL (ref 79–97)
MCV RBC AUTO: 84 FL (ref 79–97)
MCV RBC AUTO: 86.4 FL (ref 79–97)
METAMYELOCYTES NFR BLD MANUAL: 1 % (ref 0–0)
METAMYELOCYTES NFR BLD MANUAL: 2 % (ref 0–0)
METHADONE UR QL SCN: NEGATIVE
METHGB BLD QL: 0 % (ref 0–3)
METHGB BLD QL: 0.1 % (ref 0–3)
METHGB BLD QL: 0.2 % (ref 0–3)
METHGB BLD QL: <-0.1 % (ref 0–3)
MICROALBUMIN/CREAT UR: 280.2 MG/G
MICROCYTES BLD QL: ABNORMAL
MODALITY: ABNORMAL
MONOCYTES # BLD AUTO: 0.78 10*3/MM3 (ref 0.1–0.9)
MONOCYTES # BLD AUTO: 1.16 10*3/MM3 (ref 0.1–0.9)
MONOCYTES # BLD AUTO: 1.42 10*3/MM3 (ref 0.1–0.9)
MONOCYTES # BLD AUTO: 1.76 10*3/MM3 (ref 0.1–0.9)
MONOCYTES # BLD AUTO: 2.62 10*3/MM3 (ref 0.1–0.9)
MONOCYTES # BLD: 0.64 10*3/MM3 (ref 0.1–0.9)
MONOCYTES # BLD: 1.31 10*3/MM3 (ref 0.1–0.9)
MONOCYTES # BLD: 1.35 10*3/MM3 (ref 0.1–0.9)
MONOCYTES # BLD: 1.64 10*3/MM3 (ref 0.1–0.9)
MONOCYTES # BLD: 2.66 10*3/MM3 (ref 0.1–0.9)
MONOCYTES NFR BLD AUTO: 3.9 % (ref 5–12)
MONOCYTES NFR BLD AUTO: 4.4 % (ref 5–12)
MONOCYTES NFR BLD AUTO: 5.1 % (ref 5–12)
MONOCYTES NFR BLD AUTO: 5.6 % (ref 5–12)
MONOCYTES NFR BLD AUTO: 8.8 % (ref 5–12)
MRSA DNA SPEC QL NAA+PROBE: NEGATIVE
MYELOCYTES NFR BLD MANUAL: 1 % (ref 0–0)
MYELOCYTES NFR BLD MANUAL: 2 % (ref 0–0)
MYELOCYTES NFR BLD MANUAL: 2 % (ref 0–0)
NEUTROPHILS # BLD AUTO: 15.48 10*3/MM3 (ref 1.7–7)
NEUTROPHILS # BLD AUTO: 15.78 10*3/MM3 (ref 1.7–7)
NEUTROPHILS # BLD AUTO: 17.46 10*3/MM3 (ref 1.7–7)
NEUTROPHILS # BLD AUTO: 23.44 10*3/MM3 (ref 1.7–7)
NEUTROPHILS # BLD AUTO: 24.81 10*3/MM3 (ref 1.7–7)
NEUTROPHILS NFR BLD AUTO: 18.05 10*3/MM3 (ref 1.7–7)
NEUTROPHILS NFR BLD AUTO: 24.21 10*3/MM3 (ref 1.7–7)
NEUTROPHILS NFR BLD AUTO: 25.02 10*3/MM3 (ref 1.7–7)
NEUTROPHILS NFR BLD AUTO: 25.21 10*3/MM3 (ref 1.7–7)
NEUTROPHILS NFR BLD AUTO: 28.26 10*3/MM3 (ref 1.7–7)
NEUTROPHILS NFR BLD AUTO: 84.3 % (ref 42.7–76)
NEUTROPHILS NFR BLD AUTO: 89.1 % (ref 42.7–76)
NEUTROPHILS NFR BLD AUTO: 90.1 % (ref 42.7–76)
NEUTROPHILS NFR BLD AUTO: 91 % (ref 42.7–76)
NEUTROPHILS NFR BLD AUTO: 91.3 % (ref 42.7–76)
NEUTROPHILS NFR BLD MANUAL: 78 % (ref 42.7–76)
NEUTROPHILS NFR BLD MANUAL: 80 % (ref 42.7–76)
NEUTROPHILS NFR BLD MANUAL: 82 % (ref 42.7–76)
NEUTROPHILS NFR BLD MANUAL: 87 % (ref 42.7–76)
NEUTROPHILS NFR BLD MANUAL: 91 % (ref 42.7–76)
NEUTS BAND NFR BLD MANUAL: 1 % (ref 0–5)
NEUTS BAND NFR BLD MANUAL: 2 % (ref 0–5)
NEUTS BAND NFR BLD MANUAL: 5 % (ref 0–5)
NEUTS VAC BLD QL SMEAR: ABNORMAL
NITRITE UR QL STRIP: NEGATIVE
NOTE: ABNORMAL
NOTIFIED BY: ABNORMAL
NOTIFIED BY: ABNORMAL
NOTIFIED WHO: ABNORMAL
NRBC BLD AUTO-RTO: 0.1 /100 WBC (ref 0–0.2)
NRBC BLD AUTO-RTO: 0.2 /100 WBC (ref 0–0.2)
NRBC BLD AUTO-RTO: 0.7 /100 WBC (ref 0–0.2)
NRBC BLD AUTO-RTO: 1 /100 WBC (ref 0–0.2)
NRBC BLD AUTO-RTO: 1.9 /100 WBC (ref 0–0.2)
NRBC SPEC MANUAL: 12 /100 WBC (ref 0–0.2)
NRBC SPEC MANUAL: 3 /100 WBC (ref 0–0.2)
NRBC SPEC MANUAL: 4 /100 WBC (ref 0–0.2)
NRBC SPEC MANUAL: 6 /100 WBC (ref 0–0.2)
NRBC SPEC MANUAL: 6 /100 WBC (ref 0–0.2)
NT-PROBNP SERPL-MCNC: 4147 PG/ML (ref 0–450)
OPIATES UR QL: NEGATIVE
OXYCODONE UR QL SCN: NEGATIVE
OXYHGB MFR BLDV: 72.6 % (ref 45–75)
OXYHGB MFR BLDV: 85.2 % (ref 94–99)
OXYHGB MFR BLDV: 86.3 % (ref 94–99)
OXYHGB MFR BLDV: 88.2 % (ref 45–75)
OXYHGB MFR BLDV: 91.9 % (ref 94–99)
OXYHGB MFR BLDV: 93 % (ref 94–99)
OXYHGB MFR BLDV: 95.6 % (ref 94–99)
OXYHGB MFR BLDV: 96.1 % (ref 94–99)
PATH INTERP BLD-IMP: NORMAL
PCO2 BLDA: 33.3 MM HG (ref 35–45)
PCO2 BLDA: 37.3 MM HG (ref 35–45)
PCO2 BLDA: 38 MM HG (ref 35–45)
PCO2 BLDA: 42 MM HG (ref 35–45)
PCO2 BLDA: 42.8 MM HG (ref 35–45)
PCO2 BLDA: 43.8 MM HG (ref 35–45)
PCO2 BLDV: 39.4 MM HG (ref 41–51)
PCO2 BLDV: 46.8 MM HG (ref 41–51)
PCO2 TEMP ADJ BLD: ABNORMAL MM[HG]
PCP UR QL SCN: NEGATIVE
PEEP RESPIRATORY: 5 CM[H2O]
PF4 HEPARIN CMPLX IGG SERPL IA: 2.15 OD (ref 0–0.4)
PH BLDA: 7.19 PH UNITS (ref 7.35–7.45)
PH BLDA: 7.32 PH UNITS (ref 7.35–7.45)
PH BLDA: 7.33 PH UNITS (ref 7.35–7.45)
PH BLDA: 7.39 PH UNITS (ref 7.35–7.45)
PH BLDA: 7.41 PH UNITS (ref 7.35–7.45)
PH BLDA: 7.42 PH UNITS (ref 7.35–7.45)
PH BLDV: 7.32 PH UNITS (ref 7.32–7.42)
PH BLDV: 7.36 PH UNITS (ref 7.32–7.42)
PH UR STRIP.AUTO: 5.5 [PH] (ref 5–8)
PH, TEMP CORRECTED: ABNORMAL
PHOSPHATE SERPL-MCNC: 6.1 MG/DL (ref 2.5–4.5)
PHOSPHATE SERPL-MCNC: 6.5 MG/DL (ref 2.5–4.5)
PHOSPHATE SERPL-MCNC: 7 MG/DL (ref 2.5–4.5)
PHOSPHATE SERPL-MCNC: 7.2 MG/DL (ref 2.5–4.5)
PLAT MORPH BLD: NORMAL
PLATELET # BLD AUTO: 101 10*3/MM3 (ref 140–450)
PLATELET # BLD AUTO: 134 10*3/MM3 (ref 140–450)
PLATELET # BLD AUTO: 14 10*3/MM3 (ref 140–450)
PLATELET # BLD AUTO: 14 10*3/MM3 (ref 140–450)
PLATELET # BLD AUTO: 15 10*3/MM3 (ref 140–450)
PLATELET # BLD AUTO: 18 10*3/MM3 (ref 140–450)
PLATELET # BLD AUTO: 198 10*3/MM3 (ref 140–450)
PLATELET # BLD AUTO: 23 10*3/MM3 (ref 140–450)
PLATELET # BLD AUTO: 24 10*3/MM3 (ref 140–450)
PLATELET # BLD AUTO: 25 10*3/MM3 (ref 140–450)
PLATELET # BLD AUTO: 256 10*3/MM3 (ref 140–450)
PLATELET # BLD AUTO: 267 10*3/MM3 (ref 140–450)
PLATELET # BLD AUTO: 27 10*3/MM3 (ref 140–450)
PLATELET # BLD AUTO: 29 10*3/MM3 (ref 140–450)
PLATELET # BLD AUTO: 30 10*3/MM3 (ref 140–450)
PLATELET # BLD AUTO: 31 10*3/MM3 (ref 140–450)
PLATELET # BLD AUTO: 318 10*3/MM3 (ref 140–450)
PLATELET # BLD AUTO: 32 10*3/MM3 (ref 140–450)
PLATELET # BLD AUTO: 39 10*3/MM3 (ref 140–450)
PLATELET # BLD AUTO: 40 10*3/MM3 (ref 140–450)
PMV BLD AUTO: 10.6 FL (ref 6–12)
PMV BLD AUTO: 10.8 FL (ref 6–12)
PMV BLD AUTO: 11.2 FL (ref 6–12)
PMV BLD AUTO: 11.3 FL (ref 6–12)
PMV BLD AUTO: 11.4 FL (ref 6–12)
PMV BLD AUTO: ABNORMAL FL
PO2 BLDA: 57.1 MM HG (ref 83–108)
PO2 BLDA: 70.8 MM HG (ref 83–108)
PO2 BLDA: 74.6 MM HG (ref 83–108)
PO2 BLDA: 79 MM HG (ref 83–108)
PO2 BLDA: 88.4 MM HG (ref 83–108)
PO2 BLDA: 94.3 MM HG (ref 83–108)
PO2 BLDV: 46 MM HG (ref 27–53)
PO2 BLDV: 67.3 MM HG (ref 27–53)
PO2 TEMP ADJ BLD: ABNORMAL MM[HG]
POIKILOCYTOSIS BLD QL SMEAR: ABNORMAL
POLYCHROMASIA BLD QL SMEAR: ABNORMAL
POTASSIUM SERPL-SCNC: 4.3 MMOL/L (ref 3.5–5.2)
POTASSIUM SERPL-SCNC: 4.4 MMOL/L (ref 3.5–5.2)
POTASSIUM SERPL-SCNC: 4.9 MMOL/L (ref 3.5–5.2)
POTASSIUM SERPL-SCNC: 5.1 MMOL/L (ref 3.5–5.2)
POTASSIUM SERPL-SCNC: 5.2 MMOL/L (ref 3.5–5.2)
POTASSIUM SERPL-SCNC: 5.2 MMOL/L (ref 3.5–5.2)
POTASSIUM SERPL-SCNC: 5.3 MMOL/L (ref 3.5–5.2)
POTASSIUM SERPL-SCNC: 5.3 MMOL/L (ref 3.5–5.2)
POTASSIUM SERPL-SCNC: 5.4 MMOL/L (ref 3.5–5.2)
POTASSIUM SERPL-SCNC: 5.6 MMOL/L (ref 3.5–5.2)
POTASSIUM SERPL-SCNC: 5.7 MMOL/L (ref 3.5–5.2)
POTASSIUM SERPL-SCNC: 6 MMOL/L (ref 3.5–5.2)
POTASSIUM SERPL-SCNC: 6.1 MMOL/L (ref 3.5–5.2)
POTASSIUM SERPL-SCNC: 6.2 MMOL/L (ref 3.5–5.2)
POTASSIUM SERPL-SCNC: 6.5 MMOL/L (ref 3.5–5.2)
POTASSIUM SERPL-SCNC: 6.6 MMOL/L (ref 3.5–5.2)
PROPOXYPH UR QL: NEGATIVE
PROT ?TM UR-MCNC: 143 MG/DL
PROT ?TM UR-MCNC: 59 MG/DL
PROT SERPL-MCNC: 6.6 G/DL (ref 6–8.5)
PROT SERPL-MCNC: 6.7 G/DL (ref 6–8.5)
PROT SERPL-MCNC: 6.8 G/DL (ref 6–8.5)
PROT SERPL-MCNC: 7 G/DL (ref 6–8.5)
PROT SERPL-MCNC: 7.1 G/DL (ref 6–8.5)
PROT SERPL-MCNC: 7.2 G/DL (ref 6–8.5)
PROT SERPL-MCNC: 7.3 G/DL (ref 6–8.5)
PROT SERPL-MCNC: 7.4 G/DL (ref 6–8.5)
PROT SERPL-MCNC: 7.4 G/DL (ref 6–8.5)
PROT SERPL-MCNC: 7.5 G/DL (ref 6–8.5)
PROT SERPL-MCNC: 7.6 G/DL (ref 6–8.5)
PROT SERPL-MCNC: 8.1 G/DL (ref 6–8.5)
PROT UR QL STRIP: ABNORMAL
PROT/CREAT UR: 1318 MG/G CREA (ref 0–200)
PROT/CREAT UR: 857.6 MG/G CREA (ref 0–200)
PROTHROMBIN TIME: 17.8 SECONDS (ref 12.8–14.5)
PROTHROMBIN TIME: 20.1 SECONDS (ref 12.8–14.5)
PROTHROMBIN TIME: 23.5 SECONDS (ref 12.8–14.5)
PROTHROMBIN TIME: 25.2 SECONDS (ref 12.8–14.5)
PROTHROMBIN TIME: 26.7 SECONDS (ref 12.8–14.5)
PROTHROMBIN TIME: 27.9 SECONDS (ref 12.8–14.5)
PROTHROMBIN TIME: 28.9 SECONDS (ref 12.8–14.5)
PROTHROMBIN TIME: 29 SECONDS (ref 12.8–14.5)
PROTHROMBIN TIME: 29.9 SECONDS (ref 12.8–14.5)
PROTHROMBIN TIME: 30.2 SECONDS (ref 12.8–14.5)
PROTHROMBIN TIME: 30.3 SECONDS (ref 12.8–14.5)
PROTHROMBIN TIME: 31.5 SECONDS (ref 12.8–14.5)
PROTHROMBIN TIME: 31.6 SECONDS (ref 12.8–14.5)
PROTHROMBIN TIME: 32.9 SECONDS (ref 12.8–14.5)
PROTHROMBIN TIME: 33.1 SECONDS (ref 12.8–14.5)
PROTHROMBIN TIME: 33.6 SECONDS (ref 12.8–14.5)
PROTHROMBIN TIME: 34.4 SECONDS (ref 12.8–14.5)
PROTHROMBIN TIME: 35.2 SECONDS (ref 12.8–14.5)
PROTHROMBIN TIME: 35.2 SECONDS (ref 12.8–14.5)
PROTHROMBIN TIME: 36.3 SECONDS (ref 12.8–14.5)
PROTHROMBIN TIME: 38.1 SECONDS (ref 12.8–14.5)
PROTHROMBIN TIME: 42.9 SECONDS (ref 12.8–14.5)
QT INTERVAL: 296 MS
QT INTERVAL: 302 MS
QT INTERVAL: 304 MS
QT INTERVAL: 310 MS
QT INTERVAL: 318 MS
QT INTERVAL: 320 MS
QT INTERVAL: 320 MS
QT INTERVAL: 322 MS
QT INTERVAL: 326 MS
QT INTERVAL: 332 MS
QT INTERVAL: 358 MS
QT INTERVAL: 402 MS
QT INTERVAL: 436 MS
QTC INTERVAL: 306 MS
QTC INTERVAL: 384 MS
QTC INTERVAL: 408 MS
QTC INTERVAL: 432 MS
QTC INTERVAL: 436 MS
QTC INTERVAL: 439 MS
QTC INTERVAL: 443 MS
QTC INTERVAL: 449 MS
QTC INTERVAL: 450 MS
QTC INTERVAL: 453 MS
QTC INTERVAL: 453 MS
QTC INTERVAL: 458 MS
QTC INTERVAL: 473 MS
RBC # BLD AUTO: 3.14 10*6/MM3 (ref 3.77–5.28)
RBC # BLD AUTO: 3.16 10*6/MM3 (ref 3.77–5.28)
RBC # BLD AUTO: 3.25 10*6/MM3 (ref 3.77–5.28)
RBC # BLD AUTO: 3.29 10*6/MM3 (ref 3.77–5.28)
RBC # BLD AUTO: 3.45 10*6/MM3 (ref 3.77–5.28)
RBC # BLD AUTO: 3.69 10*6/MM3 (ref 3.77–5.28)
RBC # BLD AUTO: 3.71 10*6/MM3 (ref 3.77–5.28)
RBC # BLD AUTO: 3.73 10*6/MM3 (ref 3.77–5.28)
RBC # BLD AUTO: 3.76 10*6/MM3 (ref 3.77–5.28)
RBC # BLD AUTO: 3.76 10*6/MM3 (ref 3.77–5.28)
RBC # BLD AUTO: 3.79 10*6/MM3 (ref 3.77–5.28)
RBC # BLD AUTO: 3.9 10*6/MM3 (ref 3.77–5.28)
RBC # BLD AUTO: 3.91 10*6/MM3 (ref 3.77–5.28)
RBC # BLD AUTO: 3.91 10*6/MM3 (ref 3.77–5.28)
RBC # BLD AUTO: 4.01 10*6/MM3 (ref 3.77–5.28)
RBC # BLD AUTO: 4.04 10*6/MM3 (ref 3.77–5.28)
RBC # BLD AUTO: 4.05 10*6/MM3 (ref 3.77–5.28)
RBC # BLD AUTO: 4.11 10*6/MM3 (ref 3.77–5.28)
RBC # BLD AUTO: 4.15 10*6/MM3 (ref 3.77–5.28)
RBC # BLD AUTO: 4.21 10*6/MM3 (ref 3.77–5.28)
RBC # BLD AUTO: 4.25 10*6/MM3 (ref 3.77–5.28)
RBC # BLD AUTO: 4.25 10*6/MM3 (ref 3.77–5.28)
RBC # UR STRIP: ABNORMAL /HPF
REF LAB TEST METHOD: ABNORMAL
RETICS # AUTO: 0.13 10*6/MM3 (ref 0.02–0.13)
RETICS/RBC NFR AUTO: 3.38 % (ref 0.7–1.9)
RH BLD: POSITIVE
RHINOVIRUS RNA SPEC NAA+PROBE: NOT DETECTED
RSV RNA NPH QL NAA+NON-PROBE: NOT DETECTED
S AUREUS DNA SPEC QL NAA+PROBE: NEGATIVE
S PNEUM AG SPEC QL LA: NEGATIVE
SAO2 % BLDCOA: 88 % (ref 94–99)
SAO2 % BLDCOA: 88.9 % (ref 94–99)
SAO2 % BLDCOA: 92.9 % (ref 94–99)
SAO2 % BLDCOA: 94.5 % (ref 94–99)
SAO2 % BLDCOA: 97.8 % (ref 94–99)
SAO2 % BLDCOA: 97.8 % (ref 94–99)
SAO2 % BLDCOV: 74.1 % (ref 45–75)
SAO2 % BLDCOV: 89.9 % (ref 45–75)
SARS-COV-2 RNA RESP QL NAA+PROBE: NOT DETECTED
SCAN SLIDE: NORMAL
SCAN SLIDE: NORMAL
SCHISTOCYTES BLD QL SMEAR: ABNORMAL
SCHISTOCYTES BLD QL SMEAR: ABNORMAL
SET MECH RESP RATE: 24
SMALL PLATELETS BLD QL SMEAR: ABNORMAL
SODIUM SERPL-SCNC: 127 MMOL/L (ref 136–145)
SODIUM SERPL-SCNC: 127 MMOL/L (ref 136–145)
SODIUM SERPL-SCNC: 128 MMOL/L (ref 136–145)
SODIUM SERPL-SCNC: 128 MMOL/L (ref 136–145)
SODIUM SERPL-SCNC: 130 MMOL/L (ref 136–145)
SODIUM SERPL-SCNC: 131 MMOL/L (ref 136–145)
SODIUM SERPL-SCNC: 132 MMOL/L (ref 136–145)
SODIUM SERPL-SCNC: 132 MMOL/L (ref 136–145)
SODIUM SERPL-SCNC: 133 MMOL/L (ref 136–145)
SODIUM SERPL-SCNC: 134 MMOL/L (ref 136–145)
SODIUM SERPL-SCNC: 134 MMOL/L (ref 136–145)
SODIUM SERPL-SCNC: 135 MMOL/L (ref 136–145)
SODIUM SERPL-SCNC: 136 MMOL/L (ref 136–145)
SODIUM SERPL-SCNC: 138 MMOL/L (ref 136–145)
SODIUM SERPL-SCNC: 139 MMOL/L (ref 136–145)
SODIUM SERPL-SCNC: 142 MMOL/L (ref 136–145)
SODIUM SERPL-SCNC: 142 MMOL/L (ref 136–145)
SODIUM SERPL-SCNC: 143 MMOL/L (ref 136–145)
SODIUM SERPL-SCNC: 147 MMOL/L (ref 136–145)
SP GR UR STRIP: >1.03 (ref 1–1.03)
SQUAMOUS #/AREA URNS HPF: ABNORMAL /HPF
STRESS TARGET HR: 149 BPM
STRESS TARGET HR: 149 BPM
T&S EXPIRATION DATE: NORMAL
T&S EXPIRATION DATE: NORMAL
T4 FREE SERPL-MCNC: 0.81 NG/DL (ref 0.93–1.7)
TARGETS BLD QL SMEAR: ABNORMAL
TOXIC GRANULATION: ABNORMAL
TRICYCLICS UR QL SCN: NEGATIVE
TROPONIN T SERPL-MCNC: 0.01 NG/ML (ref 0–0.03)
TROPONIN T SERPL-MCNC: 0.02 NG/ML (ref 0–0.03)
TROPONIN T SERPL-MCNC: 0.02 NG/ML (ref 0–0.03)
TROPONIN T SERPL-MCNC: <0.01 NG/ML (ref 0–0.03)
TSH SERPL DL<=0.05 MIU/L-ACNC: 4.04 UIU/ML (ref 0.27–4.2)
UNIT  ABO: NORMAL
UNIT  RH: NORMAL
UROBILINOGEN UR QL STRIP: ABNORMAL
VARIANT LYMPHS NFR BLD MANUAL: 12 % (ref 19.6–45.3)
VARIANT LYMPHS NFR BLD MANUAL: 2 % (ref 19.6–45.3)
VARIANT LYMPHS NFR BLD MANUAL: 2 % (ref 19.6–45.3)
VARIANT LYMPHS NFR BLD MANUAL: 8 % (ref 19.6–45.3)
VARIANT LYMPHS NFR BLD MANUAL: 9 % (ref 19.6–45.3)
VENTILATOR MODE: ABNORMAL
VT ON VENT VENT: 400 ML
WBC # UR STRIP: ABNORMAL /HPF
WBC NRBC COR # BLD: 18.65 10*3/MM3 (ref 3.4–10.8)
WBC NRBC COR # BLD: 19.24 10*3/MM3 (ref 3.4–10.8)
WBC NRBC COR # BLD: 19.78 10*3/MM3 (ref 3.4–10.8)
WBC NRBC COR # BLD: 20.29 10*3/MM3 (ref 3.4–10.8)
WBC NRBC COR # BLD: 21.29 10*3/MM3 (ref 3.4–10.8)
WBC NRBC COR # BLD: 21.41 10*3/MM3 (ref 3.4–10.8)
WBC NRBC COR # BLD: 21.77 10*3/MM3 (ref 3.4–10.8)
WBC NRBC COR # BLD: 22.98 10*3/MM3 (ref 3.4–10.8)
WBC NRBC COR # BLD: 23.23 10*3/MM3 (ref 3.4–10.8)
WBC NRBC COR # BLD: 23.56 10*3/MM3 (ref 3.4–10.8)
WBC NRBC COR # BLD: 23.75 10*3/MM3 (ref 3.4–10.8)
WBC NRBC COR # BLD: 24.88 10*3/MM3 (ref 3.4–10.8)
WBC NRBC COR # BLD: 25.13 10*3/MM3 (ref 3.4–10.8)
WBC NRBC COR # BLD: 25.33 10*3/MM3 (ref 3.4–10.8)
WBC NRBC COR # BLD: 25.46 10*3/MM3 (ref 3.4–10.8)
WBC NRBC COR # BLD: 26.58 10*3/MM3 (ref 3.4–10.8)
WBC NRBC COR # BLD: 26.64 10*3/MM3 (ref 3.4–10.8)
WBC NRBC COR # BLD: 26.77 10*3/MM3 (ref 3.4–10.8)
WBC NRBC COR # BLD: 27.26 10*3/MM3 (ref 3.4–10.8)
WBC NRBC COR # BLD: 27.78 10*3/MM3 (ref 3.4–10.8)
WBC NRBC COR # BLD: 29.87 10*3/MM3 (ref 3.4–10.8)
WBC NRBC COR # BLD: 31.7 10*3/MM3 (ref 3.4–10.8)

## 2021-01-01 PROCEDURE — 71045 X-RAY EXAM CHEST 1 VIEW: CPT

## 2021-01-01 PROCEDURE — 85730 THROMBOPLASTIN TIME PARTIAL: CPT | Performed by: INTERNAL MEDICINE

## 2021-01-01 PROCEDURE — 99232 SBSQ HOSP IP/OBS MODERATE 35: CPT | Performed by: SPECIALIST

## 2021-01-01 PROCEDURE — 85384 FIBRINOGEN ACTIVITY: CPT | Performed by: INTERNAL MEDICINE

## 2021-01-01 PROCEDURE — 84156 ASSAY OF PROTEIN URINE: CPT | Performed by: INTERNAL MEDICINE

## 2021-01-01 PROCEDURE — 83050 HGB METHEMOGLOBIN QUAN: CPT

## 2021-01-01 PROCEDURE — 83615 LACTATE (LD) (LDH) ENZYME: CPT | Performed by: STUDENT IN AN ORGANIZED HEALTH CARE EDUCATION/TRAINING PROGRAM

## 2021-01-01 PROCEDURE — 99291 CRITICAL CARE FIRST HOUR: CPT | Performed by: STUDENT IN AN ORGANIZED HEALTH CARE EDUCATION/TRAINING PROGRAM

## 2021-01-01 PROCEDURE — 85007 BL SMEAR W/DIFF WBC COUNT: CPT | Performed by: INTERNAL MEDICINE

## 2021-01-01 PROCEDURE — P9047 ALBUMIN (HUMAN), 25%, 50ML: HCPCS | Performed by: INTERNAL MEDICINE

## 2021-01-01 PROCEDURE — 94799 UNLISTED PULMONARY SVC/PX: CPT

## 2021-01-01 PROCEDURE — 93005 ELECTROCARDIOGRAM TRACING: CPT | Performed by: STUDENT IN AN ORGANIZED HEALTH CARE EDUCATION/TRAINING PROGRAM

## 2021-01-01 PROCEDURE — 85060 BLOOD SMEAR INTERPRETATION: CPT | Performed by: INTERNAL MEDICINE

## 2021-01-01 PROCEDURE — 83605 ASSAY OF LACTIC ACID: CPT | Performed by: NURSE PRACTITIONER

## 2021-01-01 PROCEDURE — 99233 SBSQ HOSP IP/OBS HIGH 50: CPT | Performed by: NURSE PRACTITIONER

## 2021-01-01 PROCEDURE — 99284 EMERGENCY DEPT VISIT MOD MDM: CPT

## 2021-01-01 PROCEDURE — 82805 BLOOD GASES W/O2 SATURATION: CPT

## 2021-01-01 PROCEDURE — 85730 THROMBOPLASTIN TIME PARTIAL: CPT | Performed by: EMERGENCY MEDICINE

## 2021-01-01 PROCEDURE — 85730 THROMBOPLASTIN TIME PARTIAL: CPT | Performed by: STUDENT IN AN ORGANIZED HEALTH CARE EDUCATION/TRAINING PROGRAM

## 2021-01-01 PROCEDURE — 76705 ECHO EXAM OF ABDOMEN: CPT

## 2021-01-01 PROCEDURE — 99291 CRITICAL CARE FIRST HOUR: CPT | Performed by: INTERNAL MEDICINE

## 2021-01-01 PROCEDURE — 82550 ASSAY OF CK (CPK): CPT | Performed by: INTERNAL MEDICINE

## 2021-01-01 PROCEDURE — 82248 BILIRUBIN DIRECT: CPT | Performed by: STUDENT IN AN ORGANIZED HEALTH CARE EDUCATION/TRAINING PROGRAM

## 2021-01-01 PROCEDURE — 82375 ASSAY CARBOXYHB QUANT: CPT

## 2021-01-01 PROCEDURE — 83605 ASSAY OF LACTIC ACID: CPT | Performed by: EMERGENCY MEDICINE

## 2021-01-01 PROCEDURE — 25010000002 DOBUTAMINE PER 250 MG: Performed by: INTERNAL MEDICINE

## 2021-01-01 PROCEDURE — 83036 HEMOGLOBIN GLYCOSYLATED A1C: CPT | Performed by: NURSE PRACTITIONER

## 2021-01-01 PROCEDURE — 85730 THROMBOPLASTIN TIME PARTIAL: CPT | Performed by: HOSPITALIST

## 2021-01-01 PROCEDURE — 81001 URINALYSIS AUTO W/SCOPE: CPT | Performed by: EMERGENCY MEDICINE

## 2021-01-01 PROCEDURE — 85025 COMPLETE CBC W/AUTO DIFF WBC: CPT | Performed by: HOSPITALIST

## 2021-01-01 PROCEDURE — 99232 SBSQ HOSP IP/OBS MODERATE 35: CPT | Performed by: INTERNAL MEDICINE

## 2021-01-01 PROCEDURE — 82962 GLUCOSE BLOOD TEST: CPT

## 2021-01-01 PROCEDURE — 25010000002 METHYLPREDNISOLONE PER 40 MG: Performed by: INTERNAL MEDICINE

## 2021-01-01 PROCEDURE — 76775 US EXAM ABDO BACK WALL LIM: CPT

## 2021-01-01 PROCEDURE — 25010000002 METHYLPREDNISOLONE PER 40 MG: Performed by: STUDENT IN AN ORGANIZED HEALTH CARE EDUCATION/TRAINING PROGRAM

## 2021-01-01 PROCEDURE — 25010000002 THIAMINE PER 100 MG: Performed by: INTERNAL MEDICINE

## 2021-01-01 PROCEDURE — 85610 PROTHROMBIN TIME: CPT | Performed by: INTERNAL MEDICINE

## 2021-01-01 PROCEDURE — 80053 COMPREHEN METABOLIC PANEL: CPT | Performed by: PHYSICIAN ASSISTANT

## 2021-01-01 PROCEDURE — 84443 ASSAY THYROID STIM HORMONE: CPT | Performed by: INTERNAL MEDICINE

## 2021-01-01 PROCEDURE — 25010000002 INFLUENZA VAC SPLIT QUAD 0.5 ML SUSPENSION PREFILLED SYRINGE: Performed by: HOSPITALIST

## 2021-01-01 PROCEDURE — 63710000001 INSULIN REGULAR HUMAN PER 5 UNITS: Performed by: INTERNAL MEDICINE

## 2021-01-01 PROCEDURE — 84703 CHORIONIC GONADOTROPIN ASSAY: CPT | Performed by: EMERGENCY MEDICINE

## 2021-01-01 PROCEDURE — 74176 CT ABD & PELVIS W/O CONTRAST: CPT | Performed by: RADIOLOGY

## 2021-01-01 PROCEDURE — 94640 AIRWAY INHALATION TREATMENT: CPT

## 2021-01-01 PROCEDURE — 99254 IP/OBS CNSLTJ NEW/EST MOD 60: CPT | Performed by: INTERNAL MEDICINE

## 2021-01-01 PROCEDURE — 63710000001 INSULIN REGULAR HUMAN PER 5 UNITS: Performed by: STUDENT IN AN ORGANIZED HEALTH CARE EDUCATION/TRAINING PROGRAM

## 2021-01-01 PROCEDURE — 25010000002 FUROSEMIDE PER 20 MG: Performed by: INTERNAL MEDICINE

## 2021-01-01 PROCEDURE — 25010000002 ONDANSETRON PER 1 MG: Performed by: HOSPITALIST

## 2021-01-01 PROCEDURE — 0 IOVERSOL 68 % SOLUTION: Performed by: EMERGENCY MEDICINE

## 2021-01-01 PROCEDURE — 85027 COMPLETE CBC AUTOMATED: CPT | Performed by: STUDENT IN AN ORGANIZED HEALTH CARE EDUCATION/TRAINING PROGRAM

## 2021-01-01 PROCEDURE — 80053 COMPREHEN METABOLIC PANEL: CPT | Performed by: HOSPITALIST

## 2021-01-01 PROCEDURE — 82820 HEMOGLOBIN-OXYGEN AFFINITY: CPT

## 2021-01-01 PROCEDURE — 25010000002 ALBUMIN HUMAN 25% PER 50 ML: Performed by: INTERNAL MEDICINE

## 2021-01-01 PROCEDURE — 25010000002 HEPARIN (PORCINE) PER 1000 UNITS: Performed by: INTERNAL MEDICINE

## 2021-01-01 PROCEDURE — 25010000002 MORPHINE PER 10 MG: Performed by: STUDENT IN AN ORGANIZED HEALTH CARE EDUCATION/TRAINING PROGRAM

## 2021-01-01 PROCEDURE — 25010000002 METOCLOPRAMIDE PER 10 MG: Performed by: INTERNAL MEDICINE

## 2021-01-01 PROCEDURE — 25010000002 DEXAMETHASONE PER 1 MG: Performed by: STUDENT IN AN ORGANIZED HEALTH CARE EDUCATION/TRAINING PROGRAM

## 2021-01-01 PROCEDURE — 25010000002 PHENYLEPHRINE 10 MG/ML SOLUTION: Performed by: INTERNAL MEDICINE

## 2021-01-01 PROCEDURE — 25010000002 METHYLPREDNISOLONE PER 40 MG: Performed by: HOSPITALIST

## 2021-01-01 PROCEDURE — 85007 BL SMEAR W/DIFF WBC COUNT: CPT | Performed by: PHYSICIAN ASSISTANT

## 2021-01-01 PROCEDURE — 80074 ACUTE HEPATITIS PANEL: CPT | Performed by: NURSE PRACTITIONER

## 2021-01-01 PROCEDURE — 36600 WITHDRAWAL OF ARTERIAL BLOOD: CPT

## 2021-01-01 PROCEDURE — 85379 FIBRIN DEGRADATION QUANT: CPT | Performed by: STUDENT IN AN ORGANIZED HEALTH CARE EDUCATION/TRAINING PROGRAM

## 2021-01-01 PROCEDURE — 86927 PLASMA FRESH FROZEN: CPT

## 2021-01-01 PROCEDURE — 36430 TRANSFUSION BLD/BLD COMPNT: CPT

## 2021-01-01 PROCEDURE — 25010000002 CEFTRIAXONE PER 250 MG: Performed by: EMERGENCY MEDICINE

## 2021-01-01 PROCEDURE — 71045 X-RAY EXAM CHEST 1 VIEW: CPT | Performed by: RADIOLOGY

## 2021-01-01 PROCEDURE — 86900 BLOOD TYPING SEROLOGIC ABO: CPT

## 2021-01-01 PROCEDURE — 80143 DRUG ASSAY ACETAMINOPHEN: CPT | Performed by: INTERNAL MEDICINE

## 2021-01-01 PROCEDURE — 82570 ASSAY OF URINE CREATININE: CPT | Performed by: INTERNAL MEDICINE

## 2021-01-01 PROCEDURE — 80053 COMPREHEN METABOLIC PANEL: CPT | Performed by: EMERGENCY MEDICINE

## 2021-01-01 PROCEDURE — 85610 PROTHROMBIN TIME: CPT | Performed by: STUDENT IN AN ORGANIZED HEALTH CARE EDUCATION/TRAINING PROGRAM

## 2021-01-01 PROCEDURE — 85230 CLOT FACTOR VII PROCONVERTIN: CPT | Performed by: NURSE PRACTITIONER

## 2021-01-01 PROCEDURE — 93010 ELECTROCARDIOGRAM REPORT: CPT | Performed by: INTERNAL MEDICINE

## 2021-01-01 PROCEDURE — 85025 COMPLETE CBC W/AUTO DIFF WBC: CPT | Performed by: INTERNAL MEDICINE

## 2021-01-01 PROCEDURE — 93005 ELECTROCARDIOGRAM TRACING: CPT | Performed by: HOSPITALIST

## 2021-01-01 PROCEDURE — 84484 ASSAY OF TROPONIN QUANT: CPT | Performed by: STUDENT IN AN ORGANIZED HEALTH CARE EDUCATION/TRAINING PROGRAM

## 2021-01-01 PROCEDURE — 84484 ASSAY OF TROPONIN QUANT: CPT | Performed by: HOSPITALIST

## 2021-01-01 PROCEDURE — 25010000002 MEROPENEM PER 100 MG: Performed by: HOSPITALIST

## 2021-01-01 PROCEDURE — 80162 ASSAY OF DIGOXIN TOTAL: CPT | Performed by: INTERNAL MEDICINE

## 2021-01-01 PROCEDURE — 0 LIDOCAINE 1 % SOLUTION

## 2021-01-01 PROCEDURE — 99233 SBSQ HOSP IP/OBS HIGH 50: CPT | Performed by: INTERNAL MEDICINE

## 2021-01-01 PROCEDURE — 25010000002 PIPERACILLIN SOD-TAZOBACTAM PER 1 G: Performed by: EMERGENCY MEDICINE

## 2021-01-01 PROCEDURE — 80069 RENAL FUNCTION PANEL: CPT | Performed by: INTERNAL MEDICINE

## 2021-01-01 PROCEDURE — P9012 CRYOPRECIPITATE EACH UNIT: HCPCS

## 2021-01-01 PROCEDURE — 85045 AUTOMATED RETICULOCYTE COUNT: CPT | Performed by: STUDENT IN AN ORGANIZED HEALTH CARE EDUCATION/TRAINING PROGRAM

## 2021-01-01 PROCEDURE — 86140 C-REACTIVE PROTEIN: CPT | Performed by: INTERNAL MEDICINE

## 2021-01-01 PROCEDURE — 25010000002 PIPERACILLIN SOD-TAZOBACTAM PER 1 G: Performed by: INTERNAL MEDICINE

## 2021-01-01 PROCEDURE — 80053 COMPREHEN METABOLIC PANEL: CPT | Performed by: STUDENT IN AN ORGANIZED HEALTH CARE EDUCATION/TRAINING PROGRAM

## 2021-01-01 PROCEDURE — 25010000002 AMIODARONE IN DEXTROSE 5% 360-4.14 MG/200ML-% SOLUTION: Performed by: HOSPITALIST

## 2021-01-01 PROCEDURE — 25010000002 DOBUTAMINE PER 250 MG: Performed by: STUDENT IN AN ORGANIZED HEALTH CARE EDUCATION/TRAINING PROGRAM

## 2021-01-01 PROCEDURE — 85384 FIBRINOGEN ACTIVITY: CPT | Performed by: STUDENT IN AN ORGANIZED HEALTH CARE EDUCATION/TRAINING PROGRAM

## 2021-01-01 PROCEDURE — 85730 THROMBOPLASTIN TIME PARTIAL: CPT | Performed by: NURSE PRACTITIONER

## 2021-01-01 PROCEDURE — 93970 EXTREMITY STUDY: CPT

## 2021-01-01 PROCEDURE — 85610 PROTHROMBIN TIME: CPT | Performed by: NURSE PRACTITIONER

## 2021-01-01 PROCEDURE — 83735 ASSAY OF MAGNESIUM: CPT | Performed by: INTERNAL MEDICINE

## 2021-01-01 PROCEDURE — P9037 PLATE PHERES LEUKOREDU IRRAD: HCPCS

## 2021-01-01 PROCEDURE — 05HM33Z INSERTION OF INFUSION DEVICE INTO RIGHT INTERNAL JUGULAR VEIN, PERCUTANEOUS APPROACH: ICD-10-PCS | Performed by: SURGERY

## 2021-01-01 PROCEDURE — 25010000002 AMIODARONE PER 30 MG: Performed by: HOSPITALIST

## 2021-01-01 PROCEDURE — 94002 VENT MGMT INPAT INIT DAY: CPT

## 2021-01-01 PROCEDURE — 87641 MR-STAPH DNA AMP PROBE: CPT | Performed by: INTERNAL MEDICINE

## 2021-01-01 PROCEDURE — 85060 BLOOD SMEAR INTERPRETATION: CPT | Performed by: STUDENT IN AN ORGANIZED HEALTH CARE EDUCATION/TRAINING PROGRAM

## 2021-01-01 PROCEDURE — 76705 ECHO EXAM OF ABDOMEN: CPT | Performed by: RADIOLOGY

## 2021-01-01 PROCEDURE — 82247 BILIRUBIN TOTAL: CPT | Performed by: STUDENT IN AN ORGANIZED HEALTH CARE EDUCATION/TRAINING PROGRAM

## 2021-01-01 PROCEDURE — 80053 COMPREHEN METABOLIC PANEL: CPT | Performed by: INTERNAL MEDICINE

## 2021-01-01 PROCEDURE — 93306 TTE W/DOPPLER COMPLETE: CPT | Performed by: INTERNAL MEDICINE

## 2021-01-01 PROCEDURE — 86880 COOMBS TEST DIRECT: CPT | Performed by: STUDENT IN AN ORGANIZED HEALTH CARE EDUCATION/TRAINING PROGRAM

## 2021-01-01 PROCEDURE — 71275 CT ANGIOGRAPHY CHEST: CPT

## 2021-01-01 PROCEDURE — 85060 BLOOD SMEAR INTERPRETATION: CPT | Performed by: HOSPITALIST

## 2021-01-01 PROCEDURE — 93308 TTE F-UP OR LMTD: CPT | Performed by: INTERNAL MEDICINE

## 2021-01-01 PROCEDURE — 93308 TTE F-UP OR LMTD: CPT

## 2021-01-01 PROCEDURE — 25010000002 EPINEPHRINE 1 MG/ML SOLUTION

## 2021-01-01 PROCEDURE — 86140 C-REACTIVE PROTEIN: CPT | Performed by: EMERGENCY MEDICINE

## 2021-01-01 PROCEDURE — 87633 RESP VIRUS 12-25 TARGETS: CPT | Performed by: INTERNAL MEDICINE

## 2021-01-01 PROCEDURE — 83010 ASSAY OF HAPTOGLOBIN QUANT: CPT | Performed by: INTERNAL MEDICINE

## 2021-01-01 PROCEDURE — 86850 RBC ANTIBODY SCREEN: CPT | Performed by: STUDENT IN AN ORGANIZED HEALTH CARE EDUCATION/TRAINING PROGRAM

## 2021-01-01 PROCEDURE — 36556 INSERT NON-TUNNEL CV CATH: CPT | Performed by: SURGERY

## 2021-01-01 PROCEDURE — 80306 DRUG TEST PRSMV INSTRMNT: CPT | Performed by: EMERGENCY MEDICINE

## 2021-01-01 PROCEDURE — 87899 AGENT NOS ASSAY W/OPTIC: CPT | Performed by: INTERNAL MEDICINE

## 2021-01-01 PROCEDURE — 25010000002 DOBUTAMINE PER 250 MG: Performed by: NURSE PRACTITIONER

## 2021-01-01 PROCEDURE — 83690 ASSAY OF LIPASE: CPT | Performed by: INTERNAL MEDICINE

## 2021-01-01 PROCEDURE — 82150 ASSAY OF AMYLASE: CPT | Performed by: INTERNAL MEDICINE

## 2021-01-01 PROCEDURE — 90686 IIV4 VACC NO PRSV 0.5 ML IM: CPT | Performed by: HOSPITALIST

## 2021-01-01 PROCEDURE — 99233 SBSQ HOSP IP/OBS HIGH 50: CPT | Performed by: STUDENT IN AN ORGANIZED HEALTH CARE EDUCATION/TRAINING PROGRAM

## 2021-01-01 PROCEDURE — 25010000002 DIGOXIN PER 500 MCG: Performed by: HOSPITALIST

## 2021-01-01 PROCEDURE — 85220 BLOOC CLOT FACTOR V TEST: CPT | Performed by: NURSE PRACTITIONER

## 2021-01-01 PROCEDURE — 86901 BLOOD TYPING SEROLOGIC RH(D): CPT

## 2021-01-01 PROCEDURE — 93005 ELECTROCARDIOGRAM TRACING: CPT | Performed by: INTERNAL MEDICINE

## 2021-01-01 PROCEDURE — 93005 ELECTROCARDIOGRAM TRACING: CPT | Performed by: EMERGENCY MEDICINE

## 2021-01-01 PROCEDURE — 25010000002 HEPARIN (PORCINE) PER 1000 UNITS: Performed by: EMERGENCY MEDICINE

## 2021-01-01 PROCEDURE — 99239 HOSP IP/OBS DSCHRG MGMT >30: CPT | Performed by: STUDENT IN AN ORGANIZED HEALTH CARE EDUCATION/TRAINING PROGRAM

## 2021-01-01 PROCEDURE — 82140 ASSAY OF AMMONIA: CPT | Performed by: INTERNAL MEDICINE

## 2021-01-01 PROCEDURE — 82043 UR ALBUMIN QUANTITATIVE: CPT | Performed by: INTERNAL MEDICINE

## 2021-01-01 PROCEDURE — 86022 PLATELET ANTIBODIES: CPT | Performed by: NURSE PRACTITIONER

## 2021-01-01 PROCEDURE — 93010 ELECTROCARDIOGRAM REPORT: CPT | Performed by: SPECIALIST

## 2021-01-01 PROCEDURE — 83605 ASSAY OF LACTIC ACID: CPT | Performed by: INTERNAL MEDICINE

## 2021-01-01 PROCEDURE — 84439 ASSAY OF FREE THYROXINE: CPT | Performed by: INTERNAL MEDICINE

## 2021-01-01 PROCEDURE — 99222 1ST HOSP IP/OBS MODERATE 55: CPT | Performed by: NURSE PRACTITIONER

## 2021-01-01 PROCEDURE — 83880 ASSAY OF NATRIURETIC PEPTIDE: CPT | Performed by: EMERGENCY MEDICINE

## 2021-01-01 PROCEDURE — 93306 TTE W/DOPPLER COMPLETE: CPT

## 2021-01-01 PROCEDURE — 85610 PROTHROMBIN TIME: CPT | Performed by: EMERGENCY MEDICINE

## 2021-01-01 PROCEDURE — 85025 COMPLETE CBC W/AUTO DIFF WBC: CPT | Performed by: PHYSICIAN ASSISTANT

## 2021-01-01 PROCEDURE — 80053 COMPREHEN METABOLIC PANEL: CPT | Performed by: NURSE PRACTITIONER

## 2021-01-01 PROCEDURE — 25010000002 DOPAMINE PER 40 MG: Performed by: INTERNAL MEDICINE

## 2021-01-01 PROCEDURE — 86900 BLOOD TYPING SEROLOGIC ABO: CPT | Performed by: INTERNAL MEDICINE

## 2021-01-01 PROCEDURE — 84484 ASSAY OF TROPONIN QUANT: CPT | Performed by: EMERGENCY MEDICINE

## 2021-01-01 PROCEDURE — 92610 EVALUATE SWALLOWING FUNCTION: CPT

## 2021-01-01 PROCEDURE — 25010000002 MEROPENEM PER 100 MG: Performed by: INTERNAL MEDICINE

## 2021-01-01 PROCEDURE — 92950 HEART/LUNG RESUSCITATION CPR: CPT

## 2021-01-01 PROCEDURE — G0008 ADMIN INFLUENZA VIRUS VAC: HCPCS | Performed by: HOSPITALIST

## 2021-01-01 PROCEDURE — 87799 DETECT AGENT NOS DNA QUANT: CPT | Performed by: INTERNAL MEDICINE

## 2021-01-01 PROCEDURE — 25010000002 EPINEPHRINE 1 MG/10ML SOLUTION PREFILLED SYRINGE: Performed by: EMERGENCY MEDICINE

## 2021-01-01 PROCEDURE — 87640 STAPH A DNA AMP PROBE: CPT | Performed by: INTERNAL MEDICINE

## 2021-01-01 PROCEDURE — 99285 EMERGENCY DEPT VISIT HI MDM: CPT

## 2021-01-01 PROCEDURE — 25010000002 EPINEPHRINE 1 MG/10ML SOLUTION PREFILLED SYRINGE: Performed by: STUDENT IN AN ORGANIZED HEALTH CARE EDUCATION/TRAINING PROGRAM

## 2021-01-01 PROCEDURE — 83735 ASSAY OF MAGNESIUM: CPT | Performed by: STUDENT IN AN ORGANIZED HEALTH CARE EDUCATION/TRAINING PROGRAM

## 2021-01-01 PROCEDURE — 25010000002 ONDANSETRON PER 1 MG: Performed by: INTERNAL MEDICINE

## 2021-01-01 PROCEDURE — 86140 C-REACTIVE PROTEIN: CPT | Performed by: NURSE PRACTITIONER

## 2021-01-01 PROCEDURE — 86901 BLOOD TYPING SEROLOGIC RH(D): CPT | Performed by: STUDENT IN AN ORGANIZED HEALTH CARE EDUCATION/TRAINING PROGRAM

## 2021-01-01 PROCEDURE — 25010000002 ATROPINE PER 0.01 MG: Performed by: EMERGENCY MEDICINE

## 2021-01-01 PROCEDURE — 85007 BL SMEAR W/DIFF WBC COUNT: CPT | Performed by: HOSPITALIST

## 2021-01-01 PROCEDURE — 85240 CLOT FACTOR VIII AHG 1 STAGE: CPT | Performed by: NURSE PRACTITIONER

## 2021-01-01 PROCEDURE — P9100 PATHOGEN TEST FOR PLATELETS: HCPCS

## 2021-01-01 PROCEDURE — 86901 BLOOD TYPING SEROLOGIC RH(D): CPT | Performed by: INTERNAL MEDICINE

## 2021-01-01 PROCEDURE — 25010000002 CALCIUM GLUCONATE-NACL 1-0.675 GM/50ML-% SOLUTION: Performed by: INTERNAL MEDICINE

## 2021-01-01 PROCEDURE — 99255 IP/OBS CONSLTJ NEW/EST HI 80: CPT | Performed by: INTERNAL MEDICINE

## 2021-01-01 PROCEDURE — 87636 SARSCOV2 & INF A&B AMP PRB: CPT | Performed by: EMERGENCY MEDICINE

## 2021-01-01 PROCEDURE — 83735 ASSAY OF MAGNESIUM: CPT | Performed by: NURSE PRACTITIONER

## 2021-01-01 PROCEDURE — 25010000002 CALCIUM GLUCONATE PER 10 ML: Performed by: INTERNAL MEDICINE

## 2021-01-01 PROCEDURE — 86850 RBC ANTIBODY SCREEN: CPT | Performed by: INTERNAL MEDICINE

## 2021-01-01 PROCEDURE — 74176 CT ABD & PELVIS W/O CONTRAST: CPT

## 2021-01-01 PROCEDURE — 85025 COMPLETE CBC W/AUTO DIFF WBC: CPT | Performed by: EMERGENCY MEDICINE

## 2021-01-01 PROCEDURE — 36415 COLL VENOUS BLD VENIPUNCTURE: CPT

## 2021-01-01 PROCEDURE — 86900 BLOOD TYPING SEROLOGIC ABO: CPT | Performed by: STUDENT IN AN ORGANIZED HEALTH CARE EDUCATION/TRAINING PROGRAM

## 2021-01-01 PROCEDURE — 85025 COMPLETE CBC W/AUTO DIFF WBC: CPT | Performed by: STUDENT IN AN ORGANIZED HEALTH CARE EDUCATION/TRAINING PROGRAM

## 2021-01-01 PROCEDURE — 85025 COMPLETE CBC W/AUTO DIFF WBC: CPT | Performed by: NURSE PRACTITIONER

## 2021-01-01 PROCEDURE — 87040 BLOOD CULTURE FOR BACTERIA: CPT | Performed by: EMERGENCY MEDICINE

## 2021-01-01 RX ORDER — SODIUM POLYSTYRENE SULFONATE 4.1 MEQ/G
30 POWDER, FOR SUSPENSION ORAL; RECTAL ONCE
Status: DISCONTINUED | OUTPATIENT
Start: 2021-01-01 | End: 2021-01-01 | Stop reason: HOSPADM

## 2021-01-01 RX ORDER — MONTELUKAST SODIUM 10 MG/1
10 TABLET ORAL NIGHTLY
Status: DISCONTINUED | OUTPATIENT
Start: 2021-01-01 | End: 2021-01-01

## 2021-01-01 RX ORDER — DOPAMINE HYDROCHLORIDE 160 MG/100ML
4 INJECTION, SOLUTION INTRAVENOUS
Status: DISCONTINUED | OUTPATIENT
Start: 2021-01-01 | End: 2021-01-01

## 2021-01-01 RX ORDER — METOPROLOL TARTRATE 5 MG/5ML
INJECTION INTRAVENOUS
Status: COMPLETED
Start: 2021-01-01 | End: 2021-01-01

## 2021-01-01 RX ORDER — FUROSEMIDE 10 MG/ML
60 INJECTION INTRAMUSCULAR; INTRAVENOUS ONCE
Status: COMPLETED | OUTPATIENT
Start: 2021-01-01 | End: 2021-01-01

## 2021-01-01 RX ORDER — NICOTINE POLACRILEX 4 MG
15 LOZENGE BUCCAL
Status: DISCONTINUED | OUTPATIENT
Start: 2021-01-01 | End: 2021-01-01

## 2021-01-01 RX ORDER — LIDOCAINE HYDROCHLORIDE 10 MG/ML
INJECTION, SOLUTION INFILTRATION; PERINEURAL
Status: COMPLETED
Start: 2021-01-01 | End: 2021-01-01

## 2021-01-01 RX ORDER — NOREPINEPHRINE BIT/0.9 % NACL 8 MG/250ML
.02-.3 INFUSION BOTTLE (ML) INTRAVENOUS
Status: DISCONTINUED | OUTPATIENT
Start: 2021-01-01 | End: 2021-01-01

## 2021-01-01 RX ORDER — DEXTROSE MONOHYDRATE 25 G/50ML
50 INJECTION, SOLUTION INTRAVENOUS ONCE
Status: COMPLETED | OUTPATIENT
Start: 2021-01-01 | End: 2021-01-01

## 2021-01-01 RX ORDER — SODIUM CHLORIDE 0.9 % (FLUSH) 0.9 %
10 SYRINGE (ML) INJECTION EVERY 12 HOURS SCHEDULED
Status: DISCONTINUED | OUTPATIENT
Start: 2021-01-01 | End: 2021-01-01 | Stop reason: HOSPADM

## 2021-01-01 RX ORDER — CALCIUM CARBONATE 200(500)MG
1 TABLET,CHEWABLE ORAL 3 TIMES DAILY PRN
Status: DISCONTINUED | OUTPATIENT
Start: 2021-01-01 | End: 2021-01-01 | Stop reason: HOSPADM

## 2021-01-01 RX ORDER — ALBUMIN (HUMAN) 12.5 G/50ML
25 SOLUTION INTRAVENOUS 3 TIMES DAILY
Status: DISCONTINUED | OUTPATIENT
Start: 2021-01-01 | End: 2021-01-01 | Stop reason: HOSPADM

## 2021-01-01 RX ORDER — FUROSEMIDE 10 MG/ML
80 INJECTION INTRAMUSCULAR; INTRAVENOUS EVERY 12 HOURS
Status: DISCONTINUED | OUTPATIENT
Start: 2021-01-01 | End: 2021-01-01

## 2021-01-01 RX ORDER — BUDESONIDE AND FORMOTEROL FUMARATE DIHYDRATE 160; 4.5 UG/1; UG/1
2 AEROSOL RESPIRATORY (INHALATION) 2 TIMES DAILY
COMMUNITY

## 2021-01-01 RX ORDER — ALBUMIN (HUMAN) 12.5 G/50ML
25 SOLUTION INTRAVENOUS ONCE
Status: COMPLETED | OUTPATIENT
Start: 2021-01-01 | End: 2021-01-01

## 2021-01-01 RX ORDER — DEXTROSE MONOHYDRATE 25 G/50ML
INJECTION, SOLUTION INTRAVENOUS
Status: COMPLETED
Start: 2021-01-01 | End: 2021-01-01

## 2021-01-01 RX ORDER — BISACODYL 10 MG
10 SUPPOSITORY, RECTAL RECTAL ONCE
Status: COMPLETED | OUTPATIENT
Start: 2021-01-01 | End: 2021-01-01

## 2021-01-01 RX ORDER — SODIUM CHLORIDE 0.9 % (FLUSH) 0.9 %
10 SYRINGE (ML) INJECTION AS NEEDED
Status: DISCONTINUED | OUTPATIENT
Start: 2021-01-01 | End: 2021-01-01 | Stop reason: HOSPADM

## 2021-01-01 RX ORDER — FUROSEMIDE 10 MG/ML
20 INJECTION INTRAMUSCULAR; INTRAVENOUS DAILY
Status: DISCONTINUED | OUTPATIENT
Start: 2021-01-01 | End: 2021-01-01

## 2021-01-01 RX ORDER — BUDESONIDE AND FORMOTEROL FUMARATE DIHYDRATE 160; 4.5 UG/1; UG/1
2 AEROSOL RESPIRATORY (INHALATION)
Status: DISCONTINUED | OUTPATIENT
Start: 2021-01-01 | End: 2021-01-01 | Stop reason: HOSPADM

## 2021-01-01 RX ORDER — ACETAMINOPHEN 500 MG
1000 TABLET ORAL ONCE
Status: COMPLETED | OUTPATIENT
Start: 2021-01-01 | End: 2021-01-01

## 2021-01-01 RX ORDER — METOPROLOL TARTRATE 5 MG/5ML
5 INJECTION INTRAVENOUS ONCE
Status: COMPLETED | OUTPATIENT
Start: 2021-01-01 | End: 2021-01-01

## 2021-01-01 RX ORDER — THIAMINE HYDROCHLORIDE 100 MG/ML
100 INJECTION, SOLUTION INTRAMUSCULAR; INTRAVENOUS DAILY
Status: COMPLETED | OUTPATIENT
Start: 2021-01-01 | End: 2021-01-01

## 2021-01-01 RX ORDER — LACTULOSE 10 G/15ML
30 SOLUTION ORAL 3 TIMES DAILY
Status: DISCONTINUED | OUTPATIENT
Start: 2021-01-01 | End: 2021-01-01 | Stop reason: HOSPADM

## 2021-01-01 RX ORDER — METHYLPREDNISOLONE SODIUM SUCCINATE 40 MG/ML
20 INJECTION, POWDER, LYOPHILIZED, FOR SOLUTION INTRAMUSCULAR; INTRAVENOUS EVERY 24 HOURS
Status: DISCONTINUED | OUTPATIENT
Start: 2021-01-01 | End: 2021-01-01

## 2021-01-01 RX ORDER — SODIUM POLYSTYRENE SULFONATE 4.1 MEQ/G
30 POWDER, FOR SUSPENSION ORAL; RECTAL ONCE
Status: COMPLETED | OUTPATIENT
Start: 2021-01-01 | End: 2021-01-01

## 2021-01-01 RX ORDER — LACTULOSE 10 G/15ML
20 SOLUTION ORAL ONCE
Status: DISCONTINUED | OUTPATIENT
Start: 2021-01-01 | End: 2021-01-01 | Stop reason: HOSPADM

## 2021-01-01 RX ORDER — NICOTINE POLACRILEX 4 MG
15 LOZENGE BUCCAL
Status: DISCONTINUED | OUTPATIENT
Start: 2021-01-01 | End: 2021-01-01 | Stop reason: HOSPADM

## 2021-01-01 RX ORDER — DILTIAZEM HYDROCHLORIDE 5 MG/ML
5 INJECTION INTRAVENOUS ONCE
Status: COMPLETED | OUTPATIENT
Start: 2021-01-01 | End: 2021-01-01

## 2021-01-01 RX ORDER — LACTULOSE 10 G/15ML
300 SOLUTION ORAL ONCE
Status: COMPLETED | OUTPATIENT
Start: 2021-01-01 | End: 2021-01-01

## 2021-01-01 RX ORDER — ATROPINE SULFATE 1 MG/ML
INJECTION, SOLUTION INTRAMUSCULAR; INTRAVENOUS; SUBCUTANEOUS
Status: COMPLETED | OUTPATIENT
Start: 2021-01-01 | End: 2021-01-01

## 2021-01-01 RX ORDER — PANTOPRAZOLE SODIUM 40 MG/1
40 TABLET, DELAYED RELEASE ORAL
Status: DISCONTINUED | OUTPATIENT
Start: 2021-01-01 | End: 2021-01-01 | Stop reason: HOSPADM

## 2021-01-01 RX ORDER — DOBUTAMINE HYDROCHLORIDE 200 MG/100ML
2-20 INJECTION INTRAVENOUS
Status: DISCONTINUED | OUTPATIENT
Start: 2021-01-01 | End: 2021-01-01

## 2021-01-01 RX ORDER — DEXTROSE MONOHYDRATE 25 G/50ML
50 INJECTION, SOLUTION INTRAVENOUS ONCE
Status: DISCONTINUED | OUTPATIENT
Start: 2021-01-01 | End: 2021-01-01 | Stop reason: HOSPADM

## 2021-01-01 RX ORDER — METOCLOPRAMIDE HYDROCHLORIDE 5 MG/ML
5 INJECTION INTRAMUSCULAR; INTRAVENOUS ONCE
Status: COMPLETED | OUTPATIENT
Start: 2021-01-01 | End: 2021-01-01

## 2021-01-01 RX ORDER — METOPROLOL TARTRATE 50 MG/1
50 TABLET, FILM COATED ORAL EVERY 12 HOURS SCHEDULED
Status: DISCONTINUED | OUTPATIENT
Start: 2021-01-01 | End: 2021-01-01

## 2021-01-01 RX ORDER — DEXAMETHASONE SODIUM PHOSPHATE 4 MG/ML
6 INJECTION, SOLUTION INTRA-ARTICULAR; INTRALESIONAL; INTRAMUSCULAR; INTRAVENOUS; SOFT TISSUE ONCE
Status: COMPLETED | OUTPATIENT
Start: 2021-01-01 | End: 2021-01-01

## 2021-01-01 RX ORDER — NOREPINEPHRINE BIT/0.9 % NACL 8 MG/250ML
.02-.3 INFUSION BOTTLE (ML) INTRAVENOUS
Status: DISCONTINUED | OUTPATIENT
Start: 2021-01-01 | End: 2021-01-01 | Stop reason: HOSPADM

## 2021-01-01 RX ORDER — DOBUTAMINE HYDROCHLORIDE 200 MG/100ML
5 INJECTION INTRAVENOUS
Status: DISCONTINUED | OUTPATIENT
Start: 2021-01-01 | End: 2021-01-01 | Stop reason: HOSPADM

## 2021-01-01 RX ORDER — ONDANSETRON 2 MG/ML
4 INJECTION INTRAMUSCULAR; INTRAVENOUS EVERY 6 HOURS PRN
Status: DISCONTINUED | OUTPATIENT
Start: 2021-01-01 | End: 2021-01-01 | Stop reason: HOSPADM

## 2021-01-01 RX ORDER — PHENYLEPHRINE HCL IN 0.9% NACL 0.5 MG/5ML
.5-3 SYRINGE (ML) INTRAVENOUS
Status: DISCONTINUED | OUTPATIENT
Start: 2021-01-01 | End: 2021-01-01 | Stop reason: HOSPADM

## 2021-01-01 RX ORDER — DEXTROSE MONOHYDRATE 25 G/50ML
25 INJECTION, SOLUTION INTRAVENOUS
Status: DISCONTINUED | OUTPATIENT
Start: 2021-01-01 | End: 2021-01-01 | Stop reason: HOSPADM

## 2021-01-01 RX ORDER — LACTULOSE 10 G/15ML
30 SOLUTION ORAL DAILY
Status: DISCONTINUED | OUTPATIENT
Start: 2021-01-01 | End: 2021-01-01

## 2021-01-01 RX ORDER — LACTULOSE 10 G/15ML
30 SOLUTION ORAL 2 TIMES DAILY
Status: DISCONTINUED | OUTPATIENT
Start: 2021-01-01 | End: 2021-01-01

## 2021-01-01 RX ORDER — LACTULOSE 10 G/15ML
90 SOLUTION ORAL DAILY
Status: DISCONTINUED | OUTPATIENT
Start: 2021-01-01 | End: 2021-01-01

## 2021-01-01 RX ORDER — DOBUTAMINE HYDROCHLORIDE 200 MG/100ML
3 INJECTION INTRAVENOUS
Status: DISCONTINUED | OUTPATIENT
Start: 2021-01-01 | End: 2021-01-01

## 2021-01-01 RX ORDER — FUROSEMIDE 10 MG/ML
20 INJECTION INTRAMUSCULAR; INTRAVENOUS ONCE
Status: COMPLETED | OUTPATIENT
Start: 2021-01-01 | End: 2021-01-01

## 2021-01-01 RX ORDER — DEXTROSE MONOHYDRATE 25 G/50ML
25 INJECTION, SOLUTION INTRAVENOUS
Status: DISCONTINUED | OUTPATIENT
Start: 2021-01-01 | End: 2021-01-01

## 2021-01-01 RX ORDER — FUROSEMIDE 10 MG/ML
80 INJECTION INTRAMUSCULAR; INTRAVENOUS EVERY 12 HOURS
Status: COMPLETED | OUTPATIENT
Start: 2021-01-01 | End: 2021-01-01

## 2021-01-01 RX ORDER — EPINEPHRINE 0.1 MG/ML
SYRINGE (ML) INJECTION
Status: COMPLETED | OUTPATIENT
Start: 2021-01-01 | End: 2021-01-01

## 2021-01-01 RX ORDER — DIGOXIN 0.25 MG/ML
250 INJECTION INTRAMUSCULAR; INTRAVENOUS ONCE
Status: COMPLETED | OUTPATIENT
Start: 2021-01-01 | End: 2021-01-01

## 2021-01-01 RX ORDER — CALCIUM CHLORIDE 100 MG/ML
INJECTION INTRAVENOUS; INTRAVENTRICULAR
Status: COMPLETED | OUTPATIENT
Start: 2021-01-01 | End: 2021-01-01

## 2021-01-01 RX ORDER — HEPARIN SODIUM 5000 [USP'U]/ML
2500 INJECTION, SOLUTION INTRAVENOUS; SUBCUTANEOUS AS NEEDED
Status: DISCONTINUED | OUTPATIENT
Start: 2021-01-01 | End: 2021-01-01

## 2021-01-01 RX ORDER — METHYLPREDNISOLONE SODIUM SUCCINATE 40 MG/ML
40 INJECTION, POWDER, LYOPHILIZED, FOR SOLUTION INTRAMUSCULAR; INTRAVENOUS EVERY 12 HOURS
Status: DISCONTINUED | OUTPATIENT
Start: 2021-01-01 | End: 2021-01-01

## 2021-01-01 RX ORDER — MORPHINE SULFATE 2 MG/ML
1 INJECTION, SOLUTION INTRAMUSCULAR; INTRAVENOUS EVERY 4 HOURS PRN
Status: DISCONTINUED | OUTPATIENT
Start: 2021-01-01 | End: 2021-01-01 | Stop reason: HOSPADM

## 2021-01-01 RX ORDER — HEPARIN SODIUM 5000 [USP'U]/ML
5000 INJECTION, SOLUTION INTRAVENOUS; SUBCUTANEOUS ONCE
Status: COMPLETED | OUTPATIENT
Start: 2021-01-01 | End: 2021-01-01

## 2021-01-01 RX ORDER — MONTELUKAST SODIUM 10 MG/1
10 TABLET ORAL NIGHTLY
COMMUNITY

## 2021-01-01 RX ORDER — ALBUTEROL SULFATE 2.5 MG/3ML
2.5 SOLUTION RESPIRATORY (INHALATION) EVERY 4 HOURS PRN
Status: DISCONTINUED | OUTPATIENT
Start: 2021-01-01 | End: 2021-01-01

## 2021-01-01 RX ORDER — HEPARIN SODIUM 5000 [USP'U]/ML
5000 INJECTION, SOLUTION INTRAVENOUS; SUBCUTANEOUS AS NEEDED
Status: DISCONTINUED | OUTPATIENT
Start: 2021-01-01 | End: 2021-01-01

## 2021-01-01 RX ORDER — IPRATROPIUM BROMIDE AND ALBUTEROL SULFATE 2.5; .5 MG/3ML; MG/3ML
3 SOLUTION RESPIRATORY (INHALATION) EVERY 6 HOURS PRN
Status: DISCONTINUED | OUTPATIENT
Start: 2021-01-01 | End: 2021-01-01 | Stop reason: HOSPADM

## 2021-01-01 RX ORDER — SODIUM CHLORIDE 9 MG/ML
75 INJECTION, SOLUTION INTRAVENOUS CONTINUOUS
Status: DISCONTINUED | OUTPATIENT
Start: 2021-01-01 | End: 2021-01-01

## 2021-01-01 RX ORDER — CALCIUM GLUCONATE 20 MG/ML
1 INJECTION, SOLUTION INTRAVENOUS ONCE
Status: COMPLETED | OUTPATIENT
Start: 2021-01-01 | End: 2021-01-01

## 2021-01-01 RX ORDER — METHYLPREDNISOLONE SODIUM SUCCINATE 40 MG/ML
20 INJECTION, POWDER, LYOPHILIZED, FOR SOLUTION INTRAMUSCULAR; INTRAVENOUS EVERY 12 HOURS
Status: DISCONTINUED | OUTPATIENT
Start: 2021-01-01 | End: 2021-01-01

## 2021-01-01 RX ORDER — HEPARIN SODIUM 10000 [USP'U]/100ML
7.87 INJECTION, SOLUTION INTRAVENOUS
Status: DISCONTINUED | OUTPATIENT
Start: 2021-01-01 | End: 2021-01-01

## 2021-01-01 RX ORDER — METHYLPREDNISOLONE SODIUM SUCCINATE 40 MG/ML
40 INJECTION, POWDER, LYOPHILIZED, FOR SOLUTION INTRAMUSCULAR; INTRAVENOUS EVERY 24 HOURS
Status: DISCONTINUED | OUTPATIENT
Start: 2021-01-01 | End: 2021-01-01

## 2021-01-01 RX ORDER — FUROSEMIDE 10 MG/ML
60 INJECTION INTRAMUSCULAR; INTRAVENOUS 3 TIMES DAILY
Status: DISCONTINUED | OUTPATIENT
Start: 2021-01-01 | End: 2021-01-01

## 2021-01-01 RX ADMIN — HEPARIN SODIUM 7.87 UNITS/KG/HR: 5000 INJECTION INTRAVENOUS; SUBCUTANEOUS at 04:51

## 2021-01-01 RX ADMIN — IOVERSOL 70 ML: 678 INJECTION INTRA-ARTERIAL; INTRAVENOUS at 03:57

## 2021-01-01 RX ADMIN — HEPARIN SODIUM 2500 UNITS: 5000 INJECTION INTRAVENOUS; SUBCUTANEOUS at 13:32

## 2021-01-01 RX ADMIN — LACTULOSE 30 G: 20 SOLUTION ORAL at 17:27

## 2021-01-01 RX ADMIN — METOPROLOL TARTRATE 5 MG: 5 INJECTION INTRAVENOUS at 21:13

## 2021-01-01 RX ADMIN — LACTULOSE 90 G: 10 SOLUTION ORAL at 14:00

## 2021-01-01 RX ADMIN — SODIUM CHLORIDE, PRESERVATIVE FREE 10 ML: 5 INJECTION INTRAVENOUS at 08:57

## 2021-01-01 RX ADMIN — HEPARIN SODIUM 22.87 UNITS/KG/HR: 5000 INJECTION INTRAVENOUS; SUBCUTANEOUS at 07:55

## 2021-01-01 RX ADMIN — ONDANSETRON 4 MG: 2 INJECTION INTRAMUSCULAR; INTRAVENOUS at 03:32

## 2021-01-01 RX ADMIN — METOPROLOL TARTRATE 25 MG: 25 TABLET, FILM COATED ORAL at 21:21

## 2021-01-01 RX ADMIN — SODIUM CHLORIDE, PRESERVATIVE FREE 10 ML: 5 INJECTION INTRAVENOUS at 09:02

## 2021-01-01 RX ADMIN — IPRATROPIUM BROMIDE 0.5 MG: 0.5 SOLUTION RESPIRATORY (INHALATION) at 00:22

## 2021-01-01 RX ADMIN — EPINEPHRINE 1 MG: 0.1 INJECTION INTRACARDIAC; INTRAVENOUS at 08:05

## 2021-01-01 RX ADMIN — METOPROLOL TARTRATE 25 MG: 25 TABLET, FILM COATED ORAL at 09:41

## 2021-01-01 RX ADMIN — THIAMINE HYDROCHLORIDE 100 MG: 100 INJECTION, SOLUTION INTRAMUSCULAR; INTRAVENOUS at 10:48

## 2021-01-01 RX ADMIN — DEXTROSE MONOHYDRATE 50 ML: 25 INJECTION, SOLUTION INTRAVENOUS at 09:04

## 2021-01-01 RX ADMIN — MORPHINE SULFATE 1 MG: 2 INJECTION, SOLUTION INTRAMUSCULAR; INTRAVENOUS at 04:14

## 2021-01-01 RX ADMIN — HEPARIN SODIUM 5000 UNITS: 5000 INJECTION INTRAVENOUS; SUBCUTANEOUS at 16:58

## 2021-01-01 RX ADMIN — SODIUM CHLORIDE, PRESERVATIVE FREE 10 ML: 5 INJECTION INTRAVENOUS at 20:37

## 2021-01-01 RX ADMIN — SODIUM CHLORIDE, PRESERVATIVE FREE 10 ML: 5 INJECTION INTRAVENOUS at 08:58

## 2021-01-01 RX ADMIN — ALBUMIN HUMAN 25 G: 0.25 SOLUTION INTRAVENOUS at 09:02

## 2021-01-01 RX ADMIN — SODIUM BICARBONATE 50 MEQ: 84 INJECTION INTRAVENOUS at 13:57

## 2021-01-01 RX ADMIN — SODIUM CHLORIDE, PRESERVATIVE FREE 10 ML: 5 INJECTION INTRAVENOUS at 21:21

## 2021-01-01 RX ADMIN — ATROPINE SULFATE 1 MG: 1 INJECTION, SOLUTION INTRAMUSCULAR; INTRAVENOUS; SUBCUTANEOUS at 06:10

## 2021-01-01 RX ADMIN — DOBUTAMINE HYDROCHLORIDE 5 MCG/KG/MIN: 200 INJECTION INTRAVENOUS at 08:36

## 2021-01-01 RX ADMIN — HEPARIN SODIUM 5000 UNITS: 5000 INJECTION INTRAVENOUS; SUBCUTANEOUS at 01:54

## 2021-01-01 RX ADMIN — SODIUM CHLORIDE, PRESERVATIVE FREE 10 ML: 5 INJECTION INTRAVENOUS at 20:29

## 2021-01-01 RX ADMIN — METOPROLOL TARTRATE 12.5 MG: 25 TABLET, FILM COATED ORAL at 13:11

## 2021-01-01 RX ADMIN — ALBUMIN HUMAN 25 G: 0.25 SOLUTION INTRAVENOUS at 21:21

## 2021-01-01 RX ADMIN — SODIUM ZIRCONIUM CYCLOSILICATE 10 G: 10 POWDER, FOR SUSPENSION ORAL at 09:48

## 2021-01-01 RX ADMIN — IPRATROPIUM BROMIDE 0.5 MG: 0.5 SOLUTION RESPIRATORY (INHALATION) at 06:27

## 2021-01-01 RX ADMIN — ALBUMIN HUMAN 25 G: 0.25 SOLUTION INTRAVENOUS at 08:54

## 2021-01-01 RX ADMIN — FUROSEMIDE 80 MG: 10 INJECTION, SOLUTION INTRAMUSCULAR; INTRAVENOUS at 06:26

## 2021-01-01 RX ADMIN — NYSTATIN 500000 UNITS: 100000 SUSPENSION ORAL at 18:14

## 2021-01-01 RX ADMIN — FUROSEMIDE 40 MG: 10 INJECTION, SOLUTION INTRAMUSCULAR; INTRAVENOUS at 17:51

## 2021-01-01 RX ADMIN — PANTOPRAZOLE SODIUM 40 MG: 40 TABLET, DELAYED RELEASE ORAL at 06:34

## 2021-01-01 RX ADMIN — CALCIUM CARBONATE 1 TABLET: 500 TABLET, CHEWABLE ORAL at 13:37

## 2021-01-01 RX ADMIN — BUDESONIDE AND FORMOTEROL FUMARATE DIHYDRATE 2 PUFF: 160; 4.5 AEROSOL RESPIRATORY (INHALATION) at 06:55

## 2021-01-01 RX ADMIN — SODIUM CHLORIDE, PRESERVATIVE FREE 10 ML: 5 INJECTION INTRAVENOUS at 21:22

## 2021-01-01 RX ADMIN — MORPHINE SULFATE 1 MG: 2 INJECTION, SOLUTION INTRAMUSCULAR; INTRAVENOUS at 20:31

## 2021-01-01 RX ADMIN — ALBUMIN HUMAN 25 G: 0.25 SOLUTION INTRAVENOUS at 22:31

## 2021-01-01 RX ADMIN — DOBUTAMINE HYDROCHLORIDE 2.5 MCG/KG/MIN: 200 INJECTION INTRAVENOUS at 23:44

## 2021-01-01 RX ADMIN — IPRATROPIUM BROMIDE 0.5 MG: 0.5 SOLUTION RESPIRATORY (INHALATION) at 19:25

## 2021-01-01 RX ADMIN — ALBUMIN HUMAN 25 G: 0.25 SOLUTION INTRAVENOUS at 16:44

## 2021-01-01 RX ADMIN — BUDESONIDE AND FORMOTEROL FUMARATE DIHYDRATE 2 PUFF: 160; 4.5 AEROSOL RESPIRATORY (INHALATION) at 07:06

## 2021-01-01 RX ADMIN — ALBUMIN HUMAN 25 G: 0.25 SOLUTION INTRAVENOUS at 09:18

## 2021-01-01 RX ADMIN — LACTULOSE 30 G: 20 SOLUTION ORAL at 16:27

## 2021-01-01 RX ADMIN — ONDANSETRON 4 MG: 2 INJECTION INTRAMUSCULAR; INTRAVENOUS at 06:26

## 2021-01-01 RX ADMIN — PIPERACILLIN SODIUM AND TAZOBACTAM SODIUM 4.5 G: 4; .5 INJECTION, POWDER, LYOPHILIZED, FOR SOLUTION INTRAVENOUS at 05:26

## 2021-01-01 RX ADMIN — LACTULOSE 30 G: 20 SOLUTION ORAL at 08:41

## 2021-01-01 RX ADMIN — SODIUM CHLORIDE, PRESERVATIVE FREE 10 ML: 5 INJECTION INTRAVENOUS at 08:43

## 2021-01-01 RX ADMIN — ONDANSETRON 4 MG: 2 INJECTION INTRAMUSCULAR; INTRAVENOUS at 23:40

## 2021-01-01 RX ADMIN — SODIUM CHLORIDE, PRESERVATIVE FREE 10 ML: 5 INJECTION INTRAVENOUS at 09:39

## 2021-01-01 RX ADMIN — ONDANSETRON 4 MG: 2 INJECTION INTRAMUSCULAR; INTRAVENOUS at 11:51

## 2021-01-01 RX ADMIN — DEXTROSE MONOHYDRATE 50 ML: 25 INJECTION, SOLUTION INTRAVENOUS at 11:51

## 2021-01-01 RX ADMIN — HUMAN INSULIN 10 UNITS: 100 INJECTION, SOLUTION SUBCUTANEOUS at 09:05

## 2021-01-01 RX ADMIN — PIPERACILLIN SODIUM AND TAZOBACTAM SODIUM 3.38 G: 3; .375 INJECTION, POWDER, LYOPHILIZED, FOR SOLUTION INTRAVENOUS at 15:53

## 2021-01-01 RX ADMIN — HEPARIN SODIUM 15.8 UNITS/KG/HR: 5000 INJECTION INTRAVENOUS; SUBCUTANEOUS at 22:04

## 2021-01-01 RX ADMIN — AMIODARONE HYDROCHLORIDE 150 MG: 50 INJECTION, SOLUTION INTRAVENOUS at 21:41

## 2021-01-01 RX ADMIN — HEPARIN SODIUM 25.87 UNITS/KG/HR: 5000 INJECTION INTRAVENOUS; SUBCUTANEOUS at 20:35

## 2021-01-01 RX ADMIN — EPINEPHRINE 1 MG: 0.1 INJECTION INTRACARDIAC; INTRAVENOUS at 06:03

## 2021-01-01 RX ADMIN — MORPHINE SULFATE 1 MG: 2 INJECTION, SOLUTION INTRAMUSCULAR; INTRAVENOUS at 05:07

## 2021-01-01 RX ADMIN — METOPROLOL TARTRATE 5 MG: 5 INJECTION INTRAVENOUS at 05:43

## 2021-01-01 RX ADMIN — ALBUTEROL SULFATE 5 MG: 2.5 SOLUTION RESPIRATORY (INHALATION) at 12:24

## 2021-01-01 RX ADMIN — AMIODARONE HYDROCHLORIDE 1 MG/MIN: 1.8 INJECTION, SOLUTION INTRAVENOUS at 22:08

## 2021-01-01 RX ADMIN — METOCLOPRAMIDE 5 MG: 5 INJECTION, SOLUTION INTRAMUSCULAR; INTRAVENOUS at 23:44

## 2021-01-01 RX ADMIN — SODIUM CHLORIDE, PRESERVATIVE FREE 10 ML: 5 INJECTION INTRAVENOUS at 22:31

## 2021-01-01 RX ADMIN — NOREPINEPHRINE BITARTRATE 0.02 MCG/KG/MIN: 1 INJECTION, SOLUTION, CONCENTRATE INTRAVENOUS at 06:37

## 2021-01-01 RX ADMIN — SODIUM CHLORIDE, PRESERVATIVE FREE 10 ML: 5 INJECTION INTRAVENOUS at 09:19

## 2021-01-01 RX ADMIN — CALCIUM CHLORIDE 1 G: 100 INJECTION, SOLUTION INTRAVENOUS at 08:05

## 2021-01-01 RX ADMIN — BUDESONIDE AND FORMOTEROL FUMARATE DIHYDRATE 2 PUFF: 160; 4.5 AEROSOL RESPIRATORY (INHALATION) at 11:23

## 2021-01-01 RX ADMIN — SODIUM ZIRCONIUM CYCLOSILICATE 10 G: 10 POWDER, FOR SUSPENSION ORAL at 11:50

## 2021-01-01 RX ADMIN — SODIUM CHLORIDE 5 MG/HR: 900 INJECTION, SOLUTION INTRAVENOUS at 16:37

## 2021-01-01 RX ADMIN — LACTULOSE 30 G: 20 SOLUTION ORAL at 19:26

## 2021-01-01 RX ADMIN — LIDOCAINE HYDROCHLORIDE: 10 INJECTION, SOLUTION INFILTRATION; PERINEURAL at 15:35

## 2021-01-01 RX ADMIN — PANTOPRAZOLE SODIUM 40 MG: 40 TABLET, DELAYED RELEASE ORAL at 06:27

## 2021-01-01 RX ADMIN — METHYLPREDNISOLONE SODIUM SUCCINATE 20 MG: 40 INJECTION, POWDER, FOR SOLUTION INTRAMUSCULAR; INTRAVENOUS at 09:01

## 2021-01-01 RX ADMIN — IPRATROPIUM BROMIDE 0.5 MG: 0.5 SOLUTION RESPIRATORY (INHALATION) at 06:33

## 2021-01-01 RX ADMIN — METHYLPREDNISOLONE SODIUM SUCCINATE 20 MG: 40 INJECTION, POWDER, FOR SOLUTION INTRAMUSCULAR; INTRAVENOUS at 08:41

## 2021-01-01 RX ADMIN — DOBUTAMINE HYDROCHLORIDE 2.5 MCG/KG/MIN: 200 INJECTION INTRAVENOUS at 03:50

## 2021-01-01 RX ADMIN — HEPARIN SODIUM 2500 UNITS: 5000 INJECTION INTRAVENOUS; SUBCUTANEOUS at 05:42

## 2021-01-01 RX ADMIN — SODIUM ZIRCONIUM CYCLOSILICATE 10 G: 10 POWDER, FOR SUSPENSION ORAL at 20:37

## 2021-01-01 RX ADMIN — HUMAN INSULIN 10 UNITS: 100 INJECTION, SOLUTION SUBCUTANEOUS at 18:27

## 2021-01-01 RX ADMIN — IPRATROPIUM BROMIDE 0.5 MG: 0.5 SOLUTION RESPIRATORY (INHALATION) at 19:36

## 2021-01-01 RX ADMIN — NYSTATIN 500000 UNITS: 100000 SUSPENSION ORAL at 18:23

## 2021-01-01 RX ADMIN — LACTULOSE 30 G: 20 SOLUTION ORAL at 16:50

## 2021-01-01 RX ADMIN — SODIUM CHLORIDE, PRESERVATIVE FREE 10 ML: 5 INJECTION INTRAVENOUS at 20:32

## 2021-01-01 RX ADMIN — MONTELUKAST SODIUM 10 MG: 10 TABLET, COATED ORAL at 20:08

## 2021-01-01 RX ADMIN — ALBUTEROL SULFATE 2.5 MG: 2.5 SOLUTION RESPIRATORY (INHALATION) at 19:12

## 2021-01-01 RX ADMIN — SODIUM CHLORIDE, PRESERVATIVE FREE 10 ML: 5 INJECTION INTRAVENOUS at 08:44

## 2021-01-01 RX ADMIN — CALCIUM GLUCONATE 1 G: 20 INJECTION, SOLUTION INTRAVENOUS at 02:00

## 2021-01-01 RX ADMIN — CALCIUM CARBONATE 1 TABLET: 500 TABLET, CHEWABLE ORAL at 21:46

## 2021-01-01 RX ADMIN — LACTULOSE 90 G: 10 SOLUTION ORAL at 09:44

## 2021-01-01 RX ADMIN — EPINEPHRINE 1 MG: 0.1 INJECTION INTRACARDIAC; INTRAVENOUS at 06:06

## 2021-01-01 RX ADMIN — MEROPENEM 1 G: 1 INJECTION, POWDER, FOR SOLUTION INTRAVENOUS at 23:54

## 2021-01-01 RX ADMIN — SODIUM CHLORIDE, PRESERVATIVE FREE 10 ML: 5 INJECTION INTRAVENOUS at 08:41

## 2021-01-01 RX ADMIN — HEPARIN SODIUM 10.87 UNITS/KG/HR: 5000 INJECTION INTRAVENOUS; SUBCUTANEOUS at 04:08

## 2021-01-01 RX ADMIN — LACTULOSE 30 G: 20 SOLUTION ORAL at 09:38

## 2021-01-01 RX ADMIN — SODIUM CHLORIDE, PRESERVATIVE FREE 10 ML: 5 INJECTION INTRAVENOUS at 21:18

## 2021-01-01 RX ADMIN — BUDESONIDE AND FORMOTEROL FUMARATE DIHYDRATE 2 PUFF: 160; 4.5 AEROSOL RESPIRATORY (INHALATION) at 19:28

## 2021-01-01 RX ADMIN — ACETAMINOPHEN 1000 MG: 500 TABLET ORAL at 11:35

## 2021-01-01 RX ADMIN — LACTULOSE 30 G: 20 SOLUTION ORAL at 08:38

## 2021-01-01 RX ADMIN — ONDANSETRON 4 MG: 2 INJECTION INTRAMUSCULAR; INTRAVENOUS at 03:24

## 2021-01-01 RX ADMIN — DOXYCYCLINE 100 MG: 100 INJECTION, POWDER, LYOPHILIZED, FOR SOLUTION INTRAVENOUS at 22:34

## 2021-01-01 RX ADMIN — SODIUM ZIRCONIUM CYCLOSILICATE 10 G: 10 POWDER, FOR SUSPENSION ORAL at 11:47

## 2021-01-01 RX ADMIN — SODIUM CHLORIDE 1000 ML: 9 INJECTION, SOLUTION INTRAVENOUS at 05:26

## 2021-01-01 RX ADMIN — MORPHINE SULFATE 1 MG: 2 INJECTION, SOLUTION INTRAMUSCULAR; INTRAVENOUS at 15:53

## 2021-01-01 RX ADMIN — SODIUM CHLORIDE 2 G: 9 INJECTION, SOLUTION INTRAVENOUS at 04:14

## 2021-01-01 RX ADMIN — FUROSEMIDE 20 MG: 10 INJECTION, SOLUTION INTRAMUSCULAR; INTRAVENOUS at 11:32

## 2021-01-01 RX ADMIN — DIGOXIN 250 MCG: 250 INJECTION, SOLUTION INTRAMUSCULAR; INTRAVENOUS; PARENTERAL at 21:25

## 2021-01-01 RX ADMIN — NOREPINEPHRINE BITARTRATE 0.02 MCG/KG/MIN: 1 INJECTION, SOLUTION, CONCENTRATE INTRAVENOUS at 16:35

## 2021-01-01 RX ADMIN — HEPARIN SODIUM 10.87 UNITS/KG/HR: 5000 INJECTION INTRAVENOUS; SUBCUTANEOUS at 07:33

## 2021-01-01 RX ADMIN — METHYLPREDNISOLONE SODIUM SUCCINATE 20 MG: 40 INJECTION, POWDER, FOR SOLUTION INTRAMUSCULAR; INTRAVENOUS at 09:39

## 2021-01-01 RX ADMIN — MEROPENEM 1 G: 1 INJECTION, POWDER, FOR SOLUTION INTRAVENOUS at 11:46

## 2021-01-01 RX ADMIN — EPINEPHRINE 1 MG: 0.1 INJECTION INTRACARDIAC; INTRAVENOUS at 07:59

## 2021-01-01 RX ADMIN — HUMAN INSULIN 10 UNITS: 100 INJECTION, SOLUTION SUBCUTANEOUS at 14:04

## 2021-01-01 RX ADMIN — HUMAN INSULIN 5 UNITS: 100 INJECTION, SOLUTION SUBCUTANEOUS at 01:51

## 2021-01-01 RX ADMIN — SODIUM ZIRCONIUM CYCLOSILICATE 10 G: 10 POWDER, FOR SUSPENSION ORAL at 06:26

## 2021-01-01 RX ADMIN — METHYLPREDNISOLONE SODIUM SUCCINATE 40 MG: 40 INJECTION, POWDER, FOR SOLUTION INTRAMUSCULAR; INTRAVENOUS at 09:58

## 2021-01-01 RX ADMIN — PIPERACILLIN SODIUM AND TAZOBACTAM SODIUM 3.38 G: 3; .375 INJECTION, POWDER, LYOPHILIZED, FOR SOLUTION INTRAVENOUS at 22:38

## 2021-01-01 RX ADMIN — ALBUMIN HUMAN 25 G: 0.25 SOLUTION INTRAVENOUS at 10:57

## 2021-01-01 RX ADMIN — MORPHINE SULFATE 1 MG: 2 INJECTION, SOLUTION INTRAMUSCULAR; INTRAVENOUS at 01:05

## 2021-01-01 RX ADMIN — METOPROLOL TARTRATE 50 MG: 50 TABLET, FILM COATED ORAL at 09:30

## 2021-01-01 RX ADMIN — HUMAN INSULIN 10 UNITS: 100 INJECTION, SOLUTION SUBCUTANEOUS at 11:50

## 2021-01-01 RX ADMIN — IPRATROPIUM BROMIDE 0.5 MG: 0.5 SOLUTION RESPIRATORY (INHALATION) at 07:29

## 2021-01-01 RX ADMIN — SODIUM CHLORIDE, PRESERVATIVE FREE 10 ML: 5 INJECTION INTRAVENOUS at 21:41

## 2021-01-01 RX ADMIN — IPRATROPIUM BROMIDE 0.5 MG: 0.5 SOLUTION RESPIRATORY (INHALATION) at 13:32

## 2021-01-01 RX ADMIN — PANTOPRAZOLE SODIUM 40 MG: 40 TABLET, DELAYED RELEASE ORAL at 05:46

## 2021-01-01 RX ADMIN — METHYLPREDNISOLONE SODIUM SUCCINATE 20 MG: 40 INJECTION, POWDER, FOR SOLUTION INTRAMUSCULAR; INTRAVENOUS at 20:47

## 2021-01-01 RX ADMIN — IPRATROPIUM BROMIDE 0.5 MG: 0.5 SOLUTION RESPIRATORY (INHALATION) at 07:06

## 2021-01-01 RX ADMIN — BUDESONIDE AND FORMOTEROL FUMARATE DIHYDRATE 2 PUFF: 160; 4.5 AEROSOL RESPIRATORY (INHALATION) at 06:40

## 2021-01-01 RX ADMIN — DOXYCYCLINE 100 MG: 100 INJECTION, POWDER, LYOPHILIZED, FOR SOLUTION INTRAVENOUS at 20:37

## 2021-01-01 RX ADMIN — MEROPENEM 1 G: 1 INJECTION, POWDER, FOR SOLUTION INTRAVENOUS at 12:00

## 2021-01-01 RX ADMIN — FUROSEMIDE 100 MG: 10 INJECTION, SOLUTION INTRAMUSCULAR; INTRAVENOUS at 11:35

## 2021-01-01 RX ADMIN — FUROSEMIDE 20 MG: 10 INJECTION, SOLUTION INTRAMUSCULAR; INTRAVENOUS at 14:42

## 2021-01-01 RX ADMIN — Medication 50 MEQ: at 02:18

## 2021-01-01 RX ADMIN — IPRATROPIUM BROMIDE 0.5 MG: 0.5 SOLUTION RESPIRATORY (INHALATION) at 18:41

## 2021-01-01 RX ADMIN — LACTULOSE 300 ML: 10 SOLUTION ORAL at 01:30

## 2021-01-01 RX ADMIN — IPRATROPIUM BROMIDE 0.5 MG: 0.5 SOLUTION RESPIRATORY (INHALATION) at 01:46

## 2021-01-01 RX ADMIN — FUROSEMIDE 60 MG: 10 INJECTION, SOLUTION INTRAMUSCULAR; INTRAVENOUS at 09:31

## 2021-01-01 RX ADMIN — LACTULOSE 30 G: 20 SOLUTION ORAL at 09:13

## 2021-01-01 RX ADMIN — EPINEPHRINE 1 MG: 0.1 INJECTION INTRACARDIAC; INTRAVENOUS at 06:09

## 2021-01-01 RX ADMIN — DILTIAZEM HYDROCHLORIDE 5 MG: 5 INJECTION INTRAVENOUS at 16:22

## 2021-01-01 RX ADMIN — PHENYLEPHRINE HYDROCHLORIDE 0.5 MCG/KG/MIN: 10 INJECTION INTRAVENOUS at 23:56

## 2021-01-01 RX ADMIN — SODIUM BICARBONATE 50 MEQ: 84 INJECTION, SOLUTION INTRAVENOUS at 11:53

## 2021-01-01 RX ADMIN — SODIUM CHLORIDE, PRESERVATIVE FREE 10 ML: 5 INJECTION INTRAVENOUS at 09:01

## 2021-01-01 RX ADMIN — NYSTATIN 500000 UNITS: 100000 SUSPENSION ORAL at 22:30

## 2021-01-01 RX ADMIN — METHYLPREDNISOLONE SODIUM SUCCINATE 40 MG: 40 INJECTION, POWDER, FOR SOLUTION INTRAMUSCULAR; INTRAVENOUS at 20:17

## 2021-01-01 RX ADMIN — Medication 10 MG: at 18:43

## 2021-01-01 RX ADMIN — ALBUMIN HUMAN 25 G: 0.25 SOLUTION INTRAVENOUS at 16:27

## 2021-01-01 RX ADMIN — DEXTROSE MONOHYDRATE 50 ML: 500 INJECTION PARENTERAL at 14:03

## 2021-01-01 RX ADMIN — HEPARIN SODIUM 17.87 UNITS/KG/HR: 5000 INJECTION INTRAVENOUS; SUBCUTANEOUS at 10:07

## 2021-01-01 RX ADMIN — SODIUM ZIRCONIUM CYCLOSILICATE 10 G: 10 POWDER, FOR SUSPENSION ORAL at 09:02

## 2021-01-01 RX ADMIN — BUDESONIDE AND FORMOTEROL FUMARATE DIHYDRATE 2 PUFF: 160; 4.5 AEROSOL RESPIRATORY (INHALATION) at 07:14

## 2021-01-01 RX ADMIN — BUDESONIDE AND FORMOTEROL FUMARATE DIHYDRATE 2 PUFF: 160; 4.5 AEROSOL RESPIRATORY (INHALATION) at 20:00

## 2021-01-01 RX ADMIN — BUDESONIDE AND FORMOTEROL FUMARATE DIHYDRATE 2 PUFF: 160; 4.5 AEROSOL RESPIRATORY (INHALATION) at 19:36

## 2021-01-01 RX ADMIN — NYSTATIN 500000 UNITS: 100000 SUSPENSION ORAL at 14:17

## 2021-01-01 RX ADMIN — BUDESONIDE AND FORMOTEROL FUMARATE DIHYDRATE 2 PUFF: 160; 4.5 AEROSOL RESPIRATORY (INHALATION) at 07:29

## 2021-01-01 RX ADMIN — SODIUM CHLORIDE 15 MG/HR: 900 INJECTION, SOLUTION INTRAVENOUS at 22:54

## 2021-01-01 RX ADMIN — SODIUM CHLORIDE, PRESERVATIVE FREE 10 ML: 5 INJECTION INTRAVENOUS at 08:39

## 2021-01-01 RX ADMIN — THIAMINE HYDROCHLORIDE 100 MG: 100 INJECTION, SOLUTION INTRAMUSCULAR; INTRAVENOUS at 08:40

## 2021-01-01 RX ADMIN — DEXTROSE MONOHYDRATE 50 ML: 25 INJECTION, SOLUTION INTRAVENOUS at 10:40

## 2021-01-01 RX ADMIN — IPRATROPIUM BROMIDE 0.5 MG: 0.5 SOLUTION RESPIRATORY (INHALATION) at 13:16

## 2021-01-01 RX ADMIN — MEROPENEM 1 G: 1 INJECTION, POWDER, FOR SOLUTION INTRAVENOUS at 12:04

## 2021-01-01 RX ADMIN — HEPARIN SODIUM 21.8 UNITS/KG/HR: 5000 INJECTION INTRAVENOUS; SUBCUTANEOUS at 22:11

## 2021-01-01 RX ADMIN — Medication 0.3 MCG/KG/MIN: at 08:35

## 2021-01-01 RX ADMIN — FUROSEMIDE 60 MG: 10 INJECTION, SOLUTION INTRAMUSCULAR; INTRAVENOUS at 17:18

## 2021-01-01 RX ADMIN — DOXYCYCLINE 100 MG: 100 INJECTION, POWDER, LYOPHILIZED, FOR SOLUTION INTRAVENOUS at 09:40

## 2021-01-01 RX ADMIN — DOBUTAMINE IN DEXTROSE 3 MCG/KG/MIN: 200 INJECTION, SOLUTION INTRAVENOUS at 14:25

## 2021-01-01 RX ADMIN — SODIUM CHLORIDE, PRESERVATIVE FREE 10 ML: 5 INJECTION INTRAVENOUS at 09:58

## 2021-01-01 RX ADMIN — HUMAN INSULIN 10 UNITS: 100 INJECTION, SOLUTION SUBCUTANEOUS at 09:43

## 2021-01-01 RX ADMIN — MEROPENEM 1 G: 1 INJECTION, POWDER, FOR SOLUTION INTRAVENOUS at 11:27

## 2021-01-01 RX ADMIN — MEROPENEM 1 G: 1 INJECTION, POWDER, FOR SOLUTION INTRAVENOUS at 01:30

## 2021-01-01 RX ADMIN — BUDESONIDE AND FORMOTEROL FUMARATE DIHYDRATE 2 PUFF: 160; 4.5 AEROSOL RESPIRATORY (INHALATION) at 18:24

## 2021-01-01 RX ADMIN — DOBUTAMINE HYDROCHLORIDE 1 MCG/KG/MIN: 200 INJECTION INTRAVENOUS at 19:17

## 2021-01-01 RX ADMIN — ALBUMIN HUMAN 25 G: 0.25 SOLUTION INTRAVENOUS at 20:28

## 2021-01-01 RX ADMIN — IPRATROPIUM BROMIDE 0.5 MG: 0.5 SOLUTION RESPIRATORY (INHALATION) at 02:00

## 2021-01-01 RX ADMIN — THIAMINE HYDROCHLORIDE 100 MG: 100 INJECTION, SOLUTION INTRAMUSCULAR; INTRAVENOUS at 19:26

## 2021-01-01 RX ADMIN — SODIUM ZIRCONIUM CYCLOSILICATE 10 G: 10 POWDER, FOR SUSPENSION ORAL at 20:28

## 2021-01-01 RX ADMIN — CALCIUM CARBONATE 1 TABLET: 500 TABLET, CHEWABLE ORAL at 01:48

## 2021-01-01 RX ADMIN — IPRATROPIUM BROMIDE AND ALBUTEROL SULFATE 3 ML: .5; 2.5 SOLUTION RESPIRATORY (INHALATION) at 06:55

## 2021-01-01 RX ADMIN — SODIUM CHLORIDE 15 MG/HR: 900 INJECTION, SOLUTION INTRAVENOUS at 08:07

## 2021-01-01 RX ADMIN — METOPROLOL TARTRATE 50 MG: 50 TABLET, FILM COATED ORAL at 14:42

## 2021-01-01 RX ADMIN — HEPARIN SODIUM 5000 UNITS: 5000 INJECTION INTRAVENOUS; SUBCUTANEOUS at 04:49

## 2021-01-01 RX ADMIN — EPINEPHRINE 1 MG: 0.1 INJECTION INTRACARDIAC; INTRAVENOUS at 06:00

## 2021-01-01 RX ADMIN — DOXYCYCLINE 100 MG: 100 INJECTION, POWDER, LYOPHILIZED, FOR SOLUTION INTRAVENOUS at 08:38

## 2021-01-01 RX ADMIN — METHYLPREDNISOLONE SODIUM SUCCINATE 40 MG: 40 INJECTION, POWDER, FOR SOLUTION INTRAMUSCULAR; INTRAVENOUS at 21:18

## 2021-01-01 RX ADMIN — BUDESONIDE AND FORMOTEROL FUMARATE DIHYDRATE 2 PUFF: 160; 4.5 AEROSOL RESPIRATORY (INHALATION) at 07:34

## 2021-01-01 RX ADMIN — SODIUM CHLORIDE 1000 ML: 9 INJECTION, SOLUTION INTRAVENOUS at 02:53

## 2021-01-01 RX ADMIN — SODIUM ZIRCONIUM CYCLOSILICATE 10 G: 10 POWDER, FOR SUSPENSION ORAL at 23:38

## 2021-01-01 RX ADMIN — SODIUM CHLORIDE, PRESERVATIVE FREE 10 ML: 5 INJECTION INTRAVENOUS at 20:46

## 2021-01-01 RX ADMIN — ONDANSETRON 4 MG: 2 INJECTION INTRAMUSCULAR; INTRAVENOUS at 04:42

## 2021-01-01 RX ADMIN — IPRATROPIUM BROMIDE AND ALBUTEROL SULFATE 3 ML: .5; 2.5 SOLUTION RESPIRATORY (INHALATION) at 07:27

## 2021-01-01 RX ADMIN — METHYLPREDNISOLONE SODIUM SUCCINATE 40 MG: 40 INJECTION, POWDER, FOR SOLUTION INTRAMUSCULAR; INTRAVENOUS at 20:37

## 2021-01-01 RX ADMIN — ALBUMIN HUMAN 25 G: 0.25 SOLUTION INTRAVENOUS at 20:30

## 2021-01-01 RX ADMIN — METOPROLOL TARTRATE 50 MG: 50 TABLET, FILM COATED ORAL at 20:08

## 2021-01-01 RX ADMIN — Medication 50 MEQ: at 13:57

## 2021-01-01 RX ADMIN — BUDESONIDE AND FORMOTEROL FUMARATE DIHYDRATE 2 PUFF: 160; 4.5 AEROSOL RESPIRATORY (INHALATION) at 19:15

## 2021-01-01 RX ADMIN — MORPHINE SULFATE 1 MG: 2 INJECTION, SOLUTION INTRAMUSCULAR; INTRAVENOUS at 04:51

## 2021-01-01 RX ADMIN — DOBUTAMINE IN DEXTROSE 3 MCG/KG/MIN: 200 INJECTION, SOLUTION INTRAVENOUS at 18:09

## 2021-01-01 RX ADMIN — DEXTROSE MONOHYDRATE 50 ML: 500 INJECTION PARENTERAL at 09:42

## 2021-01-01 RX ADMIN — METHYLPREDNISOLONE SODIUM SUCCINATE 40 MG: 40 INJECTION, POWDER, FOR SOLUTION INTRAMUSCULAR; INTRAVENOUS at 08:38

## 2021-01-01 RX ADMIN — SODIUM BICARBONATE 50 MEQ: 84 INJECTION INTRAVENOUS at 02:18

## 2021-01-01 RX ADMIN — AMIODARONE HYDROCHLORIDE 0.5 MG/MIN: 1.8 INJECTION, SOLUTION INTRAVENOUS at 04:07

## 2021-01-01 RX ADMIN — SODIUM CHLORIDE, PRESERVATIVE FREE 10 ML: 5 INJECTION INTRAVENOUS at 20:12

## 2021-01-01 RX ADMIN — PIPERACILLIN SODIUM AND TAZOBACTAM SODIUM 3.38 G: 3; .375 INJECTION, POWDER, LYOPHILIZED, FOR SOLUTION INTRAVENOUS at 14:43

## 2021-01-01 RX ADMIN — DOXYCYCLINE 100 MG: 100 INJECTION, POWDER, LYOPHILIZED, FOR SOLUTION INTRAVENOUS at 09:59

## 2021-01-01 RX ADMIN — DOPAMINE HYDROCHLORIDE 4 MCG/KG/MIN: 160 INJECTION, SOLUTION INTRAVENOUS at 10:36

## 2021-01-01 RX ADMIN — BUDESONIDE AND FORMOTEROL FUMARATE DIHYDRATE 2 PUFF: 160; 4.5 AEROSOL RESPIRATORY (INHALATION) at 18:35

## 2021-01-01 RX ADMIN — ALBUMIN HUMAN 25 G: 0.25 SOLUTION INTRAVENOUS at 09:31

## 2021-01-01 RX ADMIN — DEXTROSE MONOHYDRATE 50 ML: 500 INJECTION PARENTERAL at 18:26

## 2021-01-01 RX ADMIN — CALCIUM CARBONATE 1 TABLET: 500 TABLET, CHEWABLE ORAL at 03:23

## 2021-01-01 RX ADMIN — DEXAMETHASONE SODIUM PHOSPHATE 6 MG: 4 INJECTION INTRA-ARTICULAR; INTRALESIONAL; INTRAMUSCULAR; INTRAVENOUS; SOFT TISSUE at 10:25

## 2021-01-01 RX ADMIN — EPINEPHRINE 1 MG: 0.1 INJECTION INTRACARDIAC; INTRAVENOUS at 08:03

## 2021-01-01 RX ADMIN — DEXTROSE MONOHYDRATE 50 ML: 500 INJECTION PARENTERAL at 01:51

## 2021-01-01 RX ADMIN — IPRATROPIUM BROMIDE AND ALBUTEROL SULFATE 3 ML: .5; 2.5 SOLUTION RESPIRATORY (INHALATION) at 18:24

## 2021-01-01 RX ADMIN — PANTOPRAZOLE SODIUM 40 MG: 40 TABLET, DELAYED RELEASE ORAL at 08:40

## 2021-01-01 RX ADMIN — LACTULOSE 30 G: 20 SOLUTION ORAL at 09:02

## 2021-01-01 RX ADMIN — SODIUM BICARBONATE 100 MEQ: 84 INJECTION, SOLUTION INTRAVENOUS at 06:05

## 2021-01-01 RX ADMIN — MEROPENEM 1 G: 1 INJECTION, POWDER, FOR SOLUTION INTRAVENOUS at 04:05

## 2021-01-01 RX ADMIN — HEPARIN SODIUM 19.87 UNITS/KG/HR: 5000 INJECTION INTRAVENOUS; SUBCUTANEOUS at 23:44

## 2021-01-01 RX ADMIN — PIPERACILLIN SODIUM AND TAZOBACTAM SODIUM 3.38 G: 3; .375 INJECTION, POWDER, LYOPHILIZED, FOR SOLUTION INTRAVENOUS at 22:09

## 2021-01-01 RX ADMIN — HEPARIN SODIUM 23.87 UNITS/KG/HR: 5000 INJECTION INTRAVENOUS; SUBCUTANEOUS at 09:49

## 2021-01-01 RX ADMIN — IPRATROPIUM BROMIDE 0.5 MG: 0.5 SOLUTION RESPIRATORY (INHALATION) at 07:14

## 2021-01-01 RX ADMIN — SODIUM CHLORIDE, PRESERVATIVE FREE 10 ML: 5 INJECTION INTRAVENOUS at 09:38

## 2021-01-01 RX ADMIN — SODIUM CHLORIDE 1000 ML: 9 INJECTION, SOLUTION INTRAVENOUS at 04:13

## 2021-01-01 RX ADMIN — SODIUM CHLORIDE 250 ML: 9 INJECTION, SOLUTION INTRAVENOUS at 03:05

## 2021-01-01 RX ADMIN — LACTULOSE 30 G: 20 SOLUTION ORAL at 09:19

## 2021-01-01 RX ADMIN — METOPROLOL TARTRATE 25 MG: 25 TABLET, FILM COATED ORAL at 20:37

## 2021-01-01 RX ADMIN — NYSTATIN 500000 UNITS: 100000 SUSPENSION ORAL at 12:20

## 2021-01-01 RX ADMIN — NYSTATIN 500000 UNITS: 100000 SUSPENSION ORAL at 20:27

## 2021-01-01 RX ADMIN — LACTULOSE 30 G: 20 SOLUTION ORAL at 21:21

## 2021-01-01 RX ADMIN — METHYLPREDNISOLONE SODIUM SUCCINATE 40 MG: 40 INJECTION, POWDER, FOR SOLUTION INTRAMUSCULAR; INTRAVENOUS at 08:41

## 2021-01-01 RX ADMIN — DOXYCYCLINE 100 MG: 100 INJECTION, POWDER, LYOPHILIZED, FOR SOLUTION INTRAVENOUS at 20:14

## 2021-01-01 RX ADMIN — LACTULOSE 30 G: 20 SOLUTION ORAL at 18:14

## 2021-01-01 RX ADMIN — DOBUTAMINE HYDROCHLORIDE 2.5 MCG/KG/MIN: 200 INJECTION INTRAVENOUS at 11:51

## 2021-01-01 RX ADMIN — HEPARIN SODIUM 5000 UNITS: 5000 INJECTION INTRAVENOUS; SUBCUTANEOUS at 11:35

## 2021-01-01 RX ADMIN — BISACODYL 10 MG: 10 SUPPOSITORY RECTAL at 18:04

## 2021-01-01 RX ADMIN — MEROPENEM 1 G: 1 INJECTION, POWDER, FOR SOLUTION INTRAVENOUS at 23:49

## 2021-01-01 RX ADMIN — MONTELUKAST SODIUM 10 MG: 10 TABLET, COATED ORAL at 21:17

## 2021-01-01 RX ADMIN — BUDESONIDE AND FORMOTEROL FUMARATE DIHYDRATE 2 PUFF: 160; 4.5 AEROSOL RESPIRATORY (INHALATION) at 06:59

## 2021-01-01 RX ADMIN — DOXYCYCLINE 100 MG: 100 INJECTION, POWDER, LYOPHILIZED, FOR SOLUTION INTRAVENOUS at 08:41

## 2021-01-01 RX ADMIN — THIAMINE HYDROCHLORIDE 100 MG: 100 INJECTION, SOLUTION INTRAMUSCULAR; INTRAVENOUS at 09:38

## 2021-01-01 RX ADMIN — IPRATROPIUM BROMIDE AND ALBUTEROL SULFATE 3 ML: .5; 2.5 SOLUTION RESPIRATORY (INHALATION) at 07:34

## 2021-01-01 RX ADMIN — SODIUM POLYSTYRENE SULFONATE 30 G: 1 POWDER ORAL; RECTAL at 12:02

## 2021-01-01 RX ADMIN — BUDESONIDE AND FORMOTEROL FUMARATE DIHYDRATE 2 PUFF: 160; 4.5 AEROSOL RESPIRATORY (INHALATION) at 07:12

## 2021-01-01 RX ADMIN — Medication 50 MEQ: at 11:53

## 2021-01-01 RX ADMIN — LACTULOSE 30 G: 20 SOLUTION ORAL at 22:30

## 2021-01-01 RX ADMIN — SODIUM CHLORIDE 125 ML/HR: 9 INJECTION, SOLUTION INTRAVENOUS at 04:14

## 2021-01-01 RX ADMIN — METOPROLOL TARTRATE 5 MG: 5 INJECTION INTRAVENOUS at 23:49

## 2021-01-01 RX ADMIN — CALCIUM GLUCONATE 2 G: 98 INJECTION, SOLUTION INTRAVENOUS at 23:38

## 2021-01-01 RX ADMIN — NYSTATIN 500000 UNITS: 100000 SUSPENSION ORAL at 08:19

## 2021-01-01 RX ADMIN — ALBUTEROL SULFATE 2.5 MG: 2.5 SOLUTION RESPIRATORY (INHALATION) at 11:14

## 2021-01-01 RX ADMIN — HEPARIN SODIUM 5000 UNITS: 5000 INJECTION INTRAVENOUS; SUBCUTANEOUS at 18:47

## 2021-01-01 RX ADMIN — NYSTATIN 500000 UNITS: 100000 SUSPENSION ORAL at 20:32

## 2021-01-01 RX ADMIN — PHENYLEPHRINE HYDROCHLORIDE 3 MCG/KG/MIN: 10 INJECTION INTRAVENOUS at 06:20

## 2021-01-01 RX ADMIN — INFLUENZA VIRUS VACCINE 0.5 ML: 15; 15; 15; 15 SUSPENSION INTRAMUSCULAR at 22:32

## 2021-01-01 RX ADMIN — BUDESONIDE AND FORMOTEROL FUMARATE DIHYDRATE 2 PUFF: 160; 4.5 AEROSOL RESPIRATORY (INHALATION) at 19:25

## 2021-01-01 RX ADMIN — IPRATROPIUM BROMIDE AND ALBUTEROL SULFATE 3 ML: .5; 2.5 SOLUTION RESPIRATORY (INHALATION) at 06:59

## 2021-01-01 RX ADMIN — DOXYCYCLINE 100 MG: 100 INJECTION, POWDER, LYOPHILIZED, FOR SOLUTION INTRAVENOUS at 09:31

## 2021-01-01 RX ADMIN — HUMAN INSULIN 10 UNITS: 100 INJECTION, SOLUTION SUBCUTANEOUS at 10:40

## 2021-01-01 RX ADMIN — LACTULOSE 30 G: 20 SOLUTION ORAL at 20:48

## 2021-01-01 RX ADMIN — SODIUM CHLORIDE 5 MG/HR: 900 INJECTION, SOLUTION INTRAVENOUS at 18:04

## 2021-01-01 RX ADMIN — PIPERACILLIN SODIUM AND TAZOBACTAM SODIUM 3.38 G: 3; .375 INJECTION, POWDER, LYOPHILIZED, FOR SOLUTION INTRAVENOUS at 06:17

## 2021-01-01 RX ADMIN — LACTULOSE 30 G: 20 SOLUTION ORAL at 21:22

## 2021-01-01 RX ADMIN — SODIUM CHLORIDE, PRESERVATIVE FREE 10 ML: 5 INJECTION INTRAVENOUS at 09:48

## 2021-01-01 RX ADMIN — LACTULOSE 30 G: 20 SOLUTION ORAL at 15:46

## 2021-01-01 RX ADMIN — LACTULOSE 30 G: 20 SOLUTION ORAL at 20:28

## 2021-01-01 RX ADMIN — DOXYCYCLINE 100 MG: 100 INJECTION, POWDER, LYOPHILIZED, FOR SOLUTION INTRAVENOUS at 20:46

## 2021-01-01 RX ADMIN — BUDESONIDE AND FORMOTEROL FUMARATE DIHYDRATE 2 PUFF: 160; 4.5 AEROSOL RESPIRATORY (INHALATION) at 07:27

## 2021-01-01 RX ADMIN — METOPROLOL TARTRATE 50 MG: 50 TABLET, FILM COATED ORAL at 20:38

## 2021-01-01 RX ADMIN — HEPARIN SODIUM 16.86 UNITS/KG/HR: 5000 INJECTION INTRAVENOUS; SUBCUTANEOUS at 12:13

## 2021-01-01 RX ADMIN — THIAMINE HYDROCHLORIDE 100 MG: 100 INJECTION, SOLUTION INTRAMUSCULAR; INTRAVENOUS at 09:52

## 2021-01-01 RX ADMIN — ALBUMIN HUMAN 25 G: 0.25 SOLUTION INTRAVENOUS at 16:41

## 2021-01-01 RX ADMIN — ALBUMIN HUMAN 25 G: 0.25 SOLUTION INTRAVENOUS at 17:11

## 2021-12-09 PROBLEM — I26.93 SINGLE SUBSEGMENTAL PULMONARY EMBOLISM WITHOUT ACUTE COR PULMONALE (HCC): Status: ACTIVE | Noted: 2021-01-01

## 2021-12-09 NOTE — H&P
[facility] Medicine Services  History & Physical          Patient Identification:  Name:  Tiera Mendoza  Age:  46 y.o.  Sex:  female  :  1975  MRN:  [id number]   Admit Date: 2021   Visit Number:  [id number]  Primary Care Physician:  Alan Martin APRN    I have seen the patient in conjunction with PEDRO Sullivan and I agree with the following statements:     Subjective     Chief complaint: short of breath     History of presenting illness:    Mrs. Mendoza is a 46 year old female patient who presented to [facility] ED on 2021 due to shortness of breath.  She states that she has been diagnosed with pneumonia around 10 days ago by her PCP, she was started on doxycycline and a Medrol Dosepak.  She states 5 days into this she was feeling better and then started feeling bad again.  He did complete 9 days of this.  She denies any fever at any time.  Does report getting choked on food it feels like it gets stuck in her throat, she states this is been going on for 5 years.  She denies any history of blood clots, she denies any recent surgeries, no prolonged sitting/traveling, no history of sleep apnea, she does not take hormone replacement therapy and is not on birth control.  She denies any known family history of blood clots.    Her treatment in the ER included Tylenol 1 g p.o., Rocephin 2 g IV x1, Decadron 6 mg IV x1, Zosyn 3.375 g IV x1, she has had a total of 3 L of normal saline. She was also started on a heparin drip with bolus. Her v/s in the ER were temperature 98.5, heart rate 120 3130, respirations 24, blood pressure 105/83, saturation 97 to 98%.Negative HCG and hx of tubal.     Her past medical history is significant for gestational diabetes, asthma, history of A. Fib (states this was found in  when she had a child that , has never been on anticoagulation, he states when she had another child in 2017 she was not told she was in A. fib at that time) history of  "substance abuse, she states she has recently done meth, it has been 2 months, she denies any current or past use of IV drugs.  He denies any use of tobacco.  She denies any alcohol use.  She denies any history of cardiac stenting, no history of peripheral vascular disease, she denies any history of heart failure. She is still legally  to her  David but they are currently , she does have an adult daughter Aisha.  No family is present on my exam.      ---------------------------------------------------------------------------------------------------------------------   Review of Systems   Constitutional: Negative for chills, diaphoresis, fatigue and fever.   Respiratory: Positive for shortness of breath.    Cardiovascular: Negative for chest pain.   Gastrointestinal: Negative for constipation, diarrhea, nausea and vomiting.   Genitourinary: Negative for difficulty urinating.   Musculoskeletal: Negative for arthralgias and myalgias.   Skin: Negative for color change.   Neurological: Negative for dizziness, weakness and light-headedness.   Psychiatric/Behavioral: Negative for agitation, behavioral problems and confusion.      ---------------------------------------------------------------------------------------------------------------------   Past Medical History:   Diagnosis Date   • Asthma    • Diabetes mellitus (CMS/[facility])     GDM last pregnancy   • History of atrial fibrillation    • Substance abuse (CMS/[facility])      Past Surgical History:   Procedure Laterality Date   •  SECTION      x 3   • HIP FRACTURE SURGERY  2017     No family history on file.  Social History     Socioeconomic History   • Marital status:    Tobacco Use   • Smoking status: Never Smoker   • Smokeless tobacco: Current User     Types: Chew   Vaping Use   • Vaping Use: Never used   Substance and Sexual Activity   • Alcohol use: No   • Drug use: Yes     Types: Hydrocodone, Codeine     Comment: \"stopped " "around 6 weeks pregnant\"   • Sexual activity: Defer     ---------------------------------------------------------------------------------------------------------------------   Allergies:  Patient has no known allergies.  ---------------------------------------------------------------------------------------------------------------------     Medications below are reported home medications pulling from within the system; at this time, these medications have not been reconciled unless otherwise specified and are in the verification process for further verifcation as current home medications.    Prior to Admission Medications     Prescriptions Last Dose Informant Patient Reported? Taking?    albuterol (PROVENTIL HFA;VENTOLIN HFA) 108 (90 BASE) MCG/ACT inhaler 12/8/2021 Self Yes Yes    Inhale 2 puffs Every 4 (Four) Hours As Needed for Wheezing or Shortness of Air.    albuterol (PROVENTIL) (2.5 MG/3ML) 0.083% nebulizer solution 12/8/2021 Self Yes Yes    Take 2.5 mg by nebulization Every 4 (Four) Hours As Needed for Wheezing or Shortness of Air.    budesonide-formoterol (SYMBICORT) 160-4.5 MCG/ACT inhaler 12/8/2021 Self Yes Yes    Inhale 2 puffs 2 (Two) Times a Day.    montelukast (SINGULAIR) 10 MG tablet 12/8/2021 Self Yes Yes    Take 10 mg by mouth Every Night.        Hospital Scheduled Meds:  budesonide-formoterol, 2 puff, Inhalation, BID - RT  montelukast, 10 mg, Oral, Nightly  piperacillin-tazobactam, 3.375 g, Intravenous, Once  piperacillin-tazobactam, 3.375 g, Intravenous, Q8H      heparin (porcine), 7.87 Units/kg/hr, Last Rate: 11.87 Units/kg/hr (12/09/21 1136)  Pharmacy to Dose Zosyn,   sodium chloride, 125 mL/hr, Last Rate: Stopped (12/09/21 17)      ---------------------------------------------------------------------------------------------------------------------   Objective       Vital Signs:  Temp:  [98.5 °F (36.9 °C)] 98.5 °F (36.9 °C)  Heart Rate:  [110-131] 110  Resp:  [22-24] 24  BP: (105-135)/() " 122/86      12/09/21  0155   Weight: 127 kg (280 lb)     Body mass index is 48.06 kg/m².  ---------------------------------------------------------------------------------------------------------------------       Physical Exam    Physical Exam:  Constitutional: Obese female lying on ER stretcher on 2 L nasal cannula saturating 98%  HENT:  Head: Normocephalic and atraumatic.  Mouth:  Moist mucous membranes.    Eyes:  Conjunctivae and EOM are normal.  Pupils are equal, round, and reactive to light.  No scleral icterus.  Neck:  Neck supple.  No JVD present.    Cardiovascular:  Normal rate, regular rhythm.  with no murmur.  Pulmonary/Chest: Does get dyspneic while talking, maintain saturation, is in no acute distress, bilateral lower lobes with crackles  Abdominal:  Soft.  Bowel sounds are present.  No distension and no tenderness.   Musculoskeletal:  No edema, no tenderness, and no deformity.    Neurological:  Alert and oriented to person, place, and time.  No tongue deviation.  No facial droop.  No slurred speech.   Skin:  Skin is warm and dry.  No rash noted.  No pallor.   Psychiatric:  Normal mood and affect.  Behavior is normal.  Judgment and thought content normal.   Peripheral vascular:  No edema and strong pulses on all 4 extremities.  ---------------------------------------------------------------------------------------------------------------------  EKG:         ---------------------------------------------------------------------------------------------------------------------   Results from last 7 days   Lab Units 12/09/21  1110 12/09/21  0426 12/09/21  0233   CRP mg/dL  --   --  26.44*   LACTATE mmol/L  --  3.6* 2.9*   WBC 10*3/mm3  --   --  29.87*   HEMOGLOBIN g/dL  --   --  10.5*   HEMATOCRIT %  --   --  33.6*   MCV fL  --   --  79.1   MCHC g/dL  --   --  31.3*   PLATELETS 10*3/mm3  --   --  318   INR  1.66* 1.42*  --          Results from last 7 days   Lab Units 12/09/21  0233   SODIUM mmol/L 130*    POTASSIUM mmol/L 4.3   CHLORIDE mmol/L 93*   CO2 mmol/L 24.0   BUN mg/dL 22*   CREATININE mg/dL 1.05*   EGFR IF NONAFRICN AM mL/min/1.73 57*   CALCIUM mg/dL 8.7   GLUCOSE mg/dL 136*   ALBUMIN g/dL 3.31*   BILIRUBIN mg/dL 2.8*   ALK PHOS U/L 113   AST (SGOT) U/L 134*   ALT (SGPT) U/L 191*   Estimated Creatinine Clearance: 89.3 mL/min (A) (by C-G formula based on SCr of 1.05 mg/dL (H)).  No results found for: AMMONIA  Results from last 7 days   Lab Units 12/09/21  0233   TROPONIN T ng/mL 0.016     Results from last 7 days   Lab Units 12/09/21  0233   PROBNP pg/mL 4,147.0*     Lab Results   Component Value Date    HGBA1C 5.70 (H) 12/03/2020     Lab Results   Component Value Date    TSH 2.837 07/08/2018    FREET4 1.00 07/08/2018     Lab Results   Component Value Date    PREGTESTUR Negative 12/03/2020     Pain Management Panel     Pain Management Panel Latest Ref Rng & Units 12/9/2021 12/3/2020    AMPHETAMINES SCREEN, URINE Negative Positive(A) Positive(A)    BARBITURATES SCREEN Negative Negative Negative    BENZODIAZEPINE SCREEN, URINE Negative Negative Negative    BUPRENORPHINEUR Negative Negative Negative    COCAINE SCREEN, URINE Negative Negative Negative    METHADONE SCREEN, URINE Negative Negative Negative    METHAMPHETAMINEUR Negative Positive(A) -        No results found for: BLOODCX  No results found for: URINECX  No results found for: WOUNDCX  No results found for: STOOLCX      ---------------------------------------------------------------------------------------------------------------------  Imaging Results (Last 7 Days)     Procedure Component Value Units Date/Time    CT Chest Pulmonary Embolism [[id number]] Collected: 12/09/21 0409     Updated: 12/09/21 0411    Narrative:      CT CHEST PULMONARY EMBOLISM W CONTRAST    INDICATION:   Sepsis and recurrent pneumonia    TECHNIQUE:   CT angiogram of the chest with IV contrast. 3-D reconstructions were obtained and reviewed.   Radiation dose reduction techniques  included automated exposure control or exposure modulation based on body size. Count of known CT and cardiac nuc med studies  performed in previous 12 months: 0.     COMPARISON:   None available.    FINDINGS:   There is adequate opacification of pulmonary arteries. There is right lower lobe pulmonary embolus which is occlusive bases. There is dense consolidation throughout the right lower lobe. There is mild interstitial prominence throughout the lungs there  are some patchy areas of groundglass density in the upper lobes. Aorta is normal in size. There is a small right effusion. There is no adenopathy.          Impression:      Right lower lobe pulmonary embolus which appears occlusive in the lower branches. There is no evidence of pulmonary infarct.    Dense groundglass infiltrate throughout the right lower lobe    Patchy groundglass infiltrates throughout the rest the lungs with diffuse interstitial prominence.    There is no evidence of right heart enlargement    I discussed these results with Dr. Naranjo at the time of this dictation    Signer Name: Kevin Barrios MD   Signed: 12/9/2021 4:09 AM   Workstation [facility]: [email or web address]    [facility]    XR Chest 1 View [[id number]] Collected: 12/09/21 0326     Updated: 12/09/21 0328    Narrative:      CR Chest 1 Vw    INDICATION:   Shortness during cough, follow pneumonia     COMPARISON:    220    FINDINGS:  Portable AP view(s) of the chest. There Are low lung volumes with mild right base atelectasis or infiltrate. Left lung appears clear. Heart size normal. Bones are      Impression:      Low inspiratory volumes with probable right lower lobe infiltrate or atelectasis    Signer Name: Kevin Barrios MD   Signed: 12/9/2021 3:26 AM   Workstation Name: [email or web address]    [facility]          ---------------------------------------------------------------------------------------------------------------------  Assessment / Plan        Assessment and Plan:      Severe Sepsis (lactic 3.6, CRP 26.44, WBC 29.87, HR >90, RR >22)  CAP vs Aspiration   -2 sets of blood cultures collected in the ER  -COVID-19 and influenza negative on admission   -Respiratory panel PCR pending  -Chest x-ray admission today showed low inspiratory volumes with probable right lower lobe filtrate atelectasis.  -CT of the chest with PE protocol on admission today showed dense groundglass infiltrate throughout the right lower lobe, patchy groundglass infiltrates throughout the rest of the lungs with diffuse interstitial prominence  -has been on medrol dose pack and 10 days of doxycyline at home prior to admission  -Zosyn and Rocephin given in the ER  -Had 3 liters of NS In the ER  -Continue Zosyn and doxycycline  -Pulmonary consulted  -Atrovent nebs holding albuterol given her tachycardia  -SLP evaluation ordered  -Aspiration precautions   -Incentive spirometer  -Last lactic was 3.6, repeat ordered  -Repeat labs in the am    RLL PE   -CT the chest with PE protocol showed a right lower lobe PE which appears occlusive in the lower branches, no evidence of pulmonary infarct.  -She was started on a heparin drip with bolus in the ER per protocol, continue on arrival to the floor   -STAT echo pending    Elevated proBNP  -BP admissions 4147  -She denies any history of heart failure, likely 2/2 to above  -Stat echo pending    Hyperglycemia  -glucose is 136  -possibly related to recent steroid use   -A1c ordered     CELSO  Hyponatremia  -Sodium is 130 on admission  -Creatinine is 1.05  -baseline is around 0.7  -IV fluids given in the ER, monitor urine output   -Repeat labs in the am    Elevated transaminases  Hyperbilirubinemia  -  -  -Total bili 2.8  -INR on admission was 1.42, repeat was 1.66  -Acute Hepatitis panel ordered  -Repeat CMP in the morning    Possible Atrial Flutter with RVR  -has history of A-Fib listed not on asa or anticoagulation at home    -MPR2CY-TLHx score 1 (as of now, awaiting echo)  -currently on heparin gtt for acute PE  -Cardizem gtt started in the ER  -Cardiology consulted  -continuous cardiac monitoring    Normocytic anemia  -H/H 10.5/33.6  -Repeat in the am     Illicit Drug Use  -UDS is positive for amphetamines   -patient denies any recent drug use, states the last time she used meth was around 2 months ago, she denies any use of IV Drugs    Hx of Asthma  -continue Singulair  -continue Symbicort     Morbid Obesity  -BMI 48.06  -affects all aspects of care    Diet: NPO, accu checks every 6 hours   Activity: bed rest  DVT prophylaxis: Heparin GTT    Code status: Full      Disposition home once clinically stable     Patient is high risk for the following reasons: Severe sepsis 2/2 CAP/Aspiration PNA, Acute PE, Atrial Flutter with RVR    Sarina Arzate, APRN  12/09/21  16:02 EST  ---------------------------------------------------------------------------------------------------------------------

## 2021-12-09 NOTE — ED PROVIDER NOTES
"Subjective   46-year-old female presenting with cough and shortness of breath.  Symptoms have been ongoing for over a week.  She was seen by her PCP and started on doxycycline, just finished a 10-day course.  She states she initially had some improvement but for the last 5 days has worsened with increasing fatigue and dyspnea.  She is still having productive cough and intermittent low-grade fevers.  Denies chest pain.  Denies vomiting or diarrhea.  Denies recent travel or sick contacts including COVID-19.      History provided by:  Patient   used: No        Review of Systems   Constitutional: Positive for fatigue and fever.   HENT: Negative.    Eyes: Negative.    Respiratory: Positive for cough and shortness of breath.    Cardiovascular: Negative.  Negative for chest pain.   Gastrointestinal: Negative.  Negative for abdominal pain.   Genitourinary: Negative.  Negative for dysuria.   Musculoskeletal: Negative.    Skin: Negative.    Neurological: Negative.    Psychiatric/Behavioral: Negative.    All other systems reviewed and are negative.      Past Medical History:   Diagnosis Date   • Asthma    • Diabetes mellitus (CMS/[facility])     GDM last pregnancy   • History of atrial fibrillation    • Substance abuse (CMS/[facility])        No Known Allergies    Past Surgical History:   Procedure Laterality Date   •  SECTION      x 3   • HIP FRACTURE SURGERY  2017       No family history on file.    Social History     Socioeconomic History   • Marital status:    Tobacco Use   • Smoking status: Never Smoker   • Smokeless tobacco: Current User     Types: Chew   Vaping Use   • Vaping Use: Never used   Substance and Sexual Activity   • Alcohol use: No   • Drug use: Yes     Types: Hydrocodone, Codeine     Comment: \"stopped around 6 weeks pregnant\"   • Sexual activity: Defer           Objective   Physical Exam  Vitals and nursing note reviewed.   Constitutional:       General: She is not in acute " distress.     Appearance: She is well-developed. She is not diaphoretic.   HENT:      Head: Normocephalic and atraumatic.      Right Ear: External ear normal.      Left Ear: External ear normal.      Nose: Nose normal.   Eyes:      Conjunctiva/sclera: Conjunctivae normal.      Pupils: Pupils are equal, round, and reactive to light.   Neck:      Vascular: No JVD.      Trachea: No tracheal deviation.   Cardiovascular:      Rate and Rhythm: Regular rhythm. Tachycardia present.      Heart sounds: Normal heart sounds. No murmur heard.      Pulmonary:      Effort: Pulmonary effort is normal. No respiratory distress.      Breath sounds: Decreased breath sounds present. No wheezing.      Comments: Coarse  Abdominal:      General: Bowel sounds are normal.      Palpations: Abdomen is soft.      Tenderness: There is no abdominal tenderness.   Musculoskeletal:         General: No deformity. Normal range of motion.      Cervical back: Normal range of motion and neck supple.   Skin:     General: Skin is warm and dry.      Coloration: Skin is not pale.      Findings: No erythema or rash.   Neurological:      Mental Status: She is alert and oriented to person, place, and time.      Cranial Nerves: No cranial nerve deficit.   Psychiatric:         Behavior: Behavior normal.         Thought Content: Thought content normal.         Procedures       CT Chest Pulmonary Embolism   Final Result   Right lower lobe pulmonary embolus which appears occlusive in the lower branches. There is no evidence of pulmonary infarct.      Dense groundglass infiltrate throughout the right lower lobe      Patchy groundglass infiltrates throughout the rest the lungs with diffuse interstitial prominence.      There is no evidence of right heart enlargement      I discussed these results with Dr. Naranjo at the time of this dictation      Signer Name: Kevin Barrios MD    Signed: 12/9/2021 4:09 AM    Workstation Name: [employer]     [facility]  [place]      XR Chest 1 View   Final Result   Low inspiratory volumes with probable right lower lobe infiltrate or atelectasis      Signer Name: Kevin Barrios MD    Signed: 12/9/2021 3:26 AM    Workstation Name: [email or web address]     [facility] [place]            ED Course  ED Course as of 12/09/21 1857   Thu Dec 09, 2021   0235 EKG: Sinus tachycardia, rate 128, , QRS 90, QTc 432, no STEMI. [DH]   0454 No beds open, will have to board pending availability in the morning. [DH]   0650 Patient care transferred to Dr. Coleman at change of shift.    Francisco Naranjo MD  6:50 AM EST [DH]   1133 I assumed care of this patient at shift change.  Currently awaiting on multiple telemetry discharges for admission. [SF]   1535 I spoke with the hospitalist team and they requested a stat echocardiogram to evaluate ventricle function and rule out possible thrombus.  They note they will accept the patient to their PCU once echocardiogram resulted.  They requested repeat troponin.  Admission plan pending. [SF]   1538 Rhythm change was noted during ER course.  Repeat EKG noted atrial flutter with variable AV block.  Rate 133 bpm.  QTc 4 to 49 ms.  No acute ST elevation noted.  On rhythm monitoring, discernible P waves are noted intermittently.  Patient will receive Cardizem 5 mg IV x1. [SF]   1542 CBC notes significant leukocytosis.  Concerns for sepsis.  CT PE study confirmed pneumonia with segmental PE with no obvious signs of right heart enlargement.  Patient placed on IV antibiotic therapy and started on heparin drip.  UDS positive for methamphetamines.  Trace bacteria found in urine as well.  Coagulation studies being monitored at this point in time.  Patient is placed on 2 L nasal cannula for supplemental oxygenation as comfort.  Slightly elevated BNP noted at 4100. [SF]   1703 Please note, sudden rhythm change was noted.  Repeat EKG noted atrial flutter with rapid ventricular response.  Patient received  initial push of Cardizem 5 mg IV x1.  Minimal improvement in heart rate was noted.  Patient was started on a Cardizem drip and hospitalist team was notified of this change [SF]   1856 Patient was accepted to hospitalist team. [SF]      ED Course User Index  [DH] Francisco Naranjo MD  [SF] Fernando Coleman,                                                  MDM  Number of Diagnoses or Management Options     Amount and/or Complexity of Data Reviewed  Clinical lab tests: ordered and reviewed  Tests in the radiology section of CPT®: ordered and reviewed  Independent visualization of images, tracings, or specimens: yes    Critical Care  Total time providing critical care: 30-74 minutes (33)      Final diagnoses:   Single subsegmental pulmonary embolism without acute cor pulmonale (HCC)   Pneumonia due to infectious organism, unspecified laterality, unspecified part of lung   Sepsis, due to unspecified organism, unspecified whether acute organ dysfunction present (HCC)       ED Disposition  ED Disposition     ED Disposition Condition Comment    Decision to Admit            No follow-up provider specified.       Medication List      No changes were made to your prescriptions during this visit.          Fernando Coleman DO  12/09/21 4396

## 2021-12-10 NOTE — PLAN OF CARE
Goal Outcome Evaluation:  Plan of Care Reviewed With: patient        Progress: no change  Outcome Summary: Patient A&Ox4, on 2L NC. She is currently on 5mg/hr of cardizem with heparin gtt infusing. She c/o constipation, suppository administered, pt not noted to be impacted at time of suppository administration.Patient currently up to BSC at this time attempting to have BM.

## 2021-12-10 NOTE — PROGRESS NOTES
Hospitalist Update:     Notified by cardiologist Dr Toussaint that patient has severe LV dysfunction. Will stop maintenance IV fluids. Patient is still tachycardic despite maxed out IV cardizem drip; Dr Toussaint suspects cardiomyopathy could be driving tachycardia, along with severe sepsis and meth in her system. Recommends IV amidarone bolus followed by initiation of drip per protocol, along with dose of IV digoxin. Will keep NPO after midnight for possible cardioversion. Appreciate cardiology assistance in patient's management.

## 2021-12-10 NOTE — CONSULTS
Cardiology  CONSULT NOTE    Consults    Patient Identification:  Name:  Tiera Mendoza  Age:  46 y.o.  Sex:  female  :  1975  MRN:  [id number]  Visit Number:  [id number]  Primary care provider:  Alan Martin APRN    Subjective       Reason for the consult:  Atrial fibrillation with RVR and dilated cardiomyopathy    Chief complaints:  Progressive shortness of breath and palpitations      History of presenting illness:    46-year-old woman has been admitted with chief complaint of progressive shortness of breath for the last 3 months.  Now gets on minimal exertion and at rest.  Associated with bilateral pedal edema.  Chest x-ray showed bibasilar pneumonia.  proBNP level was more than 4000.    She is been admitted with a diagnosis of acute hypoxic respiratory failure bilateral pneumonia.  CT of the chest also showed evidence of PE in the right lower lobe.    Echocardiogram done yesterday showed dilated left ventricle with LVEF less than 20%.  Left atrium is enlarged.  No significant valvular heart disease.  Patient has no previous history of CHF.  Patient has history suggestive of respiratory tract infection of unknown for the last few weeks.  No history of alcohol intake.  She denied history of IV drug abuse.  History of methamphetamine use was seen in H&P of last admission.  No history of thyroid disorder.  She is negative for COVID-19 and influenza.    On arrival to ED, ECG showed atrial fibrillation with RVR.  ECG done last week showed sinus tachycardia.  Patient developed atrial fibrillation in between.  She has previous history of atrial fibrillation diagnosed in  and remained arrhythmia free since then.  She was treated with amiodarone IV and IV Cardizem.  Amiodarone IV was stopped because of increased ALT and AST.  At present, she is on IV Cardizem 15 mg/h.  Ventricular rate is 100-110 bpm.  Hemodynamically stable.    No previous history of CAD or CHF.  No history of hypertension, DM or lipid  "disorder.  She carries history of bronchial asthma and uses bronchodilator nebulizer and inhalers.      --------------------------------------------------------------------------------------------------------------------  Review of Systems:     I reviewed all the organ systems.  Essentially as above.    ---------------------------------------------------------------------------------------------------------------------   Past History:  History reviewed. No pertinent family history.  Past Medical History:   Diagnosis Date   • Asthma    • Diabetes mellitus (HCC)     GDM last pregnancy   • History of atrial fibrillation    • Substance abuse (HCC)      Past Surgical History:   Procedure Laterality Date   •  SECTION      x 3   • HIP FRACTURE SURGERY  2017     Social History     Socioeconomic History   • Marital status:    Tobacco Use   • Smoking status: Never Smoker   • Smokeless tobacco: Current User     Types: Chew   Vaping Use   • Vaping Use: Never used   Substance and Sexual Activity   • Alcohol use: No   • Drug use: Yes     Types: Hydrocodone, Codeine, Methamphetamines     Comment: \"stopped around 6 weeks pregnant\"   • Sexual activity: Defer     ---------------------------------------------------------------------------------------------------------------------   Allergies:  Patient has no known allergies.  ---------------------------------------------------------------------------------------------------------------------     [facility] Meds:  budesonide-formoterol, 2 puff, Inhalation, BID - RT  doxycycline, 100 mg, Intravenous, Q12H  furosemide, 20 mg, Intravenous, Daily  ipratropium, 0.5 mg, Nebulization, 4x Daily - RT  methylPREDNISolone sodium succinate, 40 mg, Intravenous, Q12H  metoprolol tartrate, 50 mg, Oral, Q12H  montelukast, 10 mg, Oral, Nightly  piperacillin-tazobactam, 3.375 g, Intravenous, Q8H  sodium chloride, 10 mL, Intravenous, Q12H      dilTIAZem, 5-15 mg/hr, Last Rate: 5 mg/hr " (12/10/21 1558)  heparin (porcine), 7.87 Units/kg/hr, Last Rate: 17.87 Units/kg/hr (12/10/21 1007)  Pharmacy to Dose Zosyn,   Pharmacy to Dose Zosyn,       ---------------------------------------------------------------------------------------------------------------------     Objective     Vital Signs:  Temp:  [97.8 °F (36.6 °C)-98.6 °F (37 °C)] 98.6 °F (37 °C)  Heart Rate:  [101-144] 101  Resp:  [15-28] 20  BP: ()/() 124/81      12/09/21  0155 12/09/21  2001 12/10/21  0500   Weight: 127 kg (280 lb) 131 kg (288 lb 8 oz) 131 kg (288 lb 12.8 oz)     Body mass index is 43.91 kg/m².  ---------------------------------------------------------------------------------------------------------------------   Physical exam:      General Appearance:    Alert, cooperative, in no acute distress   Head:    Normocephalic, without obvious abnormality, atraumatic   Eyes:            Lids and lashes normal, conjunctivae and sclerae normal, no   icterus, no pallor, corneas clear, PERRLA   Ears:    Ears appear intact with no abnormalities noted   Throat:   No oral lesions, no thrush, oral mucosa moist   Neck:   No adenopathy, supple, trachea midline, no thyromegaly, no   carotid bruit, no JVD   Back:     No significant abnormality   Lungs:    Bilateral equal air entry.  Bilateral extensive wheezes were heard.    Heart:   Tachycardia.  Irregular rhythm.  No murmurs.   Chest Wall:    No abnormalities observed   Abdomen:     Normal bowel sounds, no masses, no organomegaly, soft    non-tender, non-distended, no guarding, no rebound                tenderness       Extremities:  2+ bilateral pedal edema.   Pulses:   Pulses palpable and equal bilaterally   Skin:   No bleeding, bruising or rash       Neurologic: No focal deficits         Telemetry:  A. fib.  Ventricular rate-in 100s one.    ---------------------------------------------------------------------------------------------------------------------   ECG:   ECG/EMG Results  (last 24 hours)     Procedure Component Value Units Date/Time    ECG 12 Lead [[id number]] Collected: 12/09/21 0232     Updated: 12/09/21 2029     QT Interval 296 ms      QTC Interval 432 ms     Narrative:      Test Reason : soa  Blood Pressure :   */*   mmHG  Vent. Rate : 128 BPM     Atrial Rate : 128 BPM     P-R Int : 162 ms          QRS Dur :  90 ms      QT Int : 296 ms       P-R-T Axes :  81  63  47 degrees     QTc Int : 432 ms    Sinus tachycardia with premature atrial complexes with aberrant conduction  Low voltage QRS  Septal infarct , age undetermined  Abnormal ECG  When compared with ECG of 02-DEC-2020 12:28,  aberrant conduction is now present  Septal infarct is now present  Confirmed by Clay Toussaint ([id number]) on 12/9/2021 8:29:13 PM    Referred By: NICOLAS           Confirmed By: Clay Toussaint    ECG 12 Lead [[id number]] Collected: 12/09/21 1537     Updated: 12/09/21 2038     QT Interval 302 ms      QTC Interval 449 ms     Narrative:      Test Reason : Rhythm Change  Blood Pressure :   */*   mmHG  Vent. Rate : 133 BPM     Atrial Rate : 374 BPM     P-R Int :   * ms          QRS Dur :  92 ms      QT Int : 302 ms       P-R-T Axes :   *  62  62 degrees     QTc Int : 449 ms    Atrial flutter with variable AV block  Low voltage QRS  Cannot rule out Anterior infarct (cited on or before 09-DEC-2021)  Abnormal ECG  When compared with ECG of 09-DEC-2021 02:32, (Unconfirmed)  Atrial flutter has replaced Sinus rhythm  Confirmed by Clay Toussaint ([id number]) on 12/9/2021 8:37:18 PM    Referred By: RUDY           Confirmed By: Clay Toussaint    Adult Transthoracic Echo Complete W/ Cont if Necessary Per Protocol [[id number]] Collected: 12/09/21 2004     Updated: 12/09/21 2045     BSA 2.3 m^2      IVSd 1.1 cm      LVIDd 6.1 cm      LVIDs 5.4 cm      LVPWd 0.9 cm      IVS/LVPW 1.2     FS 11.5 %      EDV(Teich) 186.9 ml      ESV(Teich) 141.3 ml      EF(Teich) 24.4 %      EDV(cubed) 227.0 ml      ESV(cubed) 157.5 ml      EF(cubed) 30.6 %       LV mass(C)d 253.9 grams      LV mass(C)dI 112.5 grams/m^2      SV(Teich) 45.6 ml      SI(Teich) 20.2 ml/m^2      SV(cubed) 69.5 ml      SI(cubed) 30.8 ml/m^2      Ao root diam 3.3 cm      Ao root area 8.6 cm^2      LA dimension 4.8 cm      LA/Ao 1.5     LVOT diam 2.0 cm      LVOT area 3.1 cm^2      LVOT area(traced) 3.1 cm^2      LVLd ap4 5.7 cm      EDV(MOD-sp4) 78.7 ml      LVLs ap4 5.6 cm      ESV(MOD-sp4) 56.5 ml      EF(MOD-sp4) 28.2 %      SV(MOD-sp4) 22.2 ml      SI(MOD-sp4) 9.8 ml/m^2      Ao root area (BSA corrected) 1.5     LV Santamaria Vol (BSA corrected) 34.9 ml/m^2      LV Sys Vol (BSA corrected) 25.0 ml/m^2      PA acc time 0.06 sec      TR max lulu 243.0 cm/sec      RVSP(TR) 33.6 mmHg      RAP systole 10.0 mmHg      PA pr(Accel) 54.3 mmHg       CV ECHO TAYLOR - BZI_BMI 48.1 kilograms/m^2       CV ECHO TAYLOR - BSA(HAYCOCK) 2.5 m^2       CV ECHO TAYLOR - BZI_METRIC_WEIGHT 127.0 kg       CV ECHO TAYLOR - BZI_METRIC_HEIGHT 162.6 cm      Target HR (85%) 149 bpm      Max. Pred. HR (100%) 175 bpm     Narrative:      · The left ventricular cavity is moderately dilated.  · Left ventricular ejection fraction appears to be less than 20%. Left   ventricular systolic function is severely decreased.  · The aortic valve is structurally normal with no regurgitation or   stenosis present.  · The mitral valve is grossly normal in structure. Mild to moderate mitral   valve regurgitation is present. No significant mitral valve stenosis is   present.  · There is no evidence of pericardial effusion.       ECG 12 Lead [[id number]] Collected: 12/09/21 2120     Updated: 12/10/21 1256     QT Interval 310 ms      QTC Interval 450 ms     Narrative:      Test Reason : Amiodarone Protocol  Blood Pressure :   */*   mmHG  Vent. Rate : 127 BPM     Atrial Rate : 122 BPM     P-R Int :   * ms          QRS Dur :  90 ms      QT Int : 310 ms       P-R-T Axes :   *  59 -21 degrees     QTc Int : 450 ms    Atrial fibrillation with rapid  ventricular response  Low voltage QRS  Possible Anterolateral infarct (cited on or before 09-DEC-2021)  Abnormal ECG  When compared with ECG of 09-DEC-2021 15:37,  Atrial fibrillation has replaced Atrial flutter  Confirmed by Clay Toussaint ([id number]) on 12/10/2021 12:56:36 PM    Referred By: JULIA           Confirmed By: Clay Toussaint    ECG 12 Lead [[id number]] Collected: 12/10/21 0613     Updated: 12/10/21 1342     QT Interval 320 ms      QTC Interval 458 ms     Narrative:      Test Reason : Amiodarone Protocol  Blood Pressure :   */*   mmHG  Vent. Rate : 123 BPM     Atrial Rate : 115 BPM     P-R Int :   * ms          QRS Dur :  96 ms      QT Int : 320 ms       P-R-T Axes :   *  83  14 degrees     QTc Int : 458 ms    Atrial fibrillation with rapid ventricular response  Abnormal ECG  When compared with ECG of 09-DEC-2021 21:20, (Unconfirmed)  No significant change was found  Confirmed by Clay Toussaint ([id number]) on 12/10/2021 1:42:12 PM    Referred By: JULIA           Confirmed By: Clay Toussaint          ---------------------------------------------------------------------------------------------------------------------   Results from last 7 days   Lab Units 12/10/21  0044 12/09/21 2026 12/09/21  1110 12/09/21  0426 12/09/21  0233   CRP mg/dL 30.87*  --   --   --  26.44*   LACTATE mmol/L  --  3.6*  --  3.6* 2.9*   WBC 10*3/mm3 19.78*  --   --   --  29.87*   HEMOGLOBIN g/dL 10.4*  --   --   --  10.5*   HEMATOCRIT % 34.7  --   --   --  33.6*   MCV fL 82.4  --   --   --  79.1   MCHC g/dL 30.0*  --   --   --  31.3*   PLATELETS 10*3/mm3 267  --   --   --  318   INR   --   --  1.66* 1.42*  --      Results from last 7 days   Lab Units 12/09/21  2129   PH, ARTERIAL pH units 7.423   PO2 ART mm Hg 88.4   PCO2, ARTERIAL mm Hg 33.3*   HCO3 ART mmol/L 21.8     Results from last 7 days   Lab Units 12/10/21  0044 12/09/21  0233   SODIUM mmol/L 131* 130*   POTASSIUM mmol/L 4.4 4.3   MAGNESIUM mg/dL 2.1  --    CHLORIDE mmol/L 98 93*    CO2 mmol/L 16.5* 24.0   BUN mg/dL 23* 22*   CREATININE mg/dL 1.05* 1.05*   EGFR IF NONAFRICN AM mL/min/1.73 57* 57*   CALCIUM mg/dL 8.4* 8.7   GLUCOSE mg/dL 198* 136*   ALBUMIN g/dL 2.80* 3.31*   BILIRUBIN mg/dL 3.1* 2.8*   ALK PHOS U/L 180* 113   AST (SGOT) U/L 242* 134*   ALT (SGPT) U/L 222* 191*   Estimated Creatinine Clearance: 96.9 mL/min (A) (by C-G formula based on SCr of 1.05 mg/dL (H)).  No results found for: AMMONIA  Results from last 7 days   Lab Units 12/10/21  0303 12/09/21  2132 12/09/21  1636   TROPONIN T ng/mL <0.010 <0.010 <0.010     Results from last 7 days   Lab Units 12/09/21  0233   PROBNP pg/mL 4,147.0*     Lab Results   Component Value Date    HGBA1C 6.20 (H) 12/09/2021     Lab Results   Component Value Date    TSH 2.837 07/08/2018    FREET4 1.00 07/08/2018     Lab Results   Component Value Date    PREGTESTUR Negative 12/03/2020     Pain Management Panel     Pain Management Panel Latest Ref Rng & Units 12/9/2021 12/3/2020    AMPHETAMINES SCREEN, URINE Negative Positive(A) Positive(A)    BARBITURATES SCREEN Negative Negative Negative    BENZODIAZEPINE SCREEN, URINE Negative Negative Negative    BUPRENORPHINEUR Negative Negative Negative    COCAINE SCREEN, URINE Negative Negative Negative    METHADONE SCREEN, URINE Negative Negative Negative    METHAMPHETAMINEUR Negative Positive(A) -        Blood Culture   Date Value Ref Range Status   12/09/2021 No growth at 24 hours  Preliminary   12/09/2021 No growth at 24 hours  Preliminary     No results found for: URINECX  No results found for: WOUNDCX  No results found for: STOOLCX      ---------------------------------------------------------------------------------------------------------------------   Radiology:    No results found for this or any previous visit.      No results found for this or any previous visit.      Results for orders placed during the hospital encounter of 12/09/21    XR Chest 1 View    Narrative  CR Chest 1  Vw    INDICATION:  Shortness during cough, follow pneumonia    COMPARISON:  220    FINDINGS:  Portable AP view(s) of the chest. There Are low lung volumes with mild right base atelectasis or infiltrate. Left lung appears clear. Heart size normal. Bones are    Impression  Low inspiratory volumes with probable right lower lobe infiltrate or atelectasis    Signer Name: Kevin Barrios MD  Signed: 12/9/2021 3:26 AM  Workstation Name: [facility]      Results for orders placed during the hospital encounter of 12/09/21    CT Chest Pulmonary Embolism    Narrative  CT CHEST PULMONARY EMBOLISM W CONTRAST    INDICATION:  Sepsis and recurrent pneumonia    TECHNIQUE:  CT angiogram of the chest with IV contrast. 3-D reconstructions were obtained and reviewed.   Radiation dose reduction techniques included automated exposure control or exposure modulation based on body size. Count of known CT and cardiac nuc med studies  performed in previous 12 months: 0.    COMPARISON:  None available.    FINDINGS:  There is adequate opacification of pulmonary arteries. There is right lower lobe pulmonary embolus which is occlusive bases. There is dense consolidation throughout the right lower lobe. There is mild interstitial prominence throughout the lungs there  are some patchy areas of groundglass density in the upper lobes. Aorta is normal in size. There is a small right effusion. There is no adenopathy.    Impression  Right lower lobe pulmonary embolus which appears occlusive in the lower branches. There is no evidence of pulmonary infarct.    Dense groundglass infiltrate throughout the right lower lobe    Patchy groundglass infiltrates throughout the rest the lungs with diffuse interstitial prominence.    There is no evidence of right heart enlargement    I discussed these results with Dr. Naranjo at the time of this dictation    Signer Name: Kevin Barrios MD  Signed: 12/9/2021 4:09 AM  Workstation Name:  [email or web address]  [facility]      I have personally reviewed the radiology images and read the final radiology report.        Assessment      1.  Paroxysmal atrial fibrillation with RVR.  2.  Acute systolic heart failure due to dilated cardiomyopathy with ejection fraction less than 20%.  Etiology-unclear.  Suspecting viral illness because of history of recurrent respiratory illness in the past few weeks and RV dilatation and systolic dysfunction also present.  Cannot rule out methamphetamine used as there is such a history and ischemia.  Less likely tachycardia mediated because of recent onset of A. fib with RVR.  3.  Acute PE  4.  Acute hypoxic respiratory failure due to exacerbation of bronchial asthma and pneumonia.    Recommendations     1.  A. Fib  -IV amiodarone is discontinued because of increasing transaminase levels  -Started metoprolol tartrate 50 mg p.o. twice daily and will be changed to succinate due to HFrEF once ventricular is controlled  -Continue anticoagulation with IV heparin infusion for now for stroke prevention and also for history of PE  -If A. fib persists, will plan for SON guided cardioversion on Monday.    2.  HFrEF-  -started Lasix 20 mg IV daily and will monitor the response to adjust the dose and frequency.  -Will start Entresto and then spironolactone if BP stable tomorrow  -She needs LifeVest upon discharge.  May consider cardiac MRI as outpatient if no definite cause of HFrEF is found.    I discussed findings and plan of care with the patient.    Thank you for the opportunity to participate in the care of your patient. Please do not hesitate to call with any questions or concerns.     Leia Roth MD, [facility],  12/10/21  16:37 EST

## 2021-12-10 NOTE — PAYOR COMM NOTE
"  [facility]  NPI: [id number]    Utilization Review   Contact:Patti Saavedra MSN, APRN, NP-C  Phone: [phone]  Fax: [phone]    carmine bh/Attn: mónica Maurer Req  REF:[id number]  DX: [phone]  Francisco Mendoza (46 y.o. Female)             Date of Birth Social Security Number Address Home Phone MRN    1975  [id number] [facility] [id number] [phone] [id number]    Hindu Marital Status             None        Admission Date Admission Type Admitting Provider Attending Provider Department, Room/Bed    12/9/21 Emergency Gabriele Bo MD Heinss, Karl F, MD [facility] PROGRESS CARE, P206/1P    Discharge Date Discharge Disposition Discharge Destination                         Attending Provider: Natanael Toscano MD    Allergies: No Known Allergies    Isolation: None   Infection: None   Code Status: CPR   Advance Care Planning Activity    Ht: 172.7 cm (68\")   Wt: 131 kg (288 lb 12.8 oz)    Admission Cmt: None   Principal Problem: None                Active Insurance as of 12/9/2021     Primary Coverage     Payor Plan Insurance Group [place]er/Plan Group    [facility] [place] [facility] [place]      Payor Plan Address Payor Plan Phone Number Payor Plan Fax Number Effective Dates    [address]   1/1/2014 - None Entered    [facility] [id number]       Subscriber Name Subscriber Birth Date Member ID       FRANCISCO MENDOZA 1975 [id number]                 Emergency Contacts      (Rel.) Home Phone Work Phone Mobile Phone    philippe beauchamp (Other) [phone] -- --               History & Physical      Sarina Arzate APRN at 12/09/21 1601     Attestation signed by Gabriele Bo MD at 12/09/21 2026    I have seen and examined the patient independently from PEDRO Sullivan, and have reviewed this documentation. Agree with treatment of severe sepsis secondary to pneumonia (community acquired vs aspiration). " IV zosyn and doxycycline on board. Respiratory PCR panel and COVID negative. She has documented history of asthma and is wheezing on my exam so will add IV solu-medrol as well. Continue atrovent nebs, avoid albuterol in light of afib w/RVR. Currently HR uncontrolled, cardizem drip at 10cc/hr, increasing to 15cc/hr now. Heparin drip on board which will double as both stroke prophylaxis in light of afib and as treatment of right lower lobe PE. Venous doppler of lower extremities ordered to rule out PE. She reports increased swelling in legs, but primarily only in left foot/lower leg. STAT ECHO ordered by day-time hospitalist, results pending. Will hold off on further IV fluids until results back.                       [facility]  History & Physical          Patient Identification:  Name:  Tiera Mendoza  Age:  46 y.o.  Sex:  female  :  1975  MRN:  [id number]   Admit Date: 2021   Visit Number:  [id number]  Primary Care Physician:  Alan Martin APRN    I have seen the patient in conjunction with PEDRO Sullivan and I agree with the following statements:     Subjective     Chief complaint: short of breath     History of presenting illness:    Mrs. Mendoza is a 46 year old female patient who presented to [facility] ED on 2021 due to shortness of breath.  She states that she has been diagnosed with pneumonia around 10 days ago by her PCP, she was started on doxycycline and a Medrol Dosepak.  She states 5 days into this she was feeling better and then started feeling bad again.  He did complete 9 days of this.  She denies any fever at any time.  Does report getting choked on food it feels like it gets stuck in her throat, she states this is been going on for 5 years.  She denies any history of blood clots, she denies any recent surgeries, no prolonged sitting/traveling, no history of sleep apnea, she does not take hormone replacement therapy and is not on birth  control.  She denies any known family history of blood clots.    Her treatment in the ER included Tylenol 1 g p.o., Rocephin 2 g IV x1, Decadron 6 mg IV x1, Zosyn 3.375 g IV x1, she has had a total of 3 L of normal saline. She was also started on a heparin drip with bolus. Her v/s in the ER were temperature 98.5, heart rate 120 3130, respirations 24, blood pressure 105/83, saturation 97 to 98%.Negative HCG and hx of tubal.     Her past medical history is significant for gestational diabetes, asthma, history of A. Fib (states this was found in  when she had a child that , has never been on anticoagulation, he states when she had another child in 2017 she was not told she was in A. fib at that time) history of substance abuse, she states she has recently done meth, it has been 2 months, she denies any current or past use of IV drugs.  He denies any use of tobacco.  She denies any alcohol use.  She denies any history of cardiac stenting, no history of peripheral vascular disease, she denies any history of heart failure. She is still legally  to her  David but they are currently , she does have an adult daughter Aisha.  No family is present on my exam.      ---------------------------------------------------------------------------------------------------------------------   Review of Systems   Constitutional: Negative for chills, diaphoresis, fatigue and fever.   Respiratory: Positive for shortness of breath.    Cardiovascular: Negative for chest pain.   Gastrointestinal: Negative for constipation, diarrhea, nausea and vomiting.   Genitourinary: Negative for difficulty urinating.   Musculoskeletal: Negative for arthralgias and myalgias.   Skin: Negative for color change.   Neurological: Negative for dizziness, weakness and light-headedness.   Psychiatric/Behavioral: Negative for agitation, behavioral problems and confusion.     "  ---------------------------------------------------------------------------------------------------------------------   Past Medical History:   Diagnosis Date   • Asthma    • Diabetes mellitus (CMS/[facility])     GDM last pregnancy   • History of atrial fibrillation    • Substance abuse (CMS/[facility])      Past Surgical History:   Procedure Laterality Date   •  SECTION      x 3   • HIP FRACTURE SURGERY  2017     No family history on file.  Social History     Socioeconomic History   • Marital status:    Tobacco Use   • Smoking status: Never Smoker   • Smokeless tobacco: Current User     Types: Chew   Vaping Use   • Vaping Use: Never used   Substance and Sexual Activity   • Alcohol use: No   • Drug use: Yes     Types: Hydrocodone, Codeine     Comment: \"stopped around 6 weeks pregnant\"   • Sexual activity: Defer     ---------------------------------------------------------------------------------------------------------------------   Allergies:  Patient has no known allergies.  ---------------------------------------------------------------------------------------------------------------------     Medications below are reported home medications pulling from within the system; at this time, these medications have not been reconciled unless otherwise specified and are in the verification process for further verifcation as current home medications.    Prior to Admission Medications     Prescriptions Last Dose Informant Patient Reported? Taking?    albuterol (PROVENTIL HFA;VENTOLIN HFA) 108 (90 BASE) MCG/ACT inhaler 2021 Self Yes Yes    Inhale 2 puffs Every 4 (Four) Hours As Needed for Wheezing or Shortness of Air.    albuterol (PROVENTIL) (2.5 MG/3ML) 0.083% nebulizer solution 2021 Self Yes Yes    Take 2.5 mg by nebulization Every 4 (Four) Hours As Needed for Wheezing or Shortness of Air.    budesonide-formoterol (SYMBICORT) 160-4.5 MCG/ACT inhaler 2021 Self Yes Yes    Inhale 2 puffs 2 (Two) " Times a Day.    montelukast (SINGULAIR) 10 MG tablet 12/8/2021 Self Yes Yes    Take 10 mg by mouth Every Night.        Hospital Scheduled Meds:  budesonide-formoterol, 2 puff, Inhalation, BID - RT  montelukast, 10 mg, Oral, Nightly  piperacillin-tazobactam, 3.375 g, Intravenous, Once  piperacillin-tazobactam, 3.375 g, Intravenous, Q8H      heparin (porcine), 7.87 Units/kg/hr, Last Rate: 11.87 Units/kg/hr (12/09/21 1136)  Pharmacy to Dose Zosyn,   sodium chloride, 125 mL/hr, Last Rate: Stopped (12/09/21 0717)      ---------------------------------------------------------------------------------------------------------------------   Objective       Vital Signs:  Temp:  [98.5 °F (36.9 °C)] 98.5 °F (36.9 °C)  Heart Rate:  [110-131] 110  Resp:  [22-24] 24  BP: (105-135)/() 122/86      12/09/21  0155   Weight: 127 kg (280 lb)     Body mass index is 48.06 kg/m².  ---------------------------------------------------------------------------------------------------------------------       Physical Exam    Physical Exam:  Constitutional: Obese female lying on ER stretcher on 2 L nasal cannula saturating 98%  HENT:  Head: Normocephalic and atraumatic.  Mouth:  Moist mucous membranes.    Eyes:  Conjunctivae and EOM are normal.  Pupils are equal, round, and reactive to light.  No scleral icterus.  Neck:  Neck supple.  No JVD present.    Cardiovascular:  Normal rate, regular rhythm.  with no murmur.  Pulmonary/Chest: Does get dyspneic while talking, maintain saturation, is in no acute distress, bilateral lower lobes with crackles  Abdominal:  Soft.  Bowel sounds are present.  No distension and no tenderness.   Musculoskeletal:  No edema, no tenderness, and no deformity.    Neurological:  Alert and oriented to person, place, and time.  No tongue deviation.  No facial droop.  No slurred speech.   Skin:  Skin is warm and dry.  No rash noted.  No pallor.   Psychiatric:  Normal mood and affect.  Behavior is normal.  Judgment and  thought content normal.   Peripheral vascular:  No edema and strong pulses on all 4 extremities.  ---------------------------------------------------------------------------------------------------------------------  EKG:         ---------------------------------------------------------------------------------------------------------------------   Results from last 7 days   Lab Units 12/09/21  1110 12/09/21  0426 12/09/21  0233   CRP mg/dL  --   --  26.44*   LACTATE mmol/L  --  3.6* 2.9*   WBC 10*3/mm3  --   --  29.87*   HEMOGLOBIN g/dL  --   --  10.5*   HEMATOCRIT %  --   --  33.6*   MCV fL  --   --  79.1   MCHC g/dL  --   --  31.3*   PLATELETS 10*3/mm3  --   --  318   INR  1.66* 1.42*  --          Results from last 7 days   Lab Units 12/09/21  0233   SODIUM mmol/L 130*   POTASSIUM mmol/L 4.3   CHLORIDE mmol/L 93*   CO2 mmol/L 24.0   BUN mg/dL 22*   CREATININE mg/dL 1.05*   EGFR IF NONAFRICN AM mL/min/1.73 57*   CALCIUM mg/dL 8.7   GLUCOSE mg/dL 136*   ALBUMIN g/dL 3.31*   BILIRUBIN mg/dL 2.8*   ALK PHOS U/L 113   AST (SGOT) U/L 134*   ALT (SGPT) U/L 191*   Estimated Creatinine Clearance: 89.3 mL/min (A) (by C-G formula based on SCr of 1.05 mg/dL (H)).  No results found for: AMMONIA  Results from last 7 days   Lab Units 12/09/21  0233   TROPONIN T ng/mL 0.016     Results from last 7 days   Lab Units 12/09/21  0233   PROBNP pg/mL 4,147.0*     Lab Results   Component Value Date    HGBA1C 5.70 (H) 12/03/2020     Lab Results   Component Value Date    TSH 2.837 07/08/2018    FREET4 1.00 07/08/2018     Lab Results   Component Value Date    PREGTESTUR Negative 12/03/2020     Pain Management Panel     Pain Management Panel Latest Ref Rng & Units 12/9/2021 12/3/2020    AMPHETAMINES SCREEN, URINE Negative Positive(A) Positive(A)    BARBITURATES SCREEN Negative Negative Negative    BENZODIAZEPINE SCREEN, URINE Negative Negative Negative    BUPRENORPHINEUR Negative Negative Negative    COCAINE SCREEN, URINE Negative Negative  Negative    METHADONE SCREEN, URINE Negative Negative Negative    METHAMPHETAMINEUR Negative Positive(A) -        No results found for: BLOODCX  No results found for: URINECX  No results found for: WOUNDCX  No results found for: STOOLCX      ---------------------------------------------------------------------------------------------------------------------  Imaging Results (Last 7 Days)     Procedure Component Value Units Date/Time    CT Chest Pulmonary Embolism [[id number]] Collected: 12/09/21 0409     Updated: 12/09/21 0411    Narrative:      CT CHEST PULMONARY EMBOLISM W CONTRAST    INDICATION:   Sepsis and recurrent pneumonia    TECHNIQUE:   CT angiogram of the chest with IV contrast. 3-D reconstructions were obtained and reviewed.   Radiation dose reduction techniques included automated exposure control or exposure modulation based on body size. Count of known CT and cardiac nuc med studies  performed in previous 12 months: 0.     COMPARISON:   None available.    FINDINGS:   There is adequate opacification of pulmonary arteries. There is right lower lobe pulmonary embolus which is occlusive bases. There is dense consolidation throughout the right lower lobe. There is mild interstitial prominence throughout the lungs there  are some patchy areas of groundglass density in the upper lobes. Aorta is normal in size. There is a small right effusion. There is no adenopathy.          Impression:      Right lower lobe pulmonary embolus which appears occlusive in the lower branches. There is no evidence of pulmonary infarct.    Dense groundglass infiltrate throughout the right lower lobe    Patchy groundglass infiltrates throughout the rest the lungs with diffuse interstitial prominence.    There is no evidence of right heart enlargement    I discussed these results with Dr. Naranjo at the time of this dictation    Signer Name: Kevin Barrios MD   Signed: 12/9/2021 4:09 AM   Workstation Name: [email or web address]    [facility]    XR Chest 1 View [[id number]] Collected: 12/09/21 0326     Updated: 12/09/21 0328    Narrative:      CR Chest 1 Vw    INDICATION:   Shortness during cough, follow pneumonia     COMPARISON:    220    FINDINGS:  Portable AP view(s) of the chest. There Are low lung volumes with mild right base atelectasis or infiltrate. Left lung appears clear. Heart size normal. Bones are      Impression:      Low inspiratory volumes with probable right lower lobe infiltrate or atelectasis    Signer Name: Kevin Barrios MD   Signed: 12/9/2021 3:26 AM   Workstation Name: [email or web address]    [facility]          ---------------------------------------------------------------------------------------------------------------------  Assessment / Plan       Assessment and Plan:      Severe Sepsis (lactic 3.6, CRP 26.44, WBC 29.87, HR >90, RR >22)  CAP vs Aspiration   -2 sets of blood cultures collected in the ER  -COVID-19 and influenza negative on admission   -Respiratory panel PCR pending  -Chest x-ray admission today showed low inspiratory volumes with probable right lower lobe filtrate atelectasis.  -CT of the chest with PE protocol on admission today showed dense groundglass infiltrate throughout the right lower lobe, patchy groundglass infiltrates throughout the rest of the lungs with diffuse interstitial prominence  -has been on medrol dose pack and 10 days of doxycyline at home prior to admission  -Zosyn and Rocephin given in the ER  -Had 3 liters of NS In the ER  -Continue Zosyn and doxycycline  -Pulmonary consulted  -Atrovent nebs holding albuterol given her tachycardia  -SLP evaluation ordered  -Aspiration precautions   -Incentive spirometer  -Last lactic was 3.6, repeat ordered  -Repeat labs in the am    RLL PE   -CT the chest with PE protocol showed a right lower lobe PE which appears occlusive in the lower branches, no evidence of pulmonary infarct.  -She was started on a heparin drip  with bolus in the ER per protocol, continue on arrival to the floor   -STAT echo pending    Elevated proBNP  -BP admissions 4147  -She denies any history of heart failure, likely 2/2 to above  -Stat echo pending    Hyperglycemia  -glucose is 136  -possibly related to recent steroid use   -A1c ordered     CELSO  Hyponatremia  -Sodium is 130 on admission  -Creatinine is 1.05  -baseline is around 0.7  -IV fluids given in the ER, monitor urine output   -Repeat labs in the am    Elevated transaminases  Hyperbilirubinemia  -  -  -Total bili 2.8  -INR on admission was 1.42, repeat was 1.66  -Acute Hepatitis panel ordered  -Repeat CMP in the morning    Possible Atrial Flutter with RVR  -has history of A-Fib listed not on asa or anticoagulation at home   -[id number] score 1 (as of now, awaiting echo)  -currently on heparin gtt for acute PE  -Cardizem gtt started in the ER  -Cardiology consulted  -continuous cardiac monitoring    Normocytic anemia  -H/H 10.5/33.6  -Repeat in the am     Illicit Drug Use  -UDS is positive for amphetamines   -patient denies any recent drug use, states the last time she used meth was around 2 months ago, she denies any use of IV Drugs    Hx of Asthma  -continue Singulair  -continue Symbicort     Morbid Obesity  -BMI 48.06  -affects all aspects of care    Diet: NPO, accu checks every 6 hours   Activity: bed rest  DVT prophylaxis: Heparin GTT    Code status: Full      Disposition home once clinically stable     Patient is high risk for the following reasons: Severe sepsis 2/2 CAP/Aspiration PNA, Acute PE, Atrial Flutter with RVR    Sarina Arzate, PEDRO  12/09/21  16:02 EST  ---------------------------------------------------------------------------------------------------------------------       Electronically signed by Gabriele Bo MD at 12/09/21 2026          Emergency Department Notes      Fernando Coleman DO at 12/09/21 0230          Subjective   46-year-old female  "presenting with cough and shortness of breath.  Symptoms have been ongoing for over a week.  She was seen by her PCP and started on doxycycline, just finished a 10-day course.  She states she initially had some improvement but for the last 5 days has worsened with increasing fatigue and dyspnea.  She is still having productive cough and intermittent low-grade fevers.  Denies chest pain.  Denies vomiting or diarrhea.  Denies recent travel or sick contacts including COVID-19.      History provided by:  Patient   used: No        Review of Systems   Constitutional: Positive for fatigue and fever.   HENT: Negative.    Eyes: Negative.    Respiratory: Positive for cough and shortness of breath.    Cardiovascular: Negative.  Negative for chest pain.   Gastrointestinal: Negative.  Negative for abdominal pain.   Genitourinary: Negative.  Negative for dysuria.   Musculoskeletal: Negative.    Skin: Negative.    Neurological: Negative.    Psychiatric/Behavioral: Negative.    All other systems reviewed and are negative.      Past Medical History:   Diagnosis Date   • Asthma    • Diabetes mellitus (CMS/HCC)     GDM last pregnancy   • History of atrial fibrillation    • Substance abuse (CMS/[facility])        No Known Allergies    Past Surgical History:   Procedure Laterality Date   •  SECTION      x 3   • HIP FRACTURE SURGERY  2017       No family history on file.    Social History     Socioeconomic History   • Marital status:    Tobacco Use   • Smoking status: Never Smoker   • Smokeless tobacco: Current User     Types: Chew   Vaping Use   • Vaping Use: Never used   Substance and Sexual Activity   • Alcohol use: No   • Drug use: Yes     Types: Hydrocodone, Codeine     Comment: \"stopped around 6 weeks pregnant\"   • Sexual activity: Defer           Objective   Physical Exam  Vitals and nursing note reviewed.   Constitutional:       General: She is not in acute distress.     Appearance: She is " well-developed. She is not diaphoretic.   HENT:      Head: Normocephalic and atraumatic.      Right Ear: External ear normal.      Left Ear: External ear normal.      Nose: Nose normal.   Eyes:      Conjunctiva/sclera: Conjunctivae normal.      Pupils: Pupils are equal, round, and reactive to light.   Neck:      Vascular: No JVD.      Trachea: No tracheal deviation.   Cardiovascular:      Rate and Rhythm: Regular rhythm. Tachycardia present.      Heart sounds: Normal heart sounds. No murmur heard.      Pulmonary:      Effort: Pulmonary effort is normal. No respiratory distress.      Breath sounds: Decreased breath sounds present. No wheezing.      Comments: Coarse  Abdominal:      General: Bowel sounds are normal.      Palpations: Abdomen is soft.      Tenderness: There is no abdominal tenderness.   Musculoskeletal:         General: No deformity. Normal range of motion.      Cervical back: Normal range of motion and neck supple.   Skin:     General: Skin is warm and dry.      Coloration: Skin is not pale.      Findings: No erythema or rash.   Neurological:      Mental Status: She is alert and oriented to person, place, and time.      Cranial Nerves: No cranial nerve deficit.   Psychiatric:         Behavior: Behavior normal.         Thought Content: Thought content normal.         Procedures      CT Chest Pulmonary Embolism   Final Result   Right lower lobe pulmonary embolus which appears occlusive in the lower branches. There is no evidence of pulmonary infarct.      Dense groundglass infiltrate throughout the right lower lobe      Patchy groundglass infiltrates throughout the rest the lungs with diffuse interstitial prominence.      There is no evidence of right heart enlargement      I discussed these results with Dr. Naranjo at the time of this dictation      Signer Name: Kevin Barrios MD    Signed: 12/9/2021 4:09 AM    Workstation Name: [facility]-     [facility] [place]      XR Chest 1 View   Final  Result   Low inspiratory volumes with probable right lower lobe infiltrate or atelectasis      Signer Name: Kevin Barrios MD    Signed: 12/9/2021 3:26 AM    Workstation Name: [facility]     [facility] [place]            ED Course  ED Course as of 12/09/21 1857   Thu Dec 09, 2021   0235 EKG: Sinus tachycardia, rate 128, , QRS 90, QTc 432, no STEMI. [DH]   0454 No beds open, will have to board pending availability in the morning. [DH]   0650 Patient care transferred to Dr. Coleman at change of shift.    Francisco Naranjo MD  6:50 AM EST [DH]   1133 I assumed care of this patient at shift change.  Currently awaiting on multiple telemetry discharges for admission. [SF]   1535 I spoke with the hospitalist team and they requested a stat echocardiogram to evaluate ventricle function and rule out possible thrombus.  They note they will accept the patient to their PCU once echocardiogram resulted.  They requested repeat troponin.  Admission plan pending. [SF]   1538 Rhythm change was noted during ER course.  Repeat EKG noted atrial flutter with variable AV block.  Rate 133 bpm.  QTc 4 to 49 ms.  No acute ST elevation noted.  On rhythm monitoring, discernible P waves are noted intermittently.  Patient will receive Cardizem 5 mg IV x1. [SF]   1542 CBC notes significant leukocytosis.  Concerns for sepsis.  CT PE study confirmed pneumonia with segmental PE with no obvious signs of right heart enlargement.  Patient placed on IV antibiotic therapy and started on heparin drip.  UDS positive for methamphetamines.  Trace bacteria found in urine as well.  Coagulation studies being monitored at this point in time.  Patient is placed on 2 L nasal cannula for supplemental oxygenation as comfort.  Slightly elevated BNP noted at 4100. [SF]   1703 Please note, sudden rhythm change was noted.  Repeat EKG noted atrial flutter with rapid ventricular response.  Patient received initial push of Cardizem 5 mg IV x1.   Minimal improvement in heart rate was noted.  Patient was started on a Cardizem drip and hospitalist team was notified of this change [SF]   1856 Patient was accepted to hospitalist team. [SF]      ED Course User Index  [DH] Farncisco Naranjo MD  [SF] Fernando Coleman DO                                                 MDM  Number of Diagnoses or Management Options     Amount and/or Complexity of Data Reviewed  Clinical lab tests: ordered and reviewed  Tests in the radiology section of CPT®: ordered and reviewed  Independent visualization of images, tracings, or specimens: yes    Critical Care  Total time providing critical care: 30-74 minutes (33)      Final diagnoses:   Single subsegmental pulmonary embolism without acute cor pulmonale (HCC)   Pneumonia due to infectious organism, unspecified laterality, unspecified part of lung   Sepsis, due to unspecified organism, unspecified whether acute organ dysfunction present (HCC)       ED Disposition  ED Disposition     ED Disposition Condition Comment    Decision to Admit            No follow-up provider specified.       Medication List      No changes were made to your prescriptions during this visit.          Fernando Coleman DO  12/09/21 1857      Electronically signed by Fernando Coleman DO at 12/09/21 1857     Isabel Zhang, RN at 12/09/21 1854        Pt assisted to use bed pan at this time.        Isabel Zhang, ELENI  12/09/21 1854      Electronically signed by Isabel Zhang RN at 12/09/21 1854          Physician Progress Notes (last 24 hours)      Gabriele Bo MD at 12/09/21 2113        Hospitalist Update:     Notified by cardiologist Dr Toussaint that patient has severe LV dysfunction. Will stop maintenance IV fluids. Patient is still tachycardic despite maxed out IV cardizem drip; Dr Toussaint suspects cardiomyopathy could be driving tachycardia, along with severe sepsis and meth in her system. Recommends IV amidarone bolus followed by  initiation of drip per protocol, along with dose of IV digoxin. Will keep NPO after midnight for possible cardioversion. Appreciate cardiology assistance in patient's management.     Electronically signed by Gabriele Bo MD at 12/09/21 2116       Scheduled Meds Sorted by Name  for Tiera Mendoza as of 12/8/21 through 12/10/21    1 Day 3 Days 7 Days 10 Days < Today >   Legend:                          Inactive     Active     Other Encounter     Linked                 Medications 12/08/21 12/09/21 12/10/21   acetaminophen (TYLENOL) tablet 1,000 mg  Dose: 1,000 mg  Freq: Once Route: PO  Start: 12/09/21 1115 End: 12/09/21 1135   Admin Instructions:   Do not exceed 4 grams of acetaminophen in a 24 hr period. Max dose of 2gm for AST/ALT greater than 120 units/L      If given for pain, use the following pain scale:   Mild Pain = Pain Score of 1-3, CPOT 1-2  Moderate Pain = Pain Score of 4-6, CPOT 3-4  Severe Pain = Pain Score of 7-10, CPOT 5-8          1135           amiodarone (CORDARONE) 150 mg in dextrose (D5W) 5 % 100 mL IVPB loading dose  Dose: 150 mg  Freq: Once Route: IV  Start: 12/09/21 2200 End: 12/09/21 2141   Admin Instructions:   Central line preferred, if unavailable use large bore IV access with frequent nurse monitoring of IV site.           IV med requiring filtration 0.22 micron inline filter          2141           budesonide-formoterol (SYMBICORT) 160-4.5 MCG/ACT inhaler 2 puff  Dose: 2 puff  Freq: 2 Times Daily - RT Route: IN  Start: 12/09/21 1200   Admin Instructions:    Shake well.  Rinse mouth after use, do not swallow water.  Send aerosols to pharmacy in ziplock bag for proper disposal.          1127 [id number]   1900         cefTRIAXone (ROCEPHIN) 2 g in sodium chloride 0.9 % 100 mL IVPB-VTB  Dose: 2 g  Freq: Once Route: IV  Indications of Use: PNEUMONIA  Last Dose: Stopped (12/09/21 0444)  Start: 12/09/21 0331 End: 12/09/21 0444   Admin Instructions:   Caution: Look  alike/sound alike drug alert          0414   0444          dexamethasone (DECADRON) injection 6 mg  Dose: 6 mg  Freq: Once Route: IV  Start: 12/09/21 0953 End: 12/09/21 1025   Admin Instructions:   For IV administration.  May be pushed over a minimum of 1 minute.          1025           digoxin (LANOXIN) injection 250 mcg  Dose: 250 mcg  Freq: Once Route: IV  Start: 12/09/21 2200 End: 12/09/21 2125   Admin Instructions:    Check and record heart rate. Use filter needle to withdraw dose from ampule. Dose may be pushed over 5 minutes.          2125           dilTIAZem (CARDIZEM) injection 5 mg  Dose: 5 mg  Freq: Once Route: IV  Start: 12/09/21 1618 End: 12/09/21 1622   Admin Instructions:   Caution: Look alike/sound alike drug alert. Refrigerate. May give IV push over 2 minutes.          1622           doxycycline (VIBRAMYCIN) 100 mg in sodium chloride 0.9 % 100 mL IVPB-VTB  Dose: 100 mg  Freq: Every 12 Hours Route: IV  Indications of Use: PNEUMONIA,SEPSIS  Start: 12/09/21 2100 End: 12/16/21 2059   Admin Instructions:   Protect from light.          2234 0900 2100         heparin (porcine) 5000 UNIT/ML injection 5,000 Units  Dose: 5,000 Units  Freq: Once Route: IV  Indications of Use: DVT/PE (active thrombosis)  Start: 12/09/21 0427 End: 12/09/21 0449   Admin Instructions:   **Max dose = 5000 units**          0449           influenza vac split quad (FLUZONE,FLUARIX,AFLURIA,FLULAVAL) injection 0.5 mL  Dose: 0.5 mL  Freq: Once Route: IM  Start: 12/09/21 2100 End: 12/09/21 2232   Admin Instructions:   **Do Not Administer if Temperature Greater Than 102F & Notify Pharmacy** Pneumococcal & Influenza Vaccines May Be Given at the Same Time in SEPARATE Injections.  Do not administer if temperature greater than 102F & notify pharmacy. Pneumococcal and influenza vaccines may be given at the same time in SEPARATE injections.          2232           Ioversol (OPTIRAY 320) injection 100 mL  Dose: 100 mL  Freq: Once in  Imaging Route: IV  Start: 12/09/21 0358 End: 12/09/21 0357          0357           ipratropium (ATROVENT) nebulizer solution 0.5 mg  Dose: 0.5 mg  Freq: 4 Times Daily - RT Route: NEBULIZATION  Start: 12/09/21 2045   Admin Instructions:             (2042) [C]             002   0714   1300     1900          methylPREDNISolone sodium succinate (SOLU-Medrol) injection 40 mg  Dose: 40 mg  Freq: Every 12 Hours Route: IV  Start: 12/09/21 2115   Admin Instructions:   Caution: Look alike/sound alike drug alert          2118 0915 2115         montelukast (SINGULAIR) tablet 10 mg  Dose: 10 mg  Freq: Nightly Route: PO  Start: 12/09/21 2100 2117             2100          piperacillin-tazobactam (ZOSYN) IVPB 3.375 g in 100 mL NS VTB  Dose: 3.375 g  Freq: Every 8 Hours Route: IV  Indications of Use: PNEUMONIA  Start: 12/09/21 2300 End: 12/16/21 2259          2238             0617   1500   2300        piperacillin-tazobactam (ZOSYN) IVPB 3.375 g in 100 mL NS VTB  Dose: 3.375 g  Freq: Once Route: IV  Last Dose: Stopped (12/09/21 1637)  Start: 12/09/21 1700 End: 12/09/21 1637          1553   1637   1700         piperacillin-tazobactam (ZOSYN) IVPB 4.5 g in 100 mL NS VTB  Dose: 4.5 g  Freq: Once Route: IV  Indications of Use: PNEUMONIA  Last Dose: Stopped (12/09/21 0556)  Start: 12/09/21 0427 End: 12/09/21 0556 0526   0556          sodium chloride 0.9 % bolus 1,000 mL  Dose: 1,000 mL  Freq: Once Route: IV  Last Dose: Stopped (12/09/21 0903)  Start: 12/09/21 0510 End: 12/09/21 0903 0526 0903          sodium chloride 0.9 % bolus 1,000 mL  Dose: 1,000 mL  Freq: Once Route: IV  Last Dose: Stopped (12/09/21 0717)  Start: 12/09/21 0331 End: 12/09/21 0717 0413   0717          sodium chloride 0.9 % bolus 1,000 mL  Dose: 1,000 mL  Freq: Once Route: IV  Last Dose: Stopped (12/09/21 0323)  Start: 12/09/21 0233 End: 12/09/21 0323          0253   0323          sodium chloride 0.9 % flush 10  mL  Dose: 10 mL  Freq: Every 12 Hours Scheduled Route: IV  Start: 12/09/21 2100          2118 0900   2100         Medications 12/08/21 12/09/21 12/10/21           Continuous Meds Sorted by Name  for Tiera Mendoza as of 12/8/21 through 12/10/21  Legend:                          Inactive     Active     Other Encounter     Linked                 Medications 12/08/21 12/09/21 12/10/21   amiodarone 360 mg in 200 mL D5W infusion  Rate: 33.3 mL/hr Dose: 1 mg/min  Freq: Continuous Route: IV  Last Dose: Stopped (12/10/21 0407)  Start: 12/09/21 2200 End: 12/10/21 0407   Admin Instructions:   Central line preferred, if unavailable use large bore IV access with frequent nurse monitoring of IV site.           IV med requiring filtration 0.22 micron inline filter          2208 0407   0407-D/C'd       Followed by  amiodarone 360 mg in 200 mL D5W infusion  Rate: 16.67 mL/hr Dose: 0.5 mg/min  Freq: Continuous Route: IV  Last Dose: 0.5 mg/min (12/10/21 0407)  Start: 12/10/21 0408   Admin Instructions:   After 18 hours and patient is not NPO, notify physician to consider oral dosing.  Central line preferred, if unavailable use large bore IV access with frequent nurse monitoring of IV site.           IV med requiring filtration 0.22 micron inline filter           0407          dilTIAZem (CARDIZEM) 100 mg in 100 mL NS infusion (ADV)  Rate: 5-15 mL/hr Dose: 5-15 mg/hr  Freq: Titrated Route: IV  Last Dose: 15 mg/hr (12/10/21 0419)  Start: 12/09/21 1632   Admin Instructions:   For heart rate - To maintain HR less than 120, initiate infusion at 5 mg/hr. Titrate up or down 5 mg/hr every 15 minutes to use the lowest dose possible. Maximum infusion rate of 15 mg/hr. Hold for SBP less than 100 mmHg or heart rate less than 60. Contact provider if unable to maintain HR less than 120 on maximum dose. Once Heart Rate target achieved obtain vitals a minimum of every 30 minutes. For patients not in critical care areas -  obtain vitals a minimum of every 4 hours.  Caution: Look alike/sound alike drug alert.          6409   1650   2010     3300 1565 [C]            0418          heparin 88510 units/250 mL (100 units/mL) in 0.45 % NaCl infusion  Rate: 9.99 mL/hr Dose: 7.87 Units/kg/hr  Weight Dosing Info: 127 kg  Freq: Titrated Route: IV  Indications of Use: DVT/PE (active thrombosis)  Last Dose: 19.87 Units/kg/hr (12/10/21 0155)  Start: 12/09/21 0427   Admin Instructions:   Weight Based Heparin Nomogram:    PTT  less than 45 sec:  Bolus 60 units/kg (Maximum dose = 5000 units) and increase rate by 4 units/kg/hr.  PTT 45-55 sec: Bolus 30 units/kg (Maximum dose = 2500 units) and increase rate by 2 units/kg/hr.  PTT 56-80 sec: No bolus, no rate change.  PTT  sec: No bolus, decrease rate by 2 units/kg/hr.  PTT Greater Than 100 sec:  No bolus.  Hold infusion 1 hour. Decrease rate By 3 units/kg/hr.          0451   1136 [C]   1847 [C]     2208             0158          Pharmacy to Dose Zosyn  Freq: Continuous Route: XX  Indications of Use: PNEUMONIA,SEPSIS  Start: 12/09/21 2100 End: 12/16/21 2059          (9325)           Pharmacy to Dose Zosyn  Freq: Continuous PRN Route: XX  PRN Reason: Consult  Indications of Use: PNEUMONIA  Start: 12/09/21 1536 End: 12/16/21 1535         sodium chloride 0.9 % infusion  Rate: 75 mL/hr Dose: 75 mL/hr  Freq: Continuous Route: IV  Last Dose: Stopped (12/09/21 0717)  Start: 12/09/21 0331 End: 12/09/21 2020          0414   0639   0717     2020-D/C'd  2027 [C]           Medications 12/08/21 12/09/21 12/10/21         PRN Meds Sorted by Name  for RidRobel fernandeza as of 12/8/21 through 12/10/21  Legend:                          Inactive     Active     Other Encounter     Linked                 Medications 12/08/21 12/09/21 12/10/21   albuterol (PROVENTIL) nebulizer solution 0.083% 2.5 mg/3mL  Dose: 2.5 mg  Freq: Every 4 Hours PRN Route: NEBULIZATION  PRN Reasons: Wheezing,Shortness of Air  Start: 12/09/21  1105 End: 12/09/21 2015          1114   1912   2015-D/C'd       dextrose (D50W) (25 g/50 mL) IV injection 25 g  Dose: 25 g  Freq: Every 15 Minutes PRN Route: IV  PRN Reason: Low Blood Sugar  PRN Comment: Blood Sugar Less Than 70  Start: 12/09/21 2017   Admin Instructions:   Blood sugar less than 70; patient has IV access - Unresponsive, NPO or Unable To Safely Swallow         dextrose (D50W) (25 g/50 mL) IV injection 25 g  Dose: 25 g  Freq: Every 15 Minutes PRN Route: IV  PRN Reason: Low Blood Sugar  PRN Comment: Blood Sugar Less Than 70  Start: 12/09/21 2000   Admin Instructions:   Blood sugar less than 70; patient has IV access - Unresponsive, NPO or Unable To Safely Swallow         dextrose (GLUTOSE) oral gel 15 g  Dose: 15 g  Freq: Every 15 Minutes PRN Route: PO  PRN Reason: Low Blood Sugar  PRN Comment: Blood sugar less than 70  Start: 12/09/21 2017   Admin Instructions:   BS<70, Patient Alert, Is not NPO, Can safely swallow.         dextrose (GLUTOSE) oral gel 15 g  Dose: 15 g  Freq: Every 15 Minutes PRN Route: PO  PRN Reason: Low Blood Sugar  PRN Comment: Blood sugar less than 70  Start: 12/09/21 2000   Admin Instructions:   BS<70, Patient Alert, Is not NPO, Can safely swallow.         glucagon (human recombinant) (GLUCAGEN DIAGNOSTIC) injection 1 mg  Dose: 1 mg  Freq: Every 15 Minutes PRN Route: SC  PRN Reason: Low Blood Sugar  PRN Comment: Blood Glucose Less Than 70  Start: 12/09/21 2017   Admin Instructions:   Blood Glucose Less Than 70 - Patient Without IV Access - Unresponsive, NPO or Unable To Safely Swallow         glucagon (human recombinant) (GLUCAGEN DIAGNOSTIC) injection 1 mg  Dose: 1 mg  Freq: Every 15 Minutes PRN Route: SC  PRN Reason: Low Blood Sugar  PRN Comment: Blood Glucose Less Than 70  Start: 12/09/21 2000   Admin Instructions:   Blood Glucose Less Than 70 - Patient Without IV Access - Unresponsive, NPO or Unable To Safely Swallow         heparin (porcine) 5000 UNIT/ML injection 2,500  Units  Dose: 2,500 Units  Freq: As Needed Route: IV  PRN Comment: Per Heparin Nomogram For PTT 45-55  Indications of Use: DVT/PE (active thrombosis)  Start: 12/09/21 0418   Admin Instructions:   **Max dose = 2500 units**         heparin (porcine) 5000 UNIT/ML injection 5,000 Units  Dose: 5,000 Units  Freq: As Needed Route: IV  PRN Comment: Per Heparin Nomogram For PTT Less Than 45  Indications of Use: DVT/PE (active thrombosis)  Start: 12/09/21 0418   Admin Instructions:   **Max dose = 5000 units**          1135 [C]   1847 [C]            0154          ondansetron (ZOFRAN) injection 4 mg  Dose: 4 mg  Freq: Every 6 Hours PRN Route: IV  PRN Reasons: Nausea,Vomiting  Start: 12/10/21 0431           0442          Pharmacy to Dose Zosyn  Freq: Continuous PRN Route: XX  PRN Reason: Consult  Indications of Use: PNEUMONIA  Start: 12/09/21 1536 End: 12/16/21 1535         sodium chloride 0.9 % flush 10 mL  Dose: 10 mL  Freq: As Needed Route: IV  PRN Reason: Line Care  Start: 12/09/21 2000         sodium chloride 0.9 % flush 10 mL  Dose: 10 mL  Freq: As Needed Route: IV  PRN Reason: Line Care  Start: 12/09/21 0230         Medications 12/08/21 12/09/21 12/10/21

## 2021-12-10 NOTE — CONSULTS
CONSULT NOTE    Patient Identification:  Tiera Mendoza  46 y.o.  female  1975  [id number]            Requesting physician:  Dr guy Toscano    Reason for Consultation: Asthma pneumonia PE acute Evoxac respiratory failure    CC:  soa  History of Present Illness:  Patient is a 46-year-old with a previous medical history of methamphetamine abuse asthma diabetes defibrillation who presented to the hospital after failing outpatient treatment for a pneumonia.  She was taking doxycycline as well as a Medrol Dosepak she reported initially feeling well however having recurrence.  In the ER she was given Rocephin Decadron Zosyn and 3 L normal saline bolus.  CT PE protocol did reveal bilateral groundglass infiltrates as well as a significant right lower lobe infiltrate with patchy infiltrates elsewhere as well as a PE.    She has a history of asthma. Takes albuterol 3x a day    She tells me she snorts her meth, she does not inject.  She adamantly denies any ivdu.    She also tells me that she has had difficulty swallowing over the past 5 years.  She says this occurs with both liquids and solids.  Oftentimes that she has to cough or throw this up afterwards.  She says that it is better when she drinks something to help it move down her esophagus.  Getting worse not better.    Review of Systems:  CONSTITUTIONAL:  Denies fevers or chills  EYE:  No new vision changes  EAR:  No change in hearing  CARDIAC:  No chest pain  PULMONARY:  + cough +shortness of breath  GI:  No diarrhea, hematemesis or hematochezia, +dysphagia  RENAL:  No dysuria or urinary frequency  MUSCULOSKELETAL:  No musculoskeletal complaints  ENDOCRINE:  No heat or cold intolerance  INTEGUMENTARY: No skin rashes  NEUROLOGICAL:  No dizziness or confusion.  No seizure activity  PSYCHIATRIC:  No new anxiety or depression  12 system review of systems performed and all else negative    Past Medical History:   Diagnosis Date   • Asthma    • Diabetes  "mellitus (HCC)     GDM last pregnancy   • History of atrial fibrillation    • Substance abuse (HCC)        Past Surgical History:   Procedure Laterality Date   •  SECTION      x 3   • HIP FRACTURE SURGERY  2017        Medications Prior to Admission   Medication Sig Dispense Refill Last Dose   • albuterol (PROVENTIL HFA;VENTOLIN HFA) 108 (90 BASE) MCG/ACT inhaler Inhale 2 puffs Every 4 (Four) Hours As Needed for Wheezing or Shortness of Air.   2021 at Unknown time   • albuterol (PROVENTIL) (2.5 MG/3ML) 0.083% nebulizer solution Take 2.5 mg by nebulization Every 4 (Four) Hours As Needed for Wheezing or Shortness of Air.   2021 at Unknown time   • budesonide-formoterol (SYMBICORT) 160-4.5 MCG/ACT inhaler Inhale 2 puffs 2 (Two) Times a Day.   2021 at Unknown time   • montelukast (SINGULAIR) 10 MG tablet Take 10 mg by mouth Every Night.   2021 at Unknown time       No Known Allergies    Social History     Socioeconomic History   • Marital status:    Tobacco Use   • Smoking status: Never Smoker   • Smokeless tobacco: Current User     Types: Chew   Vaping Use   • Vaping Use: Never used   Substance and Sexual Activity   • Alcohol use: No   • Drug use: Yes     Types: Hydrocodone, Codeine, Methamphetamines     Comment: \"stopped around 6 weeks pregnant\"   • Sexual activity: Defer       History reviewed. No pertinent family history.    Physical Exam:  /93   Pulse (!) 126   Temp 98.6 °F (37 °C) (Oral)   Resp 28   Ht 172.7 cm (68\")   Wt 131 kg (288 lb 12.8 oz)   SpO2 93%   BMI 43.91 kg/m²   Body mass index is 43.91 kg/m².   General appearance: NAD, conversant   Eyes: anicteric sclerae, moist conjunctivae; no lid-lag; PERRLA  HENT: Atraumatic; oropharynx clear with moist mucous membranes and no mucosal ulcerations; normal hard and soft palate  Neck: Trachea midline; FROM, supple, no thyromegaly or lymphadenopathy  Lungs: +wheeze b but with good air movement, with normal " respiratory effort and no intercostal retractions  CV: tachy irreg irreg  Abdomen: Soft, non-tender obese  Extremities: No peripheral edema    Skin: wwp multiple lesions about 1cm noted on le  Psych: Appropriate affect, alert and oriented  Neuro speech intact  , moves all ext    LABS:  Results from last 7 days   Lab Units 12/10/21  0044 12/09/21  0233   WBC 10*3/mm3 19.78* 29.87*   HEMOGLOBIN g/dL 10.4* 10.5*   PLATELETS 10*3/mm3 267 318     Results from last 7 days   Lab Units 12/10/21  0044 12/09/21  0233   SODIUM mmol/L 131* 130*   POTASSIUM mmol/L 4.4 4.3   CHLORIDE mmol/L 98 93*   CO2 mmol/L 16.5* 24.0   BUN mg/dL 23* 22*   CREATININE mg/dL 1.05* 1.05*   GLUCOSE mg/dL 198* 136*   CALCIUM mg/dL 8.4* 8.7   MAGNESIUM mg/dL 2.1  --    Estimated Creatinine Clearance: 96.9 mL/min (A) (by C-G formula based on SCr of 1.05 mg/dL (H)).    Imaging: I personally visualized the images of scans/x-rays performed within last 3 days.  Imaging Results (Most Recent)     Procedure Component Value Units Date/Time    CT Chest Pulmonary Embolism [[id number]] Collected: 12/09/21 0409     Updated: 12/09/21 0411    Narrative:      CT CHEST PULMONARY EMBOLISM W CONTRAST    INDICATION:   Sepsis and recurrent pneumonia    TECHNIQUE:   CT angiogram of the chest with IV contrast. 3-D reconstructions were obtained and reviewed.   Radiation dose reduction techniques included automated exposure control or exposure modulation based on body size. Count of known CT and cardiac nuc med studies  performed in previous 12 months: 0.     COMPARISON:   None available.    FINDINGS:   There is adequate opacification of pulmonary arteries. There is right lower lobe pulmonary embolus which is occlusive bases. There is dense consolidation throughout the right lower lobe. There is mild interstitial prominence throughout the lungs there  are some patchy areas of groundglass density in the upper lobes. Aorta is normal in size. There is a small right effusion.  There is no adenopathy.          Impression:      Right lower lobe pulmonary embolus which appears occlusive in the lower branches. There is no evidence of pulmonary infarct.    Dense groundglass infiltrate throughout the right lower lobe    Patchy groundglass infiltrates throughout the rest the lungs with diffuse interstitial prominence.    There is no evidence of right heart enlargement    I discussed these results with Dr. Naranjo at the time of this dictation    Signer Name: Kevin Barrios MD   Signed: 12/9/2021 4:09 AM   Workstation Name: [facility]    [facility]    XR Chest 1 View [[id number]] Collected: 12/09/21 0326     Updated: 12/09/21 0328    Narrative:      CR Chest 1 Vw    INDICATION:   Shortness during cough, follow pneumonia     COMPARISON:    220    FINDINGS:  Portable AP view(s) of the chest. There Are low lung volumes with mild right base atelectasis or infiltrate. Left lung appears clear. Heart size normal. Bones are      Impression:      Low inspiratory volumes with probable right lower lobe infiltrate or atelectasis    Signer Name: Kevin Barrios MD   Signed: 12/9/2021 3:26 AM   Workstation Name: [facility]    [facility]          Assessment / Recommendations:  Severe Sepsis  PE  Bilateral pulmonary infiltrates  Aflutter rvr  chaka  Hyperglycemia  Hyponatremia  Transaminitis  Asthma  Morbid obesity  Methamphetamine  Pleural effusion right  Dysphagia    Ipratropium - if cardiology feels that okay from aflutter perspective would suggest replacing with duonebs  Cont solumedrol     Cont zosn and doxy - with history suspect aspiration  Needs speech therapy and likely GI to investigate this dysphagia - worry that may be a schlatzkis web or achlasia    Cont therapeutic AC for pe  Await echo rule out veg however she does deny any ivdu.  I explained reasoning for inquiring, but she says no needles.    Would suggest follow up ct chest to ensure resolution.    Repeat  serial cxr monitor effusion, does not appear loculated but if enlarges may need thoractensis.  Suspect this may be para pneumonic in nature.           Brandon Sorensen MD  [facility]  12/10/21  1107EST

## 2021-12-10 NOTE — THERAPY EVALUATION
"Acute Care - Speech Language Pathology   Swallow Initial Evaluation [facility]   CLINICAL DYSPHAGIA ASSESSMENT     Patient Name: Tiera Mendoza  : 1975  MRN: [id number]  Today's Date: 12/10/2021             Admit Date: 2021    Tiera Mendoza  is seen at bedside this am on PCU to assess safety/efficacy of swallowing fnx, determine safest/least restrictive diet tolerance.     Ms. Mendoza presented to [facility] ED 21 w/ SOA. CT chest revealed right lower lobe pulmonary embolism, dense groundglass inflitrate throughout right lower lobe. She was admitted for evaluation/treatment.     PMH is significant for advanced maternal age (35+), previous  delivery affecting pregnancy, previous stillbirth antepartum, respiratory insufficiency, narcotic dependency. She is noted w/ reported recent methamphetamine use, but not within the last week.     She is referred to r/o dysphagia. She was npo earlier today for possible cardioversion, however, cardiology has deferred with hospitalist clearing for participation in this assessment to determine po diet candidacy. She is currently NPO.     Social History     Socioeconomic History   • Marital status:    Tobacco Use   • Smoking status: Never Smoker   • Smokeless tobacco: Current User     Types: Chew   Vaping Use   • Vaping Use: Never used   Substance and Sexual Activity   • Alcohol use: No   • Drug use: Yes     Types: Hydrocodone, Codeine, Methamphetamines     Comment: \"stopped around 6 weeks pregnant\"   • Sexual activity: Defer      Diet Orders (active) (From admission, onward)     Start     Ordered    12/10/21 1334  Diet Regular; Thin; Cardiac  Diet Effective Now         12/10/21 1334              She is observed on o2 via NC.     Ms. Mendoza is positioned upright and centered in bed upon my entry. She is participating in a cell phone conversation with a male. She is moderately agitated, asking him\"where have you been all night.\" She becomes increasingly agitated " "as male indicates that he will be late for visitation today. She does calm to participate once she realizes that I am there to offer her food and drink. SHe is eager for \"something to drink. I'm dried out.\"     She accepts  multiple po presentations of ice chips, solid cracker, puree and thin liquids via spoon, cup and straw.  She is able to self feed.     Facial/oral structures are symmetrical upon observation. Lingual protrusion reveals no deviation. Oral mucosa are mildly xerostomic, pink, and clean. Secretions are clear, tacky, and controlled. OROM/DONNY is weak in general to imitate oral postures. Gag is not assessed. Volitional cough is intact w/ adequate  intensity, congestive in quality, non-productive. Voice is adequate in intensity, clear in quality w/ intelligible speech. She is a/a, interactive, oriented x4, follows directives, participates in conversational exchanges. She denies any previous dysphagia or concerns for current dysphagia. She denies any odynophagia.     Upon po presentations, adequate bolus anticipation and acceptance w/ good labial seal for bolus clearance via spoon bowl, cup rim stability and suction via straw. Bolus formation, manipulation and control are wfl w/ rotary mastication pattern. A-p transit is timely w/o oral residue. No overt s/s aspiration before the swallow.     Pharyngeal swallow is timely w/ adequate hyolaryngeal elevation per palpation. No overt s/s aspiration evidenced across this evaluation. No silent aspiration suspected. She denies odynophagia.    Visit Dx:     ICD-10-CM ICD-9-CM   1. Single subsegmental pulmonary embolism without acute cor pulmonale ([facility])  I26.93 415.19   2. Pneumonia due to infectious organism, unspecified laterality, unspecified part of lung  J18.9 486   3. Sepsis, due to unspecified organism, unspecified whether acute organ dysfunction present ([facility])  A41.9 038.9     995.91     Patient Active Problem List   Diagnosis   • AMA (advanced maternal age) " multigravida 35+   • Previous  delivery affecting pregnancy   • Opioid dependence on maintenance agonist therapy, no symptoms (HCC)   • Previous stillbirth or demise, antepartum   • Acute respiratory failure with hypoxia (HCC)   • Single subsegmental pulmonary embolism without acute cor pulmonale (HCC)     Past Medical History:   Diagnosis Date   • Asthma    • Diabetes mellitus (HCC)     GDM last pregnancy   • History of atrial fibrillation    • Substance abuse (HCC)      Past Surgical History:   Procedure Laterality Date   •  SECTION      x 3   • HIP FRACTURE SURGERY  2017     EDUCATION  The patient has been educated in the following areas:   Dysphagia (Swallowing Impairment) Oral Care/Hydration.     Impression: Ms. Mendoza presents w/ wfl oropharyngeal swallow w/o s/s aspiration.No s/s indicative of silent aspiration.  No odynophagia reported.  She is noted to require O2 via NC w/ non rebreather as well. She is generally fatigued. Per this status, I did d/w her importance of upright positioning w/all po intake, small bites, sips, slow bolus rate. She verbalized understanding.     She is felt to most benefit from least restrictive regular consistency po diet, thin liquids. Medications whole in puree/thins. Single pill presentations only, please. Upright and centered for all po intake.     SLP Recommendation and Plan    1. Regular consistency po diet, thin liquids.    2. Medications whole in puree/thins. Single pill presentations only, please.   3. Upright and centered for all po intake  4. CHAPINCITO precautions.  5. Oral care protocol.  No further formal SLP f/u warranted at this time.    D/w Ms. Mendoza results and recommendations w/ verbal agreement.    Thank you for allowing me to participate in the care of your patient-  Debora Lange M.S., [facility]/SLP                                                                                Time Calculation:    Time Calculation- SLP     Time Calculation- SLP      Row Name 12/10/21 0905    SLP Received On 12/10/21  -Recorded by: LISA    SLP - Next Appointment 12/11/21  -Recorded by: LISA                User Key     Initials Name Provider Type    Debora Garcia, MS CCC-SLP Speech and Language Pathologist                Therapy Charges for Today     Code Description Service Date Service Provider Modifiers Qty    [id number] HC ST EVAL ORAL PHARYNG SWALLOW 3 12/10/2021 Debora Lange, MS CCC-SLP GN 1               Debora Lange MS CCC-SLP  12/10/2021

## 2021-12-10 NOTE — CASE MANAGEMENT/SOCIAL WORK
Discharge Planning Assessment   [facility]     Patient Name: Tiera Mendoza  MRN: [id number]  Today's Date: 12/10/2021    Admit Date: 12/9/2021       Discharge Plan     Row Name 12/10/21 1733       Plan    Plan SS received consult for +Meth, assist with drug abuse tx resources. SS attempted to complete consult and pt was unable talk with SS at this time. SS to follow and assist as needed with discharge planning.              MYNOR Sam

## 2021-12-10 NOTE — PLAN OF CARE
Goal Outcome Evaluation:  Plan of Care Reviewed With: patient        Progress: improving  Outcome Summary: Pt admitted from ER last night, with SOA and found to have a PE on CT, Heparin infusing, also pt went into A-FIB and was started on cardizem in ER, since she has had Dig 250mcq X1 dose and started on Amio also with a bolus see MAR, VSS with -120's, NPO since midnight for possiable electro-cardioversion, c/o mild nausea this AM and waiting on orders at time, will continue to monitor..

## 2021-12-10 NOTE — PLAN OF CARE
Consultation received, chart reviewed. Noted Ms. Mendoza is currently NPO since midnight to await possible elctro-cardioversion. Per this status, SLP assessment deferred, pending clearance to accept po intake to participate.     Please continue universal aspiration precautions, CHAPINCITO precautions.     Thank you   Debora Lange M.S., CCC/SLP    Goal Outcome Evaluation:

## 2021-12-10 NOTE — PROGRESS NOTES
Assisted By: Kimber KNOWLES    CC: Follow-up on multiple medical problems    Interview History/HPI: Patient states she is feeling a little better from a breathing standpoint less short of breath.  She denies any chest pain or other pain, she states at home she was having some intermittent palpitations.  She has had no hemoptysis but she has been bringing up some discolored phlegm.  She completed a course of antibiotics and steroids at home.  This was recent.  She has had some intermittent nausea.  She states she did use meth in the recent past but none in the last week.  She does not use IV drugs, non-smoker.  She did not have home oxygen.  Patient states she had atrial fibrillation before after her child was born in 2010    ROS:     Vitals:    12/10/21 1232   BP: 139/91   Pulse: 101   Resp: 26   Temp:    SpO2: 92%         Intake/Output Summary (Last 24 hours) at 12/10/2021 1239  Last data filed at 12/10/2021 0900  Gross per 24 hour   Intake 100 ml   Output 250 ml   Net -150 ml       EXAM: She has some skin sores in her legs and arms, these are consistent with what appear to be methamphetamine use.  She states they do not itch.  Mood is good skin warm dry no distress on 2 L lungs have bilateral breath sounds with some crackles midlung right posterior some coarse expiratory wheezing anteriorly.  Heart is tachycardic irregularly irregular rhythm without murmur.  Abdomen is soft, benign, on her left flank area there is a resolving bruise.  Extremities without edema strength is symmetric temperature is 98.6.      EKG:     Tele: Atrial fibrillation with tachycardia    LABS:     Lab Results (last 48 hours)     Procedure Component Value Units Date/Time    aPTT [[id number]]  (Abnormal) Collected: 12/10/21 0723    Specimen: Blood Updated: 12/10/21 0832     PTT 91.7 seconds     Narrative:      PTT Heparin Therapeutic Range:  59 - 95 seconds      POC Glucose Once [[id number]]  (Abnormal) Collected: 12/10/21 0530    Specimen: Blood  Updated: 12/10/21 0536     Glucose 136 mg/dL      Comment: RN Notified Meter: [id number] : [id number] [facility]       Troponin [[id number]]  (Normal) Collected: 12/10/21 0303    Specimen: Blood Updated: 12/10/21 0332     Troponin T <0.010 ng/mL     Narrative:      Troponin T Reference Range:  <= 0.03 ng/mL-   Negative for AMI  >0.03 ng/mL-     Abnormal for myocardial necrosis.  Clinicians would have to utilize clinical acumen, EKG, Troponin and serial changes to determine if it is an Acute Myocardial Infarction or myocardial injury due to an underlying chronic condition.       Results may be falsely decreased if patient taking Biotin.      Blood Culture - Blood, Arm, Left [[id number]]  (Normal) Collected: 12/09/21 0256    Specimen: Blood from Arm, Left Updated: 12/10/21 0315     Blood Culture No growth at 24 hours    Blood Culture - Blood, Arm, Left [[id number]]  (Normal) Collected: 12/09/21 0233    Specimen: Blood from Arm, Left Updated: 12/10/21 0245     Blood Culture No growth at 24 hours    Comprehensive Metabolic Panel [[id number]]  (Abnormal) Collected: 12/10/21 0044    Specimen: Blood Updated: 12/10/21 0145     Glucose 198 mg/dL      BUN 23 mg/dL      Creatinine 1.05 mg/dL      Sodium 131 mmol/L      Potassium 4.4 mmol/L      Chloride 98 mmol/L      CO2 16.5 mmol/L      Calcium 8.4 mg/dL      Total Protein 7.2 g/dL      Albumin 2.80 g/dL      ALT (SGPT) 222 U/L      AST (SGOT) 242 U/L      Alkaline Phosphatase 180 U/L      Total Bilirubin 3.1 mg/dL      eGFR Non African Amer 57 mL/min/1.73      Globulin 4.4 gm/dL      A/G Ratio 0.6 g/dL      BUN/Creatinine Ratio 21.9     Anion Gap 16.5 mmol/L     Narrative:      GFR Normal >60  Chronic Kidney Disease <60  Kidney Failure <15      C-reactive Protein [[id number]]  (Abnormal) Collected: 12/10/21 0044    Specimen: Blood Updated: 12/10/21 0143     C-Reactive Protein 30.87 mg/dL     Magnesium [[id number]]  (Normal) Collected: 12/10/21 0044    Specimen: Blood  Updated: 12/10/21 0143     Magnesium 2.1 mg/dL     aPTT [[id number]]  (Abnormal) Collected: 12/10/21 0044    Specimen: Blood Updated: 12/10/21 0127     PTT 37.3 seconds     Narrative:      PTT Heparin Therapeutic Range:  59 - 95 seconds      CBC & Differential [[id number]]  (Abnormal) Collected: 12/10/21 0044    Specimen: Blood Updated: 12/10/21 0115    Narrative:      The following orders were created for panel order CBC & Differential.  Procedure                               Abnormality         Status                     ---------                               -----------         ------                     CBC Auto Differential[[id number]]        Abnormal            Final result                 Please view results for these tests on the individual orders.    CBC Auto Differential [[id number]]  (Abnormal) Collected: 12/10/21 0044    Specimen: Blood Updated: 12/10/21 0115     WBC 19.78 10*3/mm3      RBC 4.21 10*6/mm3      Hemoglobin 10.4 g/dL      Hematocrit 34.7 %      MCV 82.4 fL      MCH 24.7 pg      MCHC 30.0 g/dL      RDW 18.5 %      RDW-SD 54.0 fl      MPV 10.8 fL      Platelets 267 10*3/mm3      Neutrophil % 91.3 %      Lymphocyte % 4.1 %      Monocyte % 3.9 %      Eosinophil % 0.0 %      Basophil % 0.1 %      Immature Grans % 0.6 %      Neutrophils, Absolute 18.05 10*3/mm3      Lymphocytes, Absolute 0.81 10*3/mm3      Monocytes, Absolute 0.78 10*3/mm3      Eosinophils, Absolute 0.00 10*3/mm3      Basophils, Absolute 0.02 10*3/mm3      Immature Grans, Absolute 0.12 10*3/mm3      nRBC 0.2 /100 WBC     S. Pneumo Ag Urine or CSF - Urine, Urine, Clean Catch [[id number]]  (Normal) Collected: 12/09/21 1802    Specimen: Urine, Clean Catch Updated: 12/09/21 2328     Strep Pneumo Ag Negative    POC Glucose Once [[id number]]  (Normal) Collected: 12/09/21 2250    Specimen: Blood Updated: 12/09/21 2257     Glucose 129 mg/dL      Comment: Meter: [id number] : [id number] Mackenzie Bermudez       Troponin [[id number]]  (Normal)  Collected: 12/09/21 2132    Specimen: Blood Updated: 12/09/21 2156     Troponin T <0.010 ng/mL     Narrative:      Troponin T Reference Range:  <= 0.03 ng/mL-   Negative for AMI  >0.03 ng/mL-     Abnormal for myocardial necrosis.  Clinicians would have to utilize clinical acumen, EKG, Troponin and serial changes to determine if it is an Acute Myocardial Infarction or myocardial injury due to an underlying chronic condition.       Results may be falsely decreased if patient taking Biotin.      Blood Gas, Arterial With Co-Ox [[id number]]  (Abnormal) Collected: 12/09/21 2129    Specimen: Arterial Blood Updated: 12/09/21 2130     Site Left Radial     Casimiro's Test Positive     pH, Arterial 7.423 pH units      pCO2, Arterial 33.3 mm Hg      pO2, Arterial 88.4 mm Hg      HCO3, Arterial 21.8 mmol/L      Base Excess, Arterial -2.1 mmol/L      O2 Saturation, Arterial 97.8 %      Hemoglobin, Blood Gas 10.4 g/dL      Comment: 84 Value below reference range        Hematocrit, Blood Gas 31.9 %      Comment: 84 Value below reference range        Oxyhemoglobin 95.6 %      Methemoglobin 0.00 %      Comment: 84 Value below reference range        Carboxyhemoglobin 2.4 %      A-a Gradiant 63.7 mmHg      CO2 Content 22.8 mmol/L      Temperature 0.0 C      Barometric Pressure for Blood Gas 723 mmHg      Modality Nasal Cannula     FIO2 28 %      Ventilator Mode NA     Note --     Collected by [id number]     Comment: Meter: [id number]     :  [id number]        pH, Temp Corrected --     pCO2, Temperature Corrected --     pO2, Temperature Corrected --    Hepatitis Panel, Acute [[id number]]  (Normal) Collected: 12/09/21 2026    Specimen: Blood Updated: 12/09/21 2106     Hepatitis B Surface Ag Non-Reactive     Hep A IgM Non-Reactive     Hep B C IgM Non-Reactive     Hepatitis C Ab Non-Reactive    Narrative:      Results may be falsely decreased if patient taking Biotin.     Lactic Acid, Plasma [[id number]]  (Abnormal) Collected: 12/09/21  2026    Specimen: Blood Updated: 12/09/21 2053     Lactate 3.6 mmol/L     Hemoglobin A1c [[id number]]  (Abnormal) Collected: 12/09/21 0233    Specimen: Blood from Arm, Left Updated: 12/09/21 2018     Hemoglobin A1C 6.20 %     Narrative:      Hemoglobin A1C Ranges:    Increased Risk for Diabetes  5.7% to 6.4%  Diabetes                     >= 6.5%  Diabetic Goal                < 7.0%    Respiratory Panel, PCR (WITHOUT COVID) - Swab, Nasopharynx [[id number]]  (Normal) Collected: 12/09/21 1636    Specimen: Swab from Nasopharynx Updated: 12/09/21 1809     ADENOVIRUS, PCR Not Detected     Coronavirus 229E Not Detected     Coronavirus HKU1 Not Detected     Coronavirus NL63 Not Detected     Coronavirus OC43 Not Detected     Human Metapneumovirus Not Detected     Human Rhinovirus/Enterovirus Not Detected     Influenza B PCR Not Detected     Parainfluenza Virus 1 Not Detected     Parainfluenza Virus 2 Not Detected     Parainfluenza Virus 3 Not Detected     Parainfluenza Virus 4 Not Detected     Bordetella pertussis pcr Not Detected     Chlamydophila pneumoniae PCR Not Detected     Mycoplasma pneumo by PCR Not Detected     Influenza A PCR Not Detected     RSV, PCR Not Detected     Bordetella parapertussis PCR Not Detected    Narrative:      The coronavirus on the RVP is NOT COVID-19 and is NOT indicative of infection with COVID-19.    In the setting of a positive respiratory panel with a viral infection PLUS a negative procalcitonin without other underlying concern for bacterial infection, consider observing off antibiotics or discontinuation of antibiotics and continue supportive care. If the respiratory panel is positive for atypical bacterial infection (Bordetella pertussis, Chlamydophila pneumoniae, or Mycoplasma pneumoniae), consider antibiotic de-escalation to target atypical bacterial infection.    aPTT [[id number]]  (Normal) Collected: 12/09/21 1752    Specimen: Blood from Arm, Left Updated: 12/09/21 1807     PTT 30.5  seconds     Narrative:      PTT Heparin Therapeutic Range:  59 - 95 seconds      Troponin [[id number]]  (Normal) Collected: 12/09/21 1636    Specimen: Blood from Arm, Left Updated: 12/09/21 1708     Troponin T <0.010 ng/mL     Narrative:      Troponin T Reference Range:  <= 0.03 ng/mL-   Negative for AMI  >0.03 ng/mL-     Abnormal for myocardial necrosis.  Clinicians would have to utilize clinical acumen, EKG, Troponin and serial changes to determine if it is an Acute Myocardial Infarction or myocardial injury due to an underlying chronic condition.       Results may be falsely decreased if patient taking Biotin.      aPTT [[id number]]  (Normal) Collected: 12/09/21 1110    Specimen: Blood from Arm, Right Updated: 12/09/21 1126     PTT 31.7 seconds     Narrative:      PTT Heparin Therapeutic Range:  59 - 95 seconds      Protime-INR [[id number]]  (Abnormal) Collected: 12/09/21 1110    Specimen: Blood from Arm, Right Updated: 12/09/21 1126     Protime 20.1 Seconds      INR 1.66    Narrative:      Suggested INR therapeutic range for stable oral anticoagulant therapy:    Low Intensity therapy:   1.5-2.0  Moderate Intensity therapy:   2.0-3.0  High Intensity therapy:   2.5-4.0    Urinalysis, Microscopic Only - Urine, Clean Catch [[id number]]  (Abnormal) Collected: 12/09/21 0737    Specimen: Urine, Clean Catch Updated: 12/09/21 0832     RBC, UA 0-2 /HPF      WBC, UA 0-2 /HPF      Bacteria, UA Trace /HPF      Squamous Epithelial Cells, UA 3-6 /HPF      Hyaline Casts, UA None Seen /LPF      Granular Casts, UA 0-2 /LPF      Amorphous Crystals, UA Large/3+ /HPF      Methodology Manual Light Microscopy    Urinalysis With Microscopic If Indicated (No Culture) - Urine, Clean Catch [[id number]]  (Abnormal) Collected: 12/09/21 0737    Specimen: Urine, Clean Catch Updated: 12/09/21 0805     Color, UA Dark Yellow     Appearance, UA Cloudy     pH, UA 5.5     Specific Gravity, UA >1.030     Glucose, UA Negative     Ketones, UA Negative      Bilirubin, UA Negative     Blood, UA Large (3+)     Protein, UA >=300 mg/dL (3+)     Leuk Esterase, UA Negative     Nitrite, UA Negative     Urobilinogen, UA 1.0 E.U./dL    Urine Drug Screen - Urine, Clean Catch [[id number]]  (Abnormal) Collected: 12/09/21 0737    Specimen: Urine, Clean Catch Updated: 12/09/21 0801     THC, Screen, Urine Negative     Phencyclidine (PCP), Urine Negative     Cocaine Screen, Urine Negative     Methamphetamine, Ur Positive     Opiate Screen Negative     Amphetamine Screen, Urine Positive     Benzodiazepine Screen, Urine Negative     Tricyclic Antidepressants Screen Negative     Methadone Screen, Urine Negative     Barbiturates Screen, Urine Negative     Oxycodone Screen, Urine Negative     Propoxyphene Screen Negative     Buprenorphine, Screen, Urine Negative    Narrative:      Cutoff For Drugs Screened:    Amphetamines               500 ng/ml  Barbiturates               200 ng/ml  Benzodiazepines            150 ng/ml  Cocaine                    150 ng/ml  Methadone                  200 ng/ml  Opiates                    100 ng/ml  Phencyclidine               25 ng/ml  THC                            50 ng/ml  Methamphetamine            500 ng/ml  Tricyclic Antidepressants  300 ng/ml  Oxycodone                  100 ng/ml  Propoxyphene               300 ng/ml  Buprenorphine               10 ng/ml    The normal value for all drugs tested is negative. This report includes unconfirmed screening results, with the cutoff values listed, to be used for medical treatment purposes only.  Unconfirmed results must not be used for non-medical purposes such as employment or legal testing.  Clinical consideration should be applied to any drug of abuse test, particularly when unconfirmed results are used.      STAT Lactic Acid, Reflex [[id number]]  (Abnormal) Collected: 12/09/21 0426    Specimen: Blood Updated: 12/09/21 0508     Lactate 3.6 mmol/L     aPTT [[id number]]  (Normal) Collected: 12/09/21 0426     Specimen: Blood Updated: 12/09/21 0507     PTT 29.2 seconds     Narrative:      PTT Heparin Therapeutic Range:  59 - 95 seconds      Protime-INR [[id number]]  (Abnormal) Collected: 12/09/21 0426    Specimen: Blood Updated: 12/09/21 0506     Protime 17.8 Seconds      INR 1.42    Narrative:      Suggested INR therapeutic range for stable oral anticoagulant therapy:    Low Intensity therapy:   1.5-2.0  Moderate Intensity therapy:   2.0-3.0  High Intensity therapy:   2.5-4.0    BNP [[id number]]  (Abnormal) Collected: 12/09/21 0233    Specimen: Blood from Arm, Left Updated: 12/09/21 0348     proBNP 4,147.0 pg/mL     Narrative:      Among patients with dyspnea, NT-proBNP is highly sensitive for the detection of acute congestive heart failure. In addition NT-proBNP of <300 pg/ml effectively rules out acute congestive heart failure with 99% negative predictive value.    Results may be falsely decreased if patient taking Biotin.      Lactic Acid, Plasma [[id number]]  (Abnormal) Collected: 12/09/21 0233    Specimen: Blood from Arm, Left Updated: 12/09/21 0306     Lactate 2.9 mmol/L     COVID-19 and FLU A/B PCR - Swab, Nasopharynx [[id number]]  (Normal) Collected: 12/09/21 0239    Specimen: Swab from Nasopharynx Updated: 12/09/21 0303     COVID19 Not Detected     Influenza A PCR Not Detected     Influenza B PCR Not Detected    Narrative:      Fact sheet for providers: https://www.fda.gov/media/427083/download    Fact sheet for patients: https://www.fda.gov/media/411517/download    Test performed by PCR.    Comprehensive Metabolic Panel [[id number]]  (Abnormal) Collected: 12/09/21 0233    Specimen: Blood from Arm, Left Updated: 12/09/21 0301     Glucose 136 mg/dL      BUN 22 mg/dL      Creatinine 1.05 mg/dL      Sodium 130 mmol/L      Potassium 4.3 mmol/L      Chloride 93 mmol/L      CO2 24.0 mmol/L      Calcium 8.7 mg/dL      Total Protein 8.1 g/dL      Albumin 3.31 g/dL      ALT (SGPT) 191 U/L      AST (SGOT) 134 U/L      Alkaline  Phosphatase 113 U/L      Total Bilirubin 2.8 mg/dL      eGFR Non African Amer 57 mL/min/1.73      Globulin 4.8 gm/dL      A/G Ratio 0.7 g/dL      BUN/Creatinine Ratio 21.0     Anion Gap 13.0 mmol/L     Narrative:      GFR Normal >60  Chronic Kidney Disease <60  Kidney Failure <15      Troponin [[id number]]  (Normal) Collected: 12/09/21 0233    Specimen: Blood from Arm, Left Updated: 12/09/21 0301     Troponin T 0.016 ng/mL     Narrative:      Troponin T Reference Range:  <= 0.03 ng/mL-   Negative for AMI  >0.03 ng/mL-     Abnormal for myocardial necrosis.  Clinicians would have to utilize clinical acumen, EKG, Troponin and serial changes to determine if it is an Acute Myocardial Infarction or myocardial injury due to an underlying chronic condition.       Results may be falsely decreased if patient taking Biotin.      C-reactive Protein [[id number]]  (Abnormal) Collected: 12/09/21 0233    Specimen: Blood from Arm, Left Updated: 12/09/21 0301     C-Reactive Protein 26.44 mg/dL     hCG, Serum, Qualitative [[id number]]  (Normal) Collected: 12/09/21 0233    Specimen: Blood from Arm, Left Updated: 12/09/21 0255     HCG Qualitative Negative    CBC & Differential [[id number]]  (Abnormal) Collected: 12/09/21 0233    Specimen: Blood from Arm, Left Updated: 12/09/21 0240    Narrative:      The following orders were created for panel order CBC & Differential.  Procedure                               Abnormality         Status                     ---------                               -----------         ------                     CBC Auto Differential[[id number]]        Abnormal            Final result                 Please view results for these tests on the individual orders.    CBC Auto Differential [[id number]]  (Abnormal) Collected: 12/09/21 0233    Specimen: Blood from Arm, Left Updated: 12/09/21 0240     WBC 29.87 10*3/mm3      RBC 4.25 10*6/mm3      Hemoglobin 10.5 g/dL      Hematocrit 33.6 %      MCV 79.1 fL      MCH 24.7  pg      MCHC 31.3 g/dL      RDW 17.8 %      RDW-SD 50.4 fl      MPV 10.6 fL      Platelets 318 10*3/mm3      Neutrophil % 84.3 %      Lymphocyte % 5.5 %      Monocyte % 8.8 %      Eosinophil % 0.0 %      Basophil % 0.3 %      Immature Grans % 1.1 %      Neutrophils, Absolute 25.21 10*3/mm3      Lymphocytes, Absolute 1.63 10*3/mm3      Monocytes, Absolute 2.62 10*3/mm3      Eosinophils, Absolute 0.00 10*3/mm3      Basophils, Absolute 0.08 10*3/mm3      Immature Grans, Absolute 0.33 10*3/mm3      nRBC 0.1 /100 WBC                Radiology:    Imaging Results (Last 72 Hours)     Procedure Component Value Units Date/Time    CT Chest Pulmonary Embolism [[id number]] Collected: 12/09/21 0409     Updated: 12/09/21 0411    Narrative:      CT CHEST PULMONARY EMBOLISM W CONTRAST    INDICATION:   Sepsis and recurrent pneumonia    TECHNIQUE:   CT angiogram of the chest with IV contrast. 3-D reconstructions were obtained and reviewed.   Radiation dose reduction techniques included automated exposure control or exposure modulation based on body size. Count of known CT and cardiac nuc med studies  performed in previous 12 months: 0.     COMPARISON:   None available.    FINDINGS:   There is adequate opacification of pulmonary arteries. There is right lower lobe pulmonary embolus which is occlusive bases. There is dense consolidation throughout the right lower lobe. There is mild interstitial prominence throughout the lungs there  are some patchy areas of groundglass density in the upper lobes. Aorta is normal in size. There is a small right effusion. There is no adenopathy.          Impression:      Right lower lobe pulmonary embolus which appears occlusive in the lower branches. There is no evidence of pulmonary infarct.    Dense groundglass infiltrate throughout the right lower lobe    Patchy groundglass infiltrates throughout the rest the lungs with diffuse interstitial prominence.    There is no evidence of right heart  enlargement    I discussed these results with Dr. Naranjo at the time of this dictation    Signer Name: Kevin Barrios MD   Signed: 12/9/2021 4:09 AM   Workstation Name: [email or web address]    [facility]    XR Chest 1 View [[id number]] Collected: 12/09/21 0326     Updated: 12/09/21 0328    Narrative:      CR Chest 1 Vw    INDICATION:   Shortness during cough, follow pneumonia     COMPARISON:    220    FINDINGS:  Portable AP view(s) of the chest. There Are low lung volumes with mild right base atelectasis or infiltrate. Left lung appears clear. Heart size normal. Bones are      Impression:      Low inspiratory volumes with probable right lower lobe infiltrate or atelectasis    Signer Name: Kevin Barrios MD   Signed: 12/9/2021 3:26 AM   Workstation Name: "[employer]    Radiology [facility]          Results for orders placed during the hospital encounter of 12/09/21    Adult Transthoracic Echo Complete W/ Cont if Necessary Per Protocol    Interpretation Summary  · The left ventricular cavity is moderately dilated.  · Left ventricular ejection fraction appears to be less than 20%. Left ventricular systolic function is severely decreased.  · The aortic valve is structurally normal with no regurgitation or stenosis present.  · The mitral valve is grossly normal in structure. Mild to moderate mitral valve regurgitation is present. No significant mitral valve stenosis is present.  · There is no evidence of pericardial effusion.      Assessment/Plan:   Acute hypoxic respiratory failure and sepsis related to pulmonary embolism as well as fairly dense right pneumonia.  Patient is on Zosyn doxycycline, will check urine Legionella, will check MRSA screen, if these are negative could consider stopping doxycycline.  This may be an aspiration pneumonia, speech therapy is going to evaluate.  She is on IV heparin for pulmonary embolus.  Venous Dopplers are pending, there is no evidence of right heart strain.   Troponins were negative as well.  Holding on DuoNeb secondary to tachycardia continue incentive spirometry.    Acute HFrEF.  Patient creatinine is slightly above baseline BUN is elevated.  I am holding on diuresis, cardiology is going to see, she did not have edema.  She may need further physiologic work-up but will leave this to the discretion of cardiology.  This appears to be more global.  Will check thyroid status in the a.m.    Atrial fibrillation RVR, check thyroid status, patient is on amiodarone, Cardizem IV and heparin IV for stroke prevention.  Cardiology is going to evaluate.  I did discuss with Dr. Roth, no plans for cardioversion today, can advance diet at speech discretion.    DVT prophylaxis, IV heparin will serve for this as well    Methamphetamine skin    Elevated transaminases, hepatitis profile negative, I am going to get a liver ultrasound, discussed with cardiology, will leave decision about amiodarone to their discretion, transaminases were elevated on admission but rising.  She is not on a statin.    Hyponatremia, improving    Disposition Home    Natanael Toscano MD

## 2021-12-11 NOTE — PROGRESS NOTES
Assisted By: Veronika KNOWLES    CC: Follow-up on multiple medical problems    Interview History/HPI: Patient states that she is doing okay, breathing okay, she is sleeping but easily awakened.  She states that she is having some epigastric abdominal discomfort.  She really does not know exacerbating relieving factors and cannot characterize this further.  Pain is intermittent and not that intense.  No radiation this pain but she did have some emesis last night.  She states she does still have her gallbladder that she knows of.  She states she did have a bowel movement after the suppository last night.  No fever.  She did have some hypotension overnight    ROS:     Vitals:    12/11/21 0902   BP: 101/69   Pulse: 82   Resp: 20   Temp:    SpO2: 98%         Intake/Output Summary (Last 24 hours) at 12/11/2021 1047  Last data filed at 12/11/2021 0810  Gross per 24 hour   Intake 1897.24 ml   Output 200 ml   Net 1697.24 ml       EXAM: Temperature 97.2.  Map was still at blood pressure as low as last night 67.  Sclera mildly icteric pupils are equal mood is good she appears no distress not using accessory muscles she is on 2 L.  Lungs have bilateral breath sounds that have coarse wheezing, heart controlled irregularly irregular rhythm without murmur, abdomen is soft and overall completely benign nondistended nontender no peritoneal signs.  No fluid wave is noted, extremities no edema, the rash probably related to meth use noted.  She moves all extremities on command, no other rashes.  In the 12 hours of last night shift I do not see any urine output recorded however she does not have a catheter in.  Catheter is being ordered to monitor.  The aging bruise on the left flank appears stable      EKG:     Tele: Appears to be atrial flutter with a controlled rate    LABS:     Lab Results (last 48 hours)     Procedure Component Value Units Date/Time    CK [[id number]]  (Abnormal) Collected: 12/11/21 1005    Specimen: Blood Updated: 12/11/21  1028     Creatine Kinase 2,338 U/L     Comprehensive Metabolic Panel [[id number]] Collected: 12/11/21 1008    Specimen: Blood Updated: 12/11/21 1018    Acetaminophen Level [[id number]]  (Normal) Collected: 12/11/21 0445    Specimen: Blood Updated: 12/11/21 0834     Acetaminophen <5.0 mcg/mL     Protime-INR [[id number]]  (Abnormal) Collected: 12/11/21 0444    Specimen: Blood Updated: 12/11/21 0731     Protime 33.1 Seconds      INR 3.21    Narrative:      Suggested INR therapeutic range for stable oral anticoagulant therapy:    Low Intensity therapy:   1.5-2.0  Moderate Intensity therapy:   2.0-3.0  High Intensity therapy:   2.5-4.0    POC Glucose Once [[id number]]  (Normal) Collected: 12/11/21 0534    Specimen: Blood Updated: 12/11/21 0545     Glucose 98 mg/dL      Comment: Meter: [id number] : [id number] [facility]       Troponin [[id number]]  (Normal) Collected: 12/11/21 0445    Specimen: Blood Updated: 12/11/21 0520     Troponin T 0.019 ng/mL     Narrative:      Troponin T Reference Range:  <= 0.03 ng/mL-   Negative for AMI  >0.03 ng/mL-     Abnormal for myocardial necrosis.  Clinicians would have to utilize clinical acumen, EKG, Troponin and serial changes to determine if it is an Acute Myocardial Infarction or myocardial injury due to an underlying chronic condition.       Results may be falsely decreased if patient taking Biotin.      aPTT [[id number]]  (Abnormal) Collected: 12/11/21 0444    Specimen: Blood Updated: 12/11/21 0508     PTT 50.6 seconds     Narrative:      PTT Heparin Therapeutic Range:  59 - 95 seconds      Blood Culture - Blood, Arm, Left [[id number]]  (Normal) Collected: 12/09/21 0256    Specimen: Blood from Arm, Left Updated: 12/11/21 0315     Blood Culture No growth at 2 days    Blood Culture - Blood, Arm, Left [[id number]]  (Normal) Collected: 12/09/21 0233    Specimen: Blood from Arm, Left Updated: 12/11/21 0245     Blood Culture No growth at 2 days    Comprehensive Metabolic Panel  [[id number]]  (Abnormal) Collected: 12/11/21 0124    Specimen: Blood Updated: 12/11/21 0238     Glucose 139 mg/dL      BUN 35 mg/dL      Creatinine 2.12 mg/dL      Sodium 127 mmol/L      Potassium 5.1 mmol/L      Chloride 94 mmol/L      CO2 16.3 mmol/L      Calcium 8.3 mg/dL      Total Protein 7.4 g/dL      Albumin 3.08 g/dL      ALT (SGPT) 1,563 U/L      AST (SGOT) 4,195 U/L      Alkaline Phosphatase 150 U/L      Total Bilirubin 4.1 mg/dL      eGFR Non African Amer 25 mL/min/1.73      Globulin 4.3 gm/dL      A/G Ratio 0.7 g/dL      BUN/Creatinine Ratio 16.5     Anion Gap 16.7 mmol/L     Narrative:      GFR Normal >60  Chronic Kidney Disease <60  Kidney Failure <15      Troponin [[id number]]  (Normal) Collected: 12/11/21 0124    Specimen: Blood Updated: 12/11/21 0154     Troponin T 0.015 ng/mL     Narrative:      Troponin T Reference Range:  <= 0.03 ng/mL-   Negative for AMI  >0.03 ng/mL-     Abnormal for myocardial necrosis.  Clinicians would have to utilize clinical acumen, EKG, Troponin and serial changes to determine if it is an Acute Myocardial Infarction or myocardial injury due to an underlying chronic condition.       Results may be falsely decreased if patient taking Biotin.      CK [[id number]]  (Abnormal) Collected: 12/10/21 2251    Specimen: Blood Updated: 12/10/21 2349     Creatine Kinase 2,714 U/L     TSH [[id number]]  (Normal) Collected: 12/10/21 2251    Specimen: Blood Updated: 12/10/21 2342     TSH 4.040 uIU/mL     T4, Free [[id number]]  (Abnormal) Collected: 12/10/21 2251    Specimen: Blood Updated: 12/10/21 2342     Free T4 0.81 ng/dL     Narrative:      Results may be falsely increased if patient taking Biotin.      C-reactive Protein [[id number]]  (Abnormal) Collected: 12/10/21 2251    Specimen: Blood Updated: 12/10/21 2337     C-Reactive Protein 27.13 mg/dL     Magnesium [[id number]]  (Normal) Collected: 12/10/21 2251    Specimen: Blood Updated: 12/10/21 4921     Magnesium 2.5 mg/dL     aPTT  [[id number]]  (Abnormal) Collected: 12/10/21 2251    Specimen: Blood Updated: 12/10/21 2320     PTT 59.1 seconds     Narrative:      PTT Heparin Therapeutic Range:  59 - 95 seconds      CBC & Differential [[id number]]  (Abnormal) Collected: 12/10/21 2251    Specimen: Blood Updated: 12/10/21 2318    Narrative:      The following orders were created for panel order CBC & Differential.  Procedure                               Abnormality         Status                     ---------                               -----------         ------                     CBC Auto Differential[[id number]]        Abnormal            Final result               Scan Slide[[id number]]                                                                    Please view results for these tests on the individual orders.    CBC Auto Differential [[id number]]  (Abnormal) Collected: 12/10/21 2251    Specimen: Blood Updated: 12/10/21 2318     WBC 31.70 10*3/mm3      RBC 4.01 10*6/mm3      Hemoglobin 9.8 g/dL      Hematocrit 33.7 %      MCV 84.0 fL      MCH 24.4 pg      MCHC 29.1 g/dL      RDW 18.7 %      RDW-SD 56.2 fl      MPV 11.3 fL      Platelets 256 10*3/mm3      Neutrophil % 89.1 %      Lymphocyte % 3.9 %      Monocyte % 5.6 %      Eosinophil % 0.0 %      Basophil % 0.2 %      Immature Grans % 1.2 %      Neutrophils, Absolute 28.26 10*3/mm3      Lymphocytes, Absolute 1.24 10*3/mm3      Monocytes, Absolute 1.76 10*3/mm3      Eosinophils, Absolute 0.01 10*3/mm3      Basophils, Absolute 0.05 10*3/mm3      Immature Grans, Absolute 0.38 10*3/mm3      nRBC 0.7 /100 WBC     POC Glucose Once [[id number]]  (Abnormal) Collected: 12/10/21 2105    Specimen: Blood Updated: 12/10/21 2112     Glucose 204 mg/dL      Comment: RN Notified Meter: [id number] : [id number] [facility]       MRSA Screen, PCR (Inpatient) - Swab, Nares [[id number]]  (Normal) Collected: 12/10/21 1559    Specimen: Swab from Nares Updated: 12/10/21 1730     MRSA PCR Negative     Staph  aureus by PCR Negative    Legionella Antigen, Urine - Urine, Urine, Clean Catch [[id number]]  (Normal) Collected: 12/10/21 1630    Specimen: Urine, Clean Catch Updated: 12/10/21 1715     LEGIONELLA ANTIGEN, URINE Negative    Narrative:      Presumptive negative for L. pneumophilia serogroup 1 antigen, suggesting no recent or current infection.    POC Glucose Once [[id number]]  (Abnormal) Collected: 12/10/21 1703    Specimen: Blood Updated: 12/10/21 1714     Glucose 195 mg/dL      Comment: Meter: [id number] : [id number] deng reno       aPTT [[id number]]  (Abnormal) Collected: 12/10/21 1506    Specimen: Blood Updated: 12/10/21 1606     PTT 44.2 seconds     Narrative:      PTT Heparin Therapeutic Range:  59 - 95 seconds      POC Glucose Once [[id number]]  (Normal) Collected: 12/10/21 1221    Specimen: Blood Updated: 12/10/21 1248     Glucose 104 mg/dL      Comment: Meter: [id number] : [id number] deng rowdy       aPTT [[id number]]  (Abnormal) Collected: 12/10/21 0723    Specimen: Blood Updated: 12/10/21 0832     PTT 91.7 seconds     Narrative:      PTT Heparin Therapeutic Range:  59 - 95 seconds      POC Glucose Once [[id number]]  (Abnormal) Collected: 12/10/21 0530    Specimen: Blood Updated: 12/10/21 0536     Glucose 136 mg/dL      Comment: RN Notified Meter: [id number] : [id number] [facility]       Troponin [[id number]]  (Normal) Collected: 12/10/21 0303    Specimen: Blood Updated: 12/10/21 0332     Troponin T <0.010 ng/mL     Narrative:      Troponin T Reference Range:  <= 0.03 ng/mL-   Negative for AMI  >0.03 ng/mL-     Abnormal for myocardial necrosis.  Clinicians would have to utilize clinical acumen, EKG, Troponin and serial changes to determine if it is an Acute Myocardial Infarction or myocardial injury due to an underlying chronic condition.       Results may be falsely decreased if patient taking Biotin.      Comprehensive Metabolic Panel [[id number]]  (Abnormal) Collected: 12/10/21 0044     Specimen: Blood Updated: 12/10/21 0145     Glucose 198 mg/dL      BUN 23 mg/dL      Creatinine 1.05 mg/dL      Sodium 131 mmol/L      Potassium 4.4 mmol/L      Chloride 98 mmol/L      CO2 16.5 mmol/L      Calcium 8.4 mg/dL      Total Protein 7.2 g/dL      Albumin 2.80 g/dL      ALT (SGPT) 222 U/L      AST (SGOT) 242 U/L      Alkaline Phosphatase 180 U/L      Total Bilirubin 3.1 mg/dL      eGFR Non African Amer 57 mL/min/1.73      Globulin 4.4 gm/dL      A/G Ratio 0.6 g/dL      BUN/Creatinine Ratio 21.9     Anion Gap 16.5 mmol/L     Narrative:      GFR Normal >60  Chronic Kidney Disease <60  Kidney Failure <15      C-reactive Protein [[id number]]  (Abnormal) Collected: 12/10/21 0044    Specimen: Blood Updated: 12/10/21 0143     C-Reactive Protein 30.87 mg/dL     Magnesium [[id number]]  (Normal) Collected: 12/10/21 0044    Specimen: Blood Updated: 12/10/21 0143     Magnesium 2.1 mg/dL     aPTT [[id number]]  (Abnormal) Collected: 12/10/21 0044    Specimen: Blood Updated: 12/10/21 0127     PTT 37.3 seconds     Narrative:      PTT Heparin Therapeutic Range:  59 - 95 seconds      CBC & Differential [[id number]]  (Abnormal) Collected: 12/10/21 0044    Specimen: Blood Updated: 12/10/21 0115    Narrative:      The following orders were created for panel order CBC & Differential.  Procedure                               Abnormality         Status                     ---------                               -----------         ------                     CBC Auto Differential[[id number]]        Abnormal            Final result                 Please view results for these tests on the individual orders.    CBC Auto Differential [[id number]]  (Abnormal) Collected: 12/10/21 0044    Specimen: Blood Updated: 12/10/21 0115     WBC 19.78 10*3/mm3      RBC 4.21 10*6/mm3      Hemoglobin 10.4 g/dL      Hematocrit 34.7 %      MCV 82.4 fL      MCH 24.7 pg      MCHC 30.0 g/dL      RDW 18.5 %      RDW-SD 54.0 fl      MPV 10.8 fL      Platelets 267  10*3/mm3      Neutrophil % 91.3 %      Lymphocyte % 4.1 %      Monocyte % 3.9 %      Eosinophil % 0.0 %      Basophil % 0.1 %      Immature Grans % 0.6 %      Neutrophils, Absolute 18.05 10*3/mm3      Lymphocytes, Absolute 0.81 10*3/mm3      Monocytes, Absolute 0.78 10*3/mm3      Eosinophils, Absolute 0.00 10*3/mm3      Basophils, Absolute 0.02 10*3/mm3      Immature Grans, Absolute 0.12 10*3/mm3      nRBC 0.2 /100 WBC     S. Pneumo Ag Urine or CSF - Urine, Urine, Clean Catch [[id number]]  (Normal) Collected: 12/09/21 1802    Specimen: Urine, Clean Catch Updated: 12/09/21 2328     Strep Pneumo Ag Negative    POC Glucose Once [[id number]]  (Normal) Collected: 12/09/21 2250    Specimen: Blood Updated: 12/09/21 2257     Glucose 129 mg/dL      Comment: Meter: [id number] : [id number] [facility] Bermudez       Troponin [[id number]]  (Normal) Collected: 12/09/21 2132    Specimen: Blood Updated: 12/09/21 2156     Troponin T <0.010 ng/mL     Narrative:      Troponin T Reference Range:  <= 0.03 ng/mL-   Negative for AMI  >0.03 ng/mL-     Abnormal for myocardial necrosis.  Clinicians would have to utilize clinical acumen, EKG, Troponin and serial changes to determine if it is an Acute Myocardial Infarction or myocardial injury due to an underlying chronic condition.       Results may be falsely decreased if patient taking Biotin.      Blood Gas, Arterial With Co-Ox [[id number]]  (Abnormal) Collected: 12/09/21 2129    Specimen: Arterial Blood Updated: 12/09/21 2130     Site Left Radial     Casimiro's Test Positive     pH, Arterial 7.423 pH units      pCO2, Arterial 33.3 mm Hg      pO2, Arterial 88.4 mm Hg      HCO3, Arterial 21.8 mmol/L      Base Excess, Arterial -2.1 mmol/L      O2 Saturation, Arterial 97.8 %      Hemoglobin, Blood Gas 10.4 g/dL      Comment: 84 Value below reference range        Hematocrit, Blood Gas 31.9 %      Comment: 84 Value below reference range        Oxyhemoglobin 95.6 %      Methemoglobin 0.00 %       Comment: 84 Value below reference range        Carboxyhemoglobin 2.4 %      A-a Gradiant 63.7 mmHg      CO2 Content 22.8 mmol/L      Temperature 0.0 C      Barometric Pressure for Blood Gas 723 mmHg      Modality Nasal Cannula     FIO2 28 %      Ventilator Mode NA     Note --     Collected by [id number]     Comment: Meter: [id number]     :  [id number]        pH, Temp Corrected --     pCO2, Temperature Corrected --     pO2, Temperature Corrected --    Hepatitis Panel, Acute [[id number]]  (Normal) Collected: 12/09/21 2026    Specimen: Blood Updated: 12/09/21 2106     Hepatitis B Surface Ag Non-Reactive     Hep A IgM Non-Reactive     Hep B C IgM Non-Reactive     Hepatitis C Ab Non-Reactive    Narrative:      Results may be falsely decreased if patient taking Biotin.     Lactic Acid, Plasma [[id number]]  (Abnormal) Collected: 12/09/21 2026    Specimen: Blood Updated: 12/09/21 2053     Lactate 3.6 mmol/L     Hemoglobin A1c [[id number]]  (Abnormal) Collected: 12/09/21 0233    Specimen: Blood from Arm, Left Updated: 12/09/21 2018     Hemoglobin A1C 6.20 %     Narrative:      Hemoglobin A1C Ranges:    Increased Risk for Diabetes  5.7% to 6.4%  Diabetes                     >= 6.5%  Diabetic Goal                < 7.0%    Respiratory Panel, PCR (WITHOUT COVID) - Swab, Nasopharynx [[id number]]  (Normal) Collected: 12/09/21 1636    Specimen: Swab from Nasopharynx Updated: 12/09/21 1809     ADENOVIRUS, PCR Not Detected     Coronavirus 229E Not Detected     Coronavirus HKU1 Not Detected     Coronavirus NL63 Not Detected     Coronavirus OC43 Not Detected     Human Metapneumovirus Not Detected     Human Rhinovirus/Enterovirus Not Detected     Influenza B PCR Not Detected     Parainfluenza Virus 1 Not Detected     Parainfluenza Virus 2 Not Detected     Parainfluenza Virus 3 Not Detected     Parainfluenza Virus 4 Not Detected     Bordetella pertussis pcr Not Detected     Chlamydophila pneumoniae PCR Not Detected     Mycoplasma  pneumo by PCR Not Detected     Influenza A PCR Not Detected     RSV, PCR Not Detected     Bordetella parapertussis PCR Not Detected    Narrative:      The coronavirus on the RVP is NOT COVID-19 and is NOT indicative of infection with COVID-19.    In the setting of a positive respiratory panel with a viral infection PLUS a negative procalcitonin without other underlying concern for bacterial infection, consider observing off antibiotics or discontinuation of antibiotics and continue supportive care. If the respiratory panel is positive for atypical bacterial infection (Bordetella pertussis, Chlamydophila pneumoniae, or Mycoplasma pneumoniae), consider antibiotic de-escalation to target atypical bacterial infection.    aPTT [[id number]]  (Normal) Collected: 12/09/21 1752    Specimen: Blood from Arm, Left Updated: 12/09/21 1807     PTT 30.5 seconds     Narrative:      PTT Heparin Therapeutic Range:  59 - 95 seconds      Troponin [[id number]]  (Normal) Collected: 12/09/21 1636    Specimen: Blood from Arm, Left Updated: 12/09/21 1708     Troponin T <0.010 ng/mL     Narrative:      Troponin T Reference Range:  <= 0.03 ng/mL-   Negative for AMI  >0.03 ng/mL-     Abnormal for myocardial necrosis.  Clinicians would have to utilize clinical acumen, EKG, Troponin and serial changes to determine if it is an Acute Myocardial Infarction or myocardial injury due to an underlying chronic condition.       Results may be falsely decreased if patient taking Biotin.      aPTT [[id number]]  (Normal) Collected: 12/09/21 1110    Specimen: Blood from Arm, Right Updated: 12/09/21 1126     PTT 31.7 seconds     Narrative:      PTT Heparin Therapeutic Range:  59 - 95 seconds      Protime-INR [[id number]]  (Abnormal) Collected: 12/09/21 1110    Specimen: Blood from Arm, Right Updated: 12/09/21 1126     Protime 20.1 Seconds      INR 1.66    Narrative:      Suggested INR therapeutic range for stable oral anticoagulant therapy:    Low Intensity  therapy:   1.5-2.0  Moderate Intensity therapy:   2.0-3.0  High Intensity therapy:   2.5-4.0               Radiology:    Imaging Results (Last 72 Hours)     Procedure Component Value Units Date/Time    US Liver [[id number]] Collected: 12/11/21 0758     Updated: 12/11/21 0800    Narrative:      US Liver Or Hepatic    INDICATION:   Elevated transaminase. History of pulmonary embolus.    COMPARISON:   None available.    FINDINGS:    LIVER: The echogenicity and echotexture of the hepatic parenchyma is within normal limits. No hepatic mass. The intrahepatic bile ducts are normal in caliber. The common duct is normal in size at the talat hepatis measuring 2.1 mm.        Impression:      Negative hepatic ultrasound.    Signer Name: Sourav Carrillo MD   Signed: 12/11/2021 7:58 AM   Workstation Name: [id number]    [facility]    US Venous Doppler Lower Extremity Bilateral (duplex) [[id number]] Collected: 12/11/21 0756     Updated: 12/11/21 0758    Narrative:      US Veins LE Duplex BILAT    HISTORY:   Pulmonary embolus. Looking for source.    TECHNIQUE:   Real-time ultrasound was performed of both lower extremities utilizing spectral and color Doppler with compression and augmentation techniques.    COMPARISON:  None available.    FINDINGS:  Right Lower Extremity:  There is no deep venous thrombus seen in the right lower extremity, including the right common femoral veins, right femoral veins and right popliteal veins.  Normal compressibility and respiratory phasicity was visualized.  No calf vein thrombus. Greater  saphenous vein is patent.    Left Lower Extremity:  There is no deep venous thrombus seen in the left lower extremity, including the left common femoral veins, left femoral veins and left popliteal veins.   Normal compressibility and respiratory phasicity was visualized.  No calf vein thrombus. Greater  saphenous vein is patent.        Impression:      No deep venous thrombus seen in either  lower extremity.    Signer Name: Sourav Carrillo MD   Signed: 12/11/2021 7:56 AM   Workstation Name: [email or web address]    [facility]    CT Chest Pulmonary Embolism [[id number]] Collected: 12/09/21 0409     Updated: 12/09/21 0411    Narrative:      CT CHEST PULMONARY EMBOLISM W CONTRAST    INDICATION:   Sepsis and recurrent pneumonia    TECHNIQUE:   CT angiogram of the chest with IV contrast. 3-D reconstructions were obtained and reviewed.   Radiation dose reduction techniques included automated exposure control or exposure modulation based on body size. Count of known CT and cardiac nuc med studies  performed in previous 12 months: 0.     COMPARISON:   None available.    FINDINGS:   There is adequate opacification of pulmonary arteries. There is right lower lobe pulmonary embolus which is occlusive bases. There is dense consolidation throughout the right lower lobe. There is mild interstitial prominence throughout the lungs there  are some patchy areas of groundglass density in the upper lobes. Aorta is normal in size. There is a small right effusion. There is no adenopathy.          Impression:      Right lower lobe pulmonary embolus which appears occlusive in the lower branches. There is no evidence of pulmonary infarct.    Dense groundglass infiltrate throughout the right lower lobe    Patchy groundglass infiltrates throughout the rest the lungs with diffuse interstitial prominence.    There is no evidence of right heart enlargement    I discussed these results with Dr. Naranjo at the time of this dictation    Signer Name: Kevin Barrios MD   Signed: 12/9/2021 4:09 AM   Workstation Name: [facility]    [facility]    XR Chest 1 View [[id number]] Collected: 12/09/21 0326     Updated: 12/09/21 0328    Narrative:      CR Chest 1 Vw    INDICATION:   Shortness during cough, follow pneumonia     COMPARISON:    220    FINDINGS:  Portable AP view(s) of the chest. There Are low lung  volumes with mild right base atelectasis or infiltrate. Left lung appears clear. Heart size normal. Bones are      Impression:      Low inspiratory volumes with probable right lower lobe infiltrate or atelectasis    Signer Name: Kevin Barrios MD   Signed: 12/9/2021 3:26 AM   Workstation Name: [id number]    [facility]          Results for orders placed during the hospital encounter of 12/09/21    Adult Transthoracic Echo Complete W/ Cont if Necessary Per Protocol    Interpretation Summary  · The left ventricular cavity is moderately dilated.  · Left ventricular ejection fraction appears to be less than 20%. Left ventricular systolic function is severely decreased.  · The aortic valve is structurally normal with no regurgitation or stenosis present.  · The mitral valve is grossly normal in structure. Mild to moderate mitral valve regurgitation is present. No significant mitral valve stenosis is present.  · There is no evidence of pericardial effusion.      Assessment/Plan:   Acute hypoxic or operatory failure secondary to severe sepsis as well as significant dense right pneumonia and pulmonary embolism.  Cultures remain negative, urinary Legionella negative atypical screens negative, I am stopping doxycycline.  Patient was started on meropenem by pulmonology yesterday, she is also on steroids which accounts for the rise in her white count.  CRP is starting to come down.  We have not had escalated oxygen remains on 2 L, she is on IV heparin for her pulmonary embolus and heparin protocol being followed.  Venous Doppler is negative.    Rhabdomyolysis, patient is not on a statin, she does have an aging bruise on her left flank but she does not state that she has had any falls.  I am holding on hydration as her CPK is less than 5000 however nephrology is going to follow, CPK has risen, she has a poor EF we will leave decision about IV fluids to nephrology.  Patient did receive 1 dose of Lasix at 1400  yesterday.    CELSO, I am checking a stat CT abdomen without contrast looking for any evidence of hydronephrosis.  Nephrology has been consulted.  Repeat chemistries pending.  Odom catheter is going to be placed as obviously we need to track output closely.    Transaminitis, she is having some mild tenderness in the midepigastric area at least by complaint but I did thought her abdomen was overall benign.  This may be shock liver.  Liver ultrasound did not show any abnormality.  I am checking CT abdomen as above, also can check an ultrasound of her gallbladder.  Lipase pending.  Acetaminophen level less than 5.    Now hyperkalemic, I will give Lokelma times once, insulin and glucose, albuterol neb, recheck 1600, follow.  Severe cardiomyopathy with a EF of 20%.  Cannot use ACE or ARB or diurese due to borderline hypotension and renal insufficiency.  Cardiology is following.    Atrial fibrillation, rate controlled, following IV heparin, cardiology following.    Coagulopathy, may be due to liver, I am going to give oral vitamin K today recheck in a.m.    Drop in bicarb, she was previously alkalotic, I am checking an ABG.  She would be at risk for acidosis with renal failure and this may contribute to hyperkalemia.  Could consider bicarb if this was the case.    Hyponatremia, improved    Morbid obesity by BMI, overall adversely affecting her health    Glucose is controlled    Disposition Home    Natanael Toscano MD

## 2021-12-11 NOTE — PROGRESS NOTES
Briefly discussed with GI at [facility] about the transaminase elevation.  After discussion, I also reviewed the liver ultrasound with Dr. Arvizu, patient has dampened flow in the portal vein but it is patent, this may be just from venous congestion from her poor EF.  CT abdomen really shows cirrhosis but no evidence of portal vein thrombosis no evidence of liver obstruction.  The AST actually is starting to come down.  She recommended we check EBV PCR, confirm that there was hepatic flow, she stated also meth can cause some hepatic changes and the low flow state could cause some ischemic type changes as well.  We'll continue to monitor on a daily basis, await nephrology input for creatinine, I was going to give vitamin K but since she has a thrombosis on the hold on this at this time.

## 2021-12-11 NOTE — CONSULTS
Nephrology Consultation Note    Referring Provider: Dr. Toscano  Reason for Consultation: Acute renal failure    Chief complaint shortness of breath    Subjective .     History of present illness: 46-year-old white female patient presented to the hospital emergency room on 9 December with shortness of breath.  Patient had been started on antibiotics 10 days prior to presentation for pneumonia and reportedly was improving before she started feeling bad again.  There is no fever there was reported.  This history obtained from the chart.    The patient herself is not able to talk much.  She is a bit confused.  She is tachypneic.  She falls asleep during my evaluation.  When I arouse her she mumbles and falls back asleep.  I do not think I can rely on what she is stating.    I note that her creatinine was normal at 1.05 mg/dL but higher than her baseline of approximately 0.7 mg/dL at presentation and has gone up to 2.2 this morning..  She has significant hepatic abnormalities of AST and ALT that have developed after hospitalization.  Noted that the patient was markedly tachycardic at presentation, but late night December 10 she has actually become relatively bradycardic and has suffered a drop in blood pressure.  She also has an abnormal coagulation profile on admission.  Difficult to comment on the latter anymore as she is on heparin.    The patient probably has not had a bowel movement in the last 24 hours if not longer.  Nurses are not able to corroborate the same.  She has become relatively oliguric over the last 24 hours but urine output charting has been poor and a Oodm's catheter has been introduced this afternoon.    No blood loss reported.  Mild rhabdomyolysis noted.    Hemodynamic data reviewed     Medications with potential for nephrotoxicity/hepatotoxicity received include IV dye and Lasix and amiodarone        History  Past Medical History:   Diagnosis  "Date   • Asthma    • Diabetes mellitus (HCC)     GDM last pregnancy   • History of atrial fibrillation    • Substance abuse (HCC)    ,   Past Surgical History:   Procedure Laterality Date   •  SECTION      x 3   • HIP FRACTURE SURGERY  2017   , History reviewed. No pertinent family history.,   Social History     Tobacco Use   • Smoking status: Never Smoker   • Smokeless tobacco: Current User     Types: Chew   Vaping Use   • Vaping Use: Never used   Substance Use Topics   • Alcohol use: No   • Drug use: Yes     Types: Hydrocodone, Codeine, Methamphetamines     Comment: \"stopped around 6 weeks pregnant\"    and Allergies:  Patient has no known allergies.      Vital Signs   Temp:  [97.2 °F (36.2 °C)-98.3 °F (36.8 °C)] 97.2 °F (36.2 °C)  Heart Rate:  [] 78  Resp:  [12-26] 13  BP: ()/() 130/83    Physical Exam:     General Appearance:   Confused with mild distress   Head:    Normocephalic, without obvious abnormality   Eyes:            no icterus, no pallor, pupils CCERLA   Ears:    Ears appear intact    Throat:    oral mucosa moist   Neck:  Accessory muscle retraction       Lungs:    Scattered crackles and wheeze    Heart:   Irregular rhythm   Chest Wall:    No abnormalities observed   Abdomen:     Normal bowel sounds, gastric tenderness without distention   Rectal:     Deferred   Extremities:  No edema       Skin:   No petechia       Neurologic:   Cr Ns grossly intact, moves all extremities.     Results Review:   I reviewed the patient's new clinical results.      Lab Results (last 24 hours)     Procedure Component Value Units Date/Time    POC Glucose Once [[id number]]  (Abnormal) Collected: 21 1646    Specimen: Blood Updated: 21 1653     Glucose 192 mg/dL      Comment: Meter: [id number] : [id number] BINTA CORNELIUS       Digoxin Level [[id number]]  (Normal) Collected: 21 1455    Specimen: Blood Updated: 21 1518     Digoxin 0.60 ng/mL     Protein / Creatinine Ratio, " Urine - Urine, Clean Catch [[id number]] Collected: 12/11/21 1328    Specimen: Urine, Clean Catch Updated: 12/11/21 1332    Microalbumin / Creatinine Urine Ratio - Urine, Clean Catch [[id number]] Collected: 12/11/21 1328    Specimen: Urine, Clean Catch Updated: 12/11/21 1332    aPTT [[id number]]  (Abnormal) Collected: 12/11/21 1153    Specimen: Blood Updated: 12/11/21 1236     PTT 52.2 seconds     Narrative:      PTT Heparin Therapeutic Range:  59 - 95 seconds      POC Glucose Once [[id number]]  (Abnormal) Collected: 12/11/21 1205    Specimen: Blood Updated: 12/11/21 1211     Glucose 197 mg/dL      Comment: Meter: [id number] : [id number] BINTA ASHLI       Blood Gas, Arterial With Co-Ox [[id number]]  (Abnormal) Collected: 12/11/21 1138    Specimen: Arterial Blood Updated: 12/11/21 1149     Site Right Radial     Casimiro's Test Positive     pH, Arterial 7.331 pH units      Comment: 84 Value below reference range        pCO2, Arterial 37.3 mm Hg      pO2, Arterial 79.0 mm Hg      Comment: 84 Value below reference range        HCO3, Arterial 19.7 mmol/L      Comment: 84 Value below reference range        Base Excess, Arterial -5.7 mmol/L      O2 Saturation, Arterial 94.5 %      Hemoglobin, Blood Gas 10.7 g/dL      Comment: 84 Value below reference range        Hematocrit, Blood Gas 32.9 %      Comment: 84 Value below reference range        Oxyhemoglobin 93.0 %      Comment: 84 Value below reference range        Methemoglobin <-0.10 %      Comment: 94 Value below reportable range < _0.1        Carboxyhemoglobin 1.8 %      A-a Gradiant 67.8 mmHg      CO2 Content 20.8 mmol/L      Temperature 0.0 C      Barometric Pressure for Blood Gas 720 mmHg      Modality Nasal Cannula     FIO2 28 %      Flow Rate 2.0 lpm      Ventilator Mode NA     Note Read back and acknowledge     Notified Who DR MATAMOROS     Notified By [id number]     Collected by [id number]     Comment: Meter: [id number]     :  [id number]        pH, Temp Corrected --      pCO2, Temperature Corrected --     pO2, Temperature Corrected --    Lipase [[id number]]  (Abnormal) Collected: 12/11/21 0445    Specimen: Blood Updated: 12/11/21 1107     Lipase 540 U/L     Comprehensive Metabolic Panel [[id number]]  (Abnormal) Collected: 12/11/21 1008    Specimen: Blood Updated: 12/11/21 1057     Glucose 97 mg/dL      BUN 44 mg/dL      Creatinine 2.22 mg/dL      Sodium 130 mmol/L      Potassium 6.1 mmol/L      Chloride 96 mmol/L      CO2 16.1 mmol/L      Calcium 8.4 mg/dL      Total Protein 7.3 g/dL      Albumin 2.94 g/dL      ALT (SGPT) 1,823 U/L      AST (SGOT) 3,887 U/L      Alkaline Phosphatase 150 U/L      Total Bilirubin 4.6 mg/dL      eGFR Non African Amer 24 mL/min/1.73      Globulin 4.4 gm/dL      A/G Ratio 0.7 g/dL      BUN/Creatinine Ratio 19.8     Anion Gap 17.9 mmol/L     Narrative:      GFR Normal >60  Chronic Kidney Disease <60  Kidney Failure <15      CK [[id number]]  (Abnormal) Collected: 12/11/21 0445    Specimen: Blood Updated: 12/11/21 1028     Creatine Kinase 2,338 U/L     Acetaminophen Level [[id number]]  (Normal) Collected: 12/11/21 0445    Specimen: Blood Updated: 12/11/21 0834     Acetaminophen <5.0 mcg/mL     Protime-INR [[id number]]  (Abnormal) Collected: 12/11/21 0444    Specimen: Blood Updated: 12/11/21 0731     Protime 33.1 Seconds      INR 3.21    Narrative:      Suggested INR therapeutic range for stable oral anticoagulant therapy:    Low Intensity therapy:   1.5-2.0  Moderate Intensity therapy:   2.0-3.0  High Intensity therapy:   2.5-4.0    POC Glucose Once [[id number]]  (Normal) Collected: 12/11/21 0534    Specimen: Blood Updated: 12/11/21 0545     Glucose 98 mg/dL      Comment: Meter: [id number] : [id number] [facility]       Troponin [[id number]]  (Normal) Collected: 12/11/21 0445    Specimen: Blood Updated: 12/11/21 0520     Troponin T 0.019 ng/mL     Narrative:      Troponin T Reference Range:  <= 0.03 ng/mL-   Negative for AMI  >0.03 ng/mL-     Abnormal  for myocardial necrosis.  Clinicians would have to utilize clinical acumen, EKG, Troponin and serial changes to determine if it is an Acute Myocardial Infarction or myocardial injury due to an underlying chronic condition.       Results may be falsely decreased if patient taking Biotin.      aPTT [[id number]]  (Abnormal) Collected: 12/11/21 0444    Specimen: Blood Updated: 12/11/21 0508     PTT 50.6 seconds     Narrative:      PTT Heparin Therapeutic Range:  59 - 95 seconds      Blood Culture - Blood, Arm, Left [[id number]]  (Normal) Collected: 12/09/21 0256    Specimen: Blood from Arm, Left Updated: 12/11/21 0315     Blood Culture No growth at 2 days    Blood Culture - Blood, Arm, Left [[id number]]  (Normal) Collected: 12/09/21 0233    Specimen: Blood from Arm, Left Updated: 12/11/21 0245     Blood Culture No growth at 2 days    Comprehensive Metabolic Panel [[id number]]  (Abnormal) Collected: 12/11/21 0124    Specimen: Blood Updated: 12/11/21 0238     Glucose 139 mg/dL      BUN 35 mg/dL      Creatinine 2.12 mg/dL      Sodium 127 mmol/L      Potassium 5.1 mmol/L      Chloride 94 mmol/L      CO2 16.3 mmol/L      Calcium 8.3 mg/dL      Total Protein 7.4 g/dL      Albumin 3.08 g/dL      ALT (SGPT) 1,563 U/L      AST (SGOT) 4,195 U/L      Alkaline Phosphatase 150 U/L      Total Bilirubin 4.1 mg/dL      eGFR Non African Amer 25 mL/min/1.73      Globulin 4.3 gm/dL      A/G Ratio 0.7 g/dL      BUN/Creatinine Ratio 16.5     Anion Gap 16.7 mmol/L     Narrative:      GFR Normal >60  Chronic Kidney Disease <60  Kidney Failure <15      Troponin [[id number]]  (Normal) Collected: 12/11/21 0124    Specimen: Blood Updated: 12/11/21 0154     Troponin T 0.015 ng/mL     Narrative:      Troponin T Reference Range:  <= 0.03 ng/mL-   Negative for AMI  >0.03 ng/mL-     Abnormal for myocardial necrosis.  Clinicians would have to utilize clinical acumen, EKG, Troponin and serial changes to determine if it is an Acute Myocardial Infarction or  myocardial injury due to an underlying chronic condition.       Results may be falsely decreased if patient taking Biotin.      CK [[id number]]  (Abnormal) Collected: 12/10/21 2251    Specimen: Blood Updated: 12/10/21 2349     Creatine Kinase 2,714 U/L     TSH [[id number]]  (Normal) Collected: 12/10/21 2251    Specimen: Blood Updated: 12/10/21 2342     TSH 4.040 uIU/mL     T4, Free [[id number]]  (Abnormal) Collected: 12/10/21 2251    Specimen: Blood Updated: 12/10/21 2342     Free T4 0.81 ng/dL     Narrative:      Results may be falsely increased if patient taking Biotin.      C-reactive Protein [[id number]]  (Abnormal) Collected: 12/10/21 2251    Specimen: Blood Updated: 12/10/21 2337     C-Reactive Protein 27.13 mg/dL     Magnesium [[id number]]  (Normal) Collected: 12/10/21 2251    Specimen: Blood Updated: 12/10/21 2337     Magnesium 2.5 mg/dL     aPTT [[id number]]  (Abnormal) Collected: 12/10/21 2251    Specimen: Blood Updated: 12/10/21 2320     PTT 59.1 seconds     Narrative:      PTT Heparin Therapeutic Range:  59 - 95 seconds      CBC & Differential [[id number]]  (Abnormal) Collected: 12/10/21 2251    Specimen: Blood Updated: 12/10/21 2318    Narrative:      The following orders were created for panel order CBC & Differential.  Procedure                               Abnormality         Status                     ---------                               -----------         ------                     CBC Auto Differential[[id number]]        Abnormal            Final result               Scan Slide[[id number]]                                                                    Please view results for these tests on the individual orders.    CBC Auto Differential [[id number]]  (Abnormal) Collected: 12/10/21 2251    Specimen: Blood Updated: 12/10/21 2318     WBC 31.70 10*3/mm3      RBC 4.01 10*6/mm3      Hemoglobin 9.8 g/dL      Hematocrit 33.7 %      MCV 84.0 fL      MCH 24.4 pg      MCHC 29.1 g/dL      RDW 18.7 %       RDW-SD 56.2 fl      MPV 11.3 fL      Platelets 256 10*3/mm3      Neutrophil % 89.1 %      Lymphocyte % 3.9 %      Monocyte % 5.6 %      Eosinophil % 0.0 %      Basophil % 0.2 %      Immature Grans % 1.2 %      Neutrophils, Absolute 28.26 10*3/mm3      Lymphocytes, Absolute 1.24 10*3/mm3      Monocytes, Absolute 1.76 10*3/mm3      Eosinophils, Absolute 0.01 10*3/mm3      Basophils, Absolute 0.05 10*3/mm3      Immature Grans, Absolute 0.38 10*3/mm3      nRBC 0.7 /100 WBC     POC Glucose Once [[id number]]  (Abnormal) Collected: 12/10/21 2105    Specimen: Blood Updated: 12/10/21 2112     Glucose 204 mg/dL      Comment: RN Notified Meter: [id number] : [id number] [facility]             Imaging Results (Last 24 Hours)     Procedure Component Value Units Date/Time    US Gallbladder [[id number]] Collected: 12/11/21 1314     Updated: 12/11/21 1317    Narrative:      EXAMINATION: US GALLBLADDER-         CLINICAL INDICATION:     Abd pain, elevated transaminases; I26.93-Single  subsegmental pulmonary embolism without acute cor pulmonale;  J18.9-Pneumonia, unspecified organism; A41.9-Sepsis, unspecified  organism     TECHNIQUE: Multiplanar gray scale ultrasound of the right upper quadrant  abdomen.     COMPARISON: CT same day      FINDINGS:   Pancreatic bed: Not visualized.  Gallbladder: Gallbladder is unremarkable. No shadowing stones or wall  thickening. No pericholecystic fluid.   Bile ducts: The CBD measures 4.40 mm.  Liver: Liver cirrhosis. Fatty liver.    Fluid: No ascites demonstrated.   Sonographic Sherman's sign: Technologist reports a negative sonographic  Sherman sign.       Impression:      1. Liver cirrhosis.  2. Unremarkable sonographic appearance of gallbladder.     This report was finalized on 12/11/2021 1:15 PM by Dr. Julius Arvizu MD.       CT Abdomen Pelvis Without Contrast [[id number]] Collected: 12/11/21 1144     Updated: 12/11/21 1149    Narrative:      EXAM:    CT Abdomen and Pelvis Without Intravenous  Contrast     EXAM DATE:    12/11/2021 11:18 AM     CLINICAL HISTORY:    CELSO, Increased LFTs; I26.93-Single subsegmental pulmonary embolism  without acute cor pulmonale; J18.9-Pneumonia, unspecified organism;  A41.9-Sepsis, unspecified organism     TECHNIQUE:    Axial computed tomography images of the abdomen and pelvis without  intravenous contrast.  Sagittal and coronal reformatted images were  created and reviewed.  This CT exam was performed using one or more of  the following dose reduction techniques:  automated exposure control,  adjustment of the mA and/or kV according to patient size, and/or use of  iterative reconstruction technique.     COMPARISON:    10/23/2018     FINDINGS:    Lung bases:  Partially consolidative airspace disease right greater  than left lung bases.  Interstitial thickening and groundglass  attenuation both lung bases.    Pleural space:  Small right pleural effusion.    Heart:  Marked cardiomegaly.      ABDOMEN:    Liver:  Liver cirrhosis and fatty changes of the liver are noted.    Gallbladder and bile ducts:  Gallbladder is incompletely distended.   No calcified stones.  No ductal dilation.    Pancreas:  Unremarkable.  No ductal dilation.    Spleen:  Unremarkable.  No splenomegaly.    Adrenals:  Unremarkable.  No mass.    Kidneys and ureters:  Unremarkable.  No obstructing stones.  No  hydronephrosis.    Stomach and bowel:  Gastric distention is noted.  No bowel  obstruction.  No mucosal thickening.      PELVIS:    Appendix:  The appendix is not visualized.    Bladder:  Unremarkable.  No stones.    Reproductive:  Bilateral tubal ligation clips are noted.      ABDOMEN and PELVIS:    Intraperitoneal space:  Trace ascites.  No pneumoperitoneum  identified.    Bones/joints:  Degenerative changes lumbar spine.  No acute fracture.   No dislocation.    Soft tissues:  Anasarca noted diffusely.    Vasculature:  Unremarkable.  No abdominal aortic aneurysm.    Lymph nodes:  Unremarkable.  No  enlarged lymph nodes.       Impression:      1.  Bibasilar pneumonia and changes of CHF/edema.  2.  Small right pleural effusion and marked cardiomegaly.  3.  Diffuse anasarca.  4.  Liver cirrhosis and fatty changes of the liver. Miniscule ascites.  5.  Other nonacute findings as above.     This report was finalized on 12/11/2021 11:46 AM by Dr. Julius Arvizu MD.       US Liver [[id number]] Collected: 12/11/21 0758     Updated: 12/11/21 0800    Narrative:      US Liver Or Hepatic    INDICATION:   Elevated transaminase. History of pulmonary embolus.    COMPARISON:   None available.    FINDINGS:    LIVER: The echogenicity and echotexture of the hepatic parenchyma is within normal limits. No hepatic mass. The intrahepatic bile ducts are normal in caliber. The common duct is normal in size at the talat hepatis measuring 2.1 mm.        Impression:      Negative hepatic ultrasound.    Signer Name: Sourav Carrillo MD   Signed: 12/11/2021 7:58 AM   Workstation Name: [email or web address]    [facility]    US Venous Doppler Lower Extremity Bilateral (duplex) [[id number]] Collected: 12/11/21 0756     Updated: 12/11/21 0758    Narrative:      US Veins LE Duplex BILAT    HISTORY:   Pulmonary embolus. Looking for source.    TECHNIQUE:   Real-time ultrasound was performed of both lower extremities utilizing spectral and color Doppler with compression and augmentation techniques.    COMPARISON:  None available.    FINDINGS:  Right Lower Extremity:  There is no deep venous thrombus seen in the right lower extremity, including the right common femoral veins, right femoral veins and right popliteal veins.  Normal compressibility and respiratory phasicity was visualized.  No calf vein thrombus. Greater  saphenous vein is patent.    Left Lower Extremity:  There is no deep venous thrombus seen in the left lower extremity, including the left common femoral veins, left femoral veins and left popliteal veins.   Normal  compressibility and respiratory phasicity was visualized.  No calf vein thrombus. Greater  saphenous vein is patent.        Impression:      No deep venous thrombus seen in either lower extremity.    Signer Name: Sourav Carrillo MD   Signed: 12/11/2021 7:56 AM   Workstation Name: [id number]    [facility]                    Assessment and Plan:    1.  Acute renal failure with acute tubular necrosis with oliguria  2.  Hypoxic respiratory failure  3.  Metabolic acidosis with elevated lactate  4.  Shock liver  5.  Pulmonary embolism  6.  Pneumonia with sepsis  7.  Left ventricular ejection fraction 20%  8.  Atrial fibrillation now with controlled ventricular rate  9.  Hyperkalemia  10.  Metabolic encephalopathy    We will give the patient thiamine IM.  We will give her lactulose.  Await redraw of potassium.  Discontinue Singulair.  The acute renal failure is probably from sepsis with shock even though her blood pressure is not very low.  The acute hepatic injury could be from shock but could also be from amiodarone infusion.  She is at very high risk for dialysis.  Vitamin K should be withheld I feel, as the patient does have pulmonary embolism.  I would suggest Lasix be withheld.  Agree with broad antibiotic coverage.  I do not think the rhabdomyolysis is significant.  Certainly IV contrast could have also contributed to the acute renal failure as it is within the timeframe and the patient's kidney function was abnormal as compared to her baseline at presentation. CC time 40 minutes    Discussed with Dr. Toscano again.  Will start very low-dose dobutamine as she is oliguric with lactic acidosis and confused with metabolic encephalopathy and peripherally vasoconstricted and with a low ejection fraction              Leon Faye MD  12/11/21  17:38 EST

## 2021-12-11 NOTE — PROGRESS NOTES
Although patient is still oriented, she appears to be getting more encephalopathic.  CT showed that she does have liver cirrhosis at baseline but something cause decompensation.  This may have been a low EF or even a lower blood pressure.  I am repeating chemistries at this time.  Also repeating an ABG.  Patient has multiorgan failure including liver, heart, kidney with low urine output, and possibly some encephalopathy.  She appears and a low flow state due to her decreased EF, discussed with Dr. Vaughn he who suggested dobutamine and I did discuss with Dr. Roth, all of us are in agreement that this is appropriate.  I asked the patient who she wanted me to call and update, she states she is  but she is currently  from her  and she wanted me to call her boyfriend Caron which is the next of kin we have listed in the chart unfortunately he does not have a phone that is working.  I have asked Dr. Bo to follow-up on chemistries and ABG.

## 2021-12-11 NOTE — PLAN OF CARE
Goal Outcome Evaluation:  Plan of Care Reviewed With: patient        Progress: no change  Outcome Summary: Cardizem turned of at 2341 due to HR 55, Pt c/o heartburn during night, Hospitilist notifed and EKG, Trop obtained, see results, HR steady in mid 50's with rythum change to Junctional per EKG, heart burn relived by TUMS per patient, CMP with significant changes reported to hospitalist, ABX changed and 250 ml bolus given see MAR, repeat CMP this morning ordered, will continue to monitor...

## 2021-12-11 NOTE — PLAN OF CARE
Goal Outcome Evaluation:           Progress: improving  Outcome Summary: VSS. Pt remains a febrile this shift. Pt is currently on a heparin gtt infusing at 25.87 unit/kg.hr. Pt has complained of nausea and vomiting. PRN zofran given. PRN tums given for heart burn. Pt has no complaints at this time. Will continue to monitor.

## 2021-12-11 NOTE — PROGRESS NOTES
"  Patient Identification:  Name:  Tiera Mendoza  Age:  46 y.o.  Sex:  female  :  1975  MRN:  [id number]  Visit Number:  [id number]  Primary care provider:  Alan Martin APRN    Reason for follow-up:  Paroxysmal atrial fibrillation and dilated cardiomyopathy    Subjective     A. fib persists and ventricular rate is controlled.  Now the rate is in 80s.  IV Cardizem is off.  BP stable.  New development today he is marked increase in AST and ALT.  Patient had an episode of hypotension yesterday-possible shock liver.  Eliquis has been stopped because of coagulopathy.  Lasix is on hold due to CELSO.      Medication Review:   albuterol, 5 mg, Nebulization, Once  budesonide-formoterol, 2 puff, Inhalation, BID - RT  dextrose, 50 mL, Intravenous, Once   And  insulin regular, 10 Units, Intravenous, Once  doxycycline, 100 mg, Intravenous, Q12H  ipratropium, 0.5 mg, Nebulization, 4x Daily - RT  meropenem, 1 g, Intravenous, Q12H  methylPREDNISolone sodium succinate, 40 mg, Intravenous, Q12H  metoprolol tartrate, 50 mg, Oral, Q12H  montelukast, 10 mg, Oral, Nightly  sodium chloride, 10 mL, Intravenous, Q12H  sodium chloride, 10 mL, Intravenous, Q12H  sodium zirconium cyclosilicate, 10 g, Oral, Once          Vital Sign Min/Max for last 24 hours  Temp  Min: 97.2 °F (36.2 °C)  Max: 98.6 °F (37 °C)   BP  Min: 85/80  Max: 139/91   Pulse  Min: 53  Max: 120   Resp  Min: 14  Max: 28   SpO2  Min: 87 %  Max: 100 %   No data recorded   Weight  Min: 135 kg (298 lb 3.2 oz)  Max: 135 kg (298 lb 3.2 oz)     Flowsheet Rows      First Filed Value   Admission Height 162.6 cm (64\") Documented at 2021   Admission Weight 127 kg (280 lb) Documented at 2021          Objective       Physical Exam:     General Appearance:    Alert, answered my questions appropriately.  No distress.   Head:    Normocephalic, without obvious abnormality, atraumatic   Eyes:            Lids and lashes normal, conjunctivae and sclerae normal, no  "  icterus, no pallor, corneas clear, PERRLA   Ears:    Ears appear intact with no abnormalities noted   Throat:   No oral lesions, no thrush, oral mucosa moist   Neck:   No adenopathy, supple, trachea midline, no thyromegaly, no   carotid bruit, no JVD   Back:     No significant abnormality   Lungs:    Bilateral equal air entry.  Bilateral extensive wheezes were heard.    Heart:   Irregular rhythm.  Normal heart rate.  No murmurs.   Chest Wall:    No abnormalities observed   Abdomen:     Normal bowel sounds, no masses, no organomegaly, soft    non-tender, non-distended, no guarding, no rebound                tenderness         Extremities:  2+ bilateral pedal edema.            Telemetry:  A. fib.  Ventricular rate-80s      Labs  Results from last 7 days   Lab Units 12/10/21  2251 12/10/21  0044 12/09/21  0233   WBC 10*3/mm3 31.70* 19.78* 29.87*   HEMOGLOBIN g/dL 9.8* 10.4* 10.5*   HEMATOCRIT % 33.7* 34.7 33.6*   PLATELETS 10*3/mm3 256 267 318     Results from last 7 days   Lab Units 12/11/21  1008 12/11/21  0124 12/10/21  0044 12/09/21  0233   SODIUM mmol/L 130* 127* 131* 130*   POTASSIUM mmol/L 6.1* 5.1 4.4 4.3   CHLORIDE mmol/L 96* 94* 98 93*   CO2 mmol/L 16.1* 16.3* 16.5* 24.0   BUN mg/dL 44* 35* 23* 22*   CREATININE mg/dL 2.22* 2.12* 1.05* 1.05*   CALCIUM mg/dL 8.4* 8.3* 8.4* 8.7   GLUCOSE mg/dL 97 139* 198* 136*     Results from last 7 days   Lab Units 12/11/21  1008 12/11/21  0124 12/10/21  0044 12/09/21  0233   BILIRUBIN mg/dL 4.6* 4.1* 3.1* 2.8*   ALK PHOS U/L 150* 150* 180* 113   AST (SGOT) U/L 3,887* 4,195* 242* 134*   ALT (SGPT) U/L 1,823* 1,563* 222* 191*     Results from last 7 days   Lab Units 12/10/21  2251 12/10/21  0044   MAGNESIUM mg/dL 2.5 2.1     Results from last 7 days   Lab Units 12/11/21  0444 12/09/21  1110 12/09/21  0426   INR  3.21* 1.66* 1.42*     Results from last 7 days   Lab Units 12/10/21  2251 12/10/21  0044 12/09/21  0233   CRP mg/dL 27.13* 30.87* 26.44*     Results from last 7 days    Lab Units 12/11/21  0445 12/11/21  0124 12/10/21  2251 12/10/21  0303   CK TOTAL U/L 2,338*  --  2,714*  --    TROPONIN T ng/mL 0.019 0.015  --  <0.010     Results from last 7 days   Lab Units 12/09/21  2129   PH, ARTERIAL pH units 7.423   PO2 ART mm Hg 88.4   PCO2, ARTERIAL mm Hg 33.3*   HCO3 ART mmol/L 21.8           Assessment      1.  Paroxysmal A. fib.  VR is controlled.  2.  Acute systolic heart failure.  3.  Dilated cardiomyopathy with EF<20%.  Most likely due to viral illness.  4.  Acute hepatic failure associated with coagulopathy.  Possibly due to shock liver  5.  Acute PE  6.  CELSO  7.  Acute hypoxic respiratory failure due to pneumonia and PE    Plan     1.  Will continue metoprolol 50 mg p.o. twice daily for ventricular rate control  2.  On IV heparin for PE and A. fib.  Patient has coagulopathy.  Will closely monitor for bleed.  3.  As ventricular rate now is fairly controlled and patient developed acute hepatic failure associated with altered coagulation status, plan of electrical cardioversion is deferred at present  4.  Will wait to start GDMT for HFrEF to avoid hypotension to avoid worsening hepatic failure and delay in recovery.      Leia Roth MD [facility]  12/11/21  11:43 EST

## 2021-12-11 NOTE — PROGRESS NOTES
Pharmacy was consulted to dose Merrem for sepsis and pneumonia. Based on an estimated CrCl of 48mL/min, an extended infusion Merrem dose of 1g q12hr has been ordered. Thank you for the consult.    Thank you,   Clyde Wade, PharmD  03:24 EST

## 2021-12-12 NOTE — PLAN OF CARE
Goal Outcome Evaluation:  Plan of Care Reviewed With: patient           Outcome Summary: Pt currently has Dobutamine gtt @5 un; Dopamine @ 10un. Heparin was restarted after CVC placement @ 22.87 un. Pt K was 6.6, MD notified- see orders. Chest x ray was obtained to verify CVC placement. Pt remains on 2 L NC. FC in place, was leaking. Pt often cries out/moans/cussing frequently. Pt transferred to CCU, report given to Britney KNOWLES at 1348.

## 2021-12-12 NOTE — PROGRESS NOTES
"  Patient Identification:  Name:  Tiera Mendoza  Age:  46 y.o.  Sex:  female  :  1975  MRN:  [id number]  Visit Number:  [id number]  Primary care provider:  Alan Martin APRN    Reason for follow-up:  Acute systolic heart failure and persistent A. fib.    Subjective     Patient is drowsy and answers the question.  Not in apparent distress.  Telemetry showed A. fib with ventricular rate in 110s.  Systolic blood pressure is in 90s.  Did not tolerate dobutamine even the small dose due to tachycardia.  Oliguric with acute renal failure.        Medication Review:   budesonide-formoterol, 2 puff, Inhalation, BID - RT  doxycycline, 100 mg, Intravenous, Q12H  ipratropium, 0.5 mg, Nebulization, 4x Daily - RT  lactulose, 30 g, Oral, Daily  meropenem, 1 g, Intravenous, Q12H  methylPREDNISolone sodium succinate, 40 mg, Intravenous, Q12H  metoprolol tartrate, 50 mg, Oral, Q12H  sodium chloride, 10 mL, Intravenous, Q12H  sodium chloride, 10 mL, Intravenous, Q12H  thiamine, 100 mg, Intramuscular, Daily          Vital Sign Min/Max for last 24 hours  Temp  Min: 97.2 °F (36.2 °C)  Max: 97.8 °F (36.6 °C)   BP  Min: 80/39  Max: 135/98   Pulse  Min: 50  Max: 118   Resp  Min: 9  Max: 28   SpO2  Min: 87 %  Max: 100 %   No data recorded   Weight  Min: 132 kg (290 lb 11.2 oz)  Max: 132 kg (290 lb 11.2 oz)     Flowsheet Rows      First Filed Value   Admission Height 162.6 cm (64\") Documented at 2021   Admission Weight 127 kg (280 lb) Documented at 2021 015          Objective       Physical Exam:     General Appearance:   Drowsy but answers questions.  Not in apparent distress.   Head:    Normocephalic, without obvious abnormality, atraumatic   Eyes:            Lids and lashes normal, conjunctivae and sclerae normal, no   icterus, no pallor, corneas clear, PERRLA   Ears:    Ears appear intact with no abnormalities noted   Throat:   No oral lesions, no thrush, oral mucosa moist   Neck:   No adenopathy, supple, " trachea midline, no thyromegaly, no   carotid bruit, no JVD   Back:     No significant abnormality   Lungs:    Bilateral equal air entry.  Bilateral extensive wheezes were heard.    Heart:   Irregular rhythm.  Normal heart rate.  No murmurs.   Chest Wall:    No abnormalities observed   Abdomen:     Normal bowel sounds, no masses, no organomegaly, soft    non-tender, non-distended, no guarding, no rebound                tenderness         Extremities:  2+ bilateral pedal edema.  Cold extremities          Telemetry:  A. fib.  Ventricular rate 100-110 bpm      Labs  Results from last 7 days   Lab Units 12/12/21  0057 12/10/21  2251 12/10/21  0044 12/09/21  0233   WBC 10*3/mm3 27.26* 31.70* 19.78* 29.87*   HEMOGLOBIN g/dL 10.5* 9.8* 10.4* 10.5*   HEMATOCRIT % 34.7 33.7* 34.7 33.6*   PLATELETS 10*3/mm3 198 256 267 318     Results from last 7 days   Lab Units 12/12/21 0057 12/11/21 1819 12/11/21 1008 12/11/21 0124 12/10/21  0044 12/09/21  0233   SODIUM mmol/L 131* 128* 130* 127* 131* 130*   POTASSIUM mmol/L 5.7* 5.1 6.1* 5.1 4.4 4.3   CHLORIDE mmol/L 96* 96* 96* 94* 98 93*   CO2 mmol/L 19.3* 17.0* 16.1* 16.3* 16.5* 24.0   BUN mg/dL 60* 51* 44* 35* 23* 22*   CREATININE mg/dL 2.54* 2.47* 2.22* 2.12* 1.05* 1.05*   CALCIUM mg/dL 8.0* 7.8* 8.4* 8.3* 8.4* 8.7   GLUCOSE mg/dL 144* 164* 97 139* 198* 136*     Results from last 7 days   Lab Units 12/12/21 0057 12/11/21 1819 12/11/21  1008 12/11/21  0124 12/10/21  0044 12/09/21  0233   BILIRUBIN mg/dL 4.8* 5.0* 4.6* 4.1* 3.1* 2.8*   ALK PHOS U/L 172* 163* 150* 150* 180* 113   AST (SGOT) U/L 2,999* 3,842* 3,887* 4,195* 242* 134*   ALT (SGPT) U/L 1,835* 1,875* 1,823* 1,563* 222* 191*     Results from last 7 days   Lab Units 12/10/21  2251 12/10/21  0044   MAGNESIUM mg/dL 2.5 2.1     Results from last 7 days   Lab Units 12/12/21  0057 12/11/21  0444 12/09/21  1110 12/09/21  0426   INR  3.62* 3.21* 1.66* 1.42*     Results from last 7 days   Lab Units 12/12/21  0057 12/10/21  2251  12/10/21  0044   CRP mg/dL 22.87* 27.13* 30.87*     Results from last 7 days   Lab Units 12/12/21  0057 12/11/21  0445 12/11/21  0124 12/10/21  2251 12/10/21  0303   CK TOTAL U/L 1,495* 2,338*  --  2,714*  --    TROPONIN T ng/mL  --  0.019 0.015  --  <0.010     Results from last 7 days   Lab Units 12/11/21  1944   PH, ARTERIAL pH units 7.323*   PO2 ART mm Hg 74.6*   PCO2, ARTERIAL mm Hg 43.8   HCO3 ART mmol/L 22.7           Assessment      1.  Acute systolic heart failure with EF <20% and signs of poor perfusion.  Stage D.        Cardiogenic shock.  2.  Persistent A. fib with RVR.  3.  Acute PE  4.  Shock liver  5.  Acute renal failure  6.  Acute hypoxic respiratory failure due to pneumonia, PE and HF.    Plan     1.  Restarted dobutamine for inotropic support to maintain systemic perfusion along with renal dose of dopamine.  2.  As patient has acute profound hemodynamic compromise, need to evaluate for mechanical circulatory support-not durable as a ' bridge to recovery' or' aq  ' bridge to decision' and hence decided to transfer her to heart failure/heart transplant unit at [facility].  I spoke with Dr. Yazan Terry from heart failure unit and he has accepted the patient for further management.  3.  Unable to initiate GDMT for HFrEF due to hypotension and renal failure.    Critical care provided between 8:10 AM and 8:50 AM on 12/12/2021      Leia Roth MD [facility]  12/12/21  10:05 EST

## 2021-12-12 NOTE — PROCEDURES
CHF, need for IF access    Physician Dr Castro    Procedure  The right neck and clavicle area was prepped and draped. Local was injected in the right lower neck and the IJ vein was accessed. The patient did move a lot and is obese, but eventually the wire was threaded and a small incision made. The dilator was passed and catheter inserted to 18 cm. All port had blood return and flushed easily. The catheter was sutured in and a dressing applied. A CXR was ordered.

## 2021-12-12 NOTE — PROGRESS NOTES
THC Physician - Brief Progress Note  PERMANENT  12/12/2021 16:27    [facility] - CCU - 10 - [place], [place] ([facility])    FRANCISCO DUNAWAY    Date of Service 12/12/2021 16:27    HPI/Events of Note [facility] Provider Assessment Note      46-year-old female with acute systolic heart failure persistent atrial fibrillation.  Severe LV systolic dysfunction with ejection fraction less than 20%.  Stage D class IV heart failure.  Status post PEA and shock liver as well as oliguric renal   failure.  History of narcotic dependency, single subsegmental pulmonary embolism.  Vital signs blood pressure 102/83 heart rate 121 respirations 12 saturation 96%  Cardiogenic shock secondary to nonischemic cardiomyopathy status post pulmonary embolism.  Will be transferred to higher level of care for device therapy.  Probably LVAD as destination therapy.  Patient currently on dobutamine and dopamine.  Other option is norepinephrine and milrinone particularly if problems with tachycardia.  However relieved therapy to cardiology.  Reviewed echocardiogram patient has biventricular failure with right ventricular dilatation RV dysfunction atrial septal aneurysm with left-to-right shift consistent with elevated left atrial pressure.  IVC dilated with less than 50% respirophasic   changes.    I spent _10_ minutes of critical care time with this patient who is at high risk for life threatening clinical deterioration.    __X___   Video Assessment performed  __X___   Most recent labs reviewed  __X___   Vital Signs reviewed  __X___   Best Practices addressed:                 VTE prophylaxis:Y                 SUP (when indicated):                 Glycemic control:                      Please notify bedside physician when present or [facility] if glc > 180 X 2                 Sepsis guidelines:                 Lung protective strategy                 Targeted Temperature Management:    _____     Spoke with bedside  RN  _____     Orders written      Contact [facility] for any needs if bedside physician is not present.      Interventions Major-Respiratory failure - evaluation and management, Other: CS        Electronically Signed by: Ghassan Becerra) on 12/12/2021 16:28

## 2021-12-12 NOTE — PLAN OF CARE
Goal Outcome Evaluation:  Plan of Care Reviewed With: patient        Progress: no change  Outcome Summary: VSS, Dobutamine stoped due to increased HR, UOP minimal and hospitalist informed, c/o of nausea with heart burn PRN's administered, pt restless thru night, will continue to monitor..

## 2021-12-12 NOTE — PROGRESS NOTES
Nephrology Progress Note    Interval History:     Patient Complaints: Patient is confused.  She is not able to participate in review of systems.  I saw her this morning and then have repeatedly interacted with the nurses and with Dr. Toscano and Dr. Roth about her condition.  Overnight she ended up on 10 mcg/kg/min of dobutamine and 5 mcg/kg/min of dobutamine.  Although she has been making urine, her urine output is gradually falling.  Her bilirubin is rising.  She is tachycardic.  She is hyperkalemic.        Vital Signs  Temp:  [97.3 °F (36.3 °C)-97.9 °F (36.6 °C)] 97.4 °F (36.3 °C)  Heart Rate:  [] 122  Resp:  [8-28] 12  BP: ()/() 125/87    Physical Exam:    General:           Confused and tachypneic      HEENT:  Icteric               Neck:  JVD       Lungs:    Crackles and wheeze   Heart:   Irregularly irregular,  no rub       Abdomen:   Normal bowel sounds, soft non-tender, non-distended, no guarding       Extremities:  Edematous and cool to touch       Skin: No petechiae       Neurologic: moves all extremities.        Results Review:    I reviewed the patient's new clinical results.    Lab Results (last 24 hours)     Procedure Component Value Units Date/Time    Basic Metabolic Panel [[id number]]  (Abnormal) Collected: 12/12/21 1408    Specimen: Blood Updated: 12/12/21 1446     Glucose 166 mg/dL      BUN 68 mg/dL      Creatinine 3.01 mg/dL      Sodium 131 mmol/L      Potassium 6.0 mmol/L      Comment: Slight hemolysis detected by analyzer. Results may be affected.        Chloride 96 mmol/L      CO2 18.5 mmol/L      Calcium 7.6 mg/dL      eGFR Non African Amer 17 mL/min/1.73      BUN/Creatinine Ratio 22.6     Anion Gap 16.5 mmol/L     Narrative:      GFR Normal >60  Chronic Kidney Disease <60  Kidney Failure <15      Blood Gas, Venous With Co-Ox [[id number]]  (Abnormal) Collected: 12/12/21 1409    Specimen: Venous Blood Updated:  12/12/21 1413     Site Lab     pH, Venous 7.358 pH Units      pCO2, Venous 46.8 mm Hg      pO2, Venous 46.0 mm Hg      HCO3, Venous 26.3 mmol/L      Base Excess, Venous 0.5 mmol/L      O2 Saturation, Venous 74.1 %      Hemoglobin, Blood Gas 10.6 g/dL      Comment: 84 Value below reference range        CO2 Content 27.7 mmol/L      Barometric Pressure for Blood Gas 735 mmHg      Modality Nasal Cannula     FIO2 28 %      Flow Rate 2.0 lpm      Collected by lab     Comment: Meter: [id number]     :  [id number]        Oxyhemoglobin Venous 72.6 %      Carboxyhemoglobin Venous 2.1 %      Methemoglobin Venous 0.0 %      Comment: 84 Value below reference range       POC Glucose Once [[id number]]  (Abnormal) Collected: 12/12/21 1130    Specimen: Blood Updated: 12/12/21 1139     Glucose 173 mg/dL      Comment: Meter: [id number] : [id number] FISH VITALE       Blood Gas, Venous With Co-Ox [[id number]]  (Abnormal) Collected: 12/12/21 1054    Specimen: Venous Blood Updated: 12/12/21 1057     Site Lab     pH, Venous 7.319 pH Units      pCO2, Venous 39.4 mm Hg      Comment: 84 Value below reference range        pO2, Venous 67.3 mm Hg      Comment: 83 Value above reference range        HCO3, Venous 20.2 mmol/L      Comment: 84 Value below reference range        Base Excess, Venous -5.5 mmol/L      O2 Saturation, Venous 89.9 %      Comment: 83 Value above reference range        Hemoglobin, Blood Gas 10.4 g/dL      Comment: 84 Value below reference range        CO2 Content 21.4 mmol/L      Temperature 37.0 C      Barometric Pressure for Blood Gas 736 mmHg      Modality Nasal Cannula     FIO2 28 %      Ventilator Mode NA     Collected by [id number]     Comment: Meter: [id number]     :  [id number]        Oxyhemoglobin Venous 88.2 %      Comment: 83 Value above reference range        Carboxyhemoglobin Venous 2.0 %      Methemoglobin Venous 0.0 %      Comment: 84 Value below reference range       Comprehensive  Metabolic Panel [[id number]]  (Abnormal) Collected: 12/12/21 0910    Specimen: Blood Updated: 12/12/21 1011     Glucose 113 mg/dL      BUN 63 mg/dL      Creatinine 2.69 mg/dL      Sodium 128 mmol/L      Potassium 6.6 mmol/L      Comment: Slight hemolysis detected by analyzer. Results may be affected.        Chloride 95 mmol/L      CO2 14.6 mmol/L      Calcium 8.0 mg/dL      Total Protein 7.2 g/dL      Albumin 2.77 g/dL      ALT (SGPT) 1,804 U/L      AST (SGOT) 2,462 U/L      Alkaline Phosphatase 193 U/L      Total Bilirubin 5.1 mg/dL      eGFR Non African Amer 19 mL/min/1.73      Globulin 4.4 gm/dL      A/G Ratio 0.6 g/dL      BUN/Creatinine Ratio 23.4     Anion Gap 18.4 mmol/L     Narrative:      GFR Normal >60  Chronic Kidney Disease <60  Kidney Failure <15      aPTT [[id number]]  (Abnormal) Collected: 12/12/21 0910    Specimen: Blood Updated: 12/12/21 0942     PTT 57.1 seconds     Narrative:      PTT Heparin Therapeutic Range:  59 - 95 seconds      POC Glucose Once [[id number]]  (Abnormal) Collected: 12/12/21 0604    Specimen: Blood Updated: 12/12/21 0620     Glucose 202 mg/dL      Comment: RN Notified Meter: [id number] : [id number] [facility]       Blood Culture - Blood, Arm, Left [[id number]]  (Normal) Collected: 12/09/21 0256    Specimen: Blood from Arm, Left Updated: 12/12/21 0315     Blood Culture No growth at 3 days    Blood Culture - Blood, Arm, Left [[id number]]  (Normal) Collected: 12/09/21 0233    Specimen: Blood from Arm, Left Updated: 12/12/21 0245     Blood Culture No growth at 3 days    Manual Differential [[id number]]  (Abnormal) Collected: 12/12/21 0057    Specimen: Blood Updated: 12/12/21 0220     Neutrophil % 91.0 %      Lymphocyte % 2.0 %      Monocyte % 6.0 %      Metamyelocyte % 1.0 %      Neutrophils Absolute 24.81 10*3/mm3      Lymphocytes Absolute 0.55 10*3/mm3      Monocytes Absolute 1.64 10*3/mm3      nRBC 6.0 /100 WBC      Anisocytosis Mod/2+     Hypochromia Mod/2+     Toxic  Granulation Slight/1+     Vacuolated Neutrophils Slight/1+     Platelet Morphology Normal    CBC & Differential [[id number]]  (Abnormal) Collected: 12/12/21 0057    Specimen: Blood Updated: 12/12/21 0220    Narrative:      The following orders were created for panel order CBC & Differential.  Procedure                               Abnormality         Status                     ---------                               -----------         ------                     CBC Auto Differential[[id number]]        Abnormal            Final result               Scan Slide[[id number]]                                       Final result               Slide Review, Hematology[[id number]]                         In process                   Please view results for these tests on the individual orders.    Scan Slide [[id number]] Collected: 12/12/21 0057    Specimen: Blood Updated: 12/12/21 0220     Scan Slide --     Comment: See Manual Differential Results       CBC Auto Differential [[id number]]  (Abnormal) Collected: 12/12/21 0057    Specimen: Blood Updated: 12/12/21 0220     WBC 27.26 10*3/mm3      RBC 4.25 10*6/mm3      Hemoglobin 10.5 g/dL      Hematocrit 34.7 %      MCV 81.6 fL      MCH 24.7 pg      MCHC 30.3 g/dL      RDW 18.6 %      RDW-SD 53.7 fl      MPV 11.4 fL      Platelets 198 10*3/mm3     Slide Review, Hematology [[id number]] Collected: 12/12/21 0057    Specimen: Blood Updated: 12/12/21 0219    Comprehensive Metabolic Panel [[id number]]  (Abnormal) Collected: 12/12/21 0057    Specimen: Blood Updated: 12/12/21 0203     Glucose 144 mg/dL      BUN 60 mg/dL      Creatinine 2.54 mg/dL      Sodium 131 mmol/L      Potassium 5.7 mmol/L      Chloride 96 mmol/L      CO2 19.3 mmol/L      Calcium 8.0 mg/dL      Total Protein 7.1 g/dL      Albumin 2.91 g/dL      ALT (SGPT) 1,835 U/L      AST (SGOT) 2,999 U/L      Alkaline Phosphatase 172 U/L      Total Bilirubin 4.8 mg/dL      eGFR Non African Amer 20 mL/min/1.73      Globulin 4.2 gm/dL       A/G Ratio 0.7 g/dL      BUN/Creatinine Ratio 23.6     Anion Gap 15.7 mmol/L     Narrative:      GFR Normal >60  Chronic Kidney Disease <60  Kidney Failure <15      Lactic Acid, Plasma [[id number]]  (Abnormal) Collected: 12/12/21 0057    Specimen: Blood Updated: 12/12/21 0145     Lactate 3.9 mmol/L     CK [[id number]]  (Abnormal) Collected: 12/12/21 0057    Specimen: Blood Updated: 12/12/21 0139     Creatine Kinase 1,495 U/L     C-reactive Protein [[id number]]  (Abnormal) Collected: 12/12/21 0057    Specimen: Blood Updated: 12/12/21 0139     C-Reactive Protein 22.87 mg/dL     Protime-INR [[id number]]  (Abnormal) Collected: 12/12/21 0057    Specimen: Blood Updated: 12/12/21 0132     Protime 36.3 Seconds      INR 3.62    Narrative:      Suggested INR therapeutic range for stable oral anticoagulant therapy:    Low Intensity therapy:   1.5-2.0  Moderate Intensity therapy:   2.0-3.0  High Intensity therapy:   2.5-4.0    aPTT [[id number]]  (Abnormal) Collected: 12/12/21 0057    Specimen: Blood Updated: 12/12/21 0132     PTT >100.0 seconds     Narrative:      PTT Heparin Therapeutic Range:  59 - 95 seconds      Protein / Creatinine Ratio, Urine - Urine, Clean Catch [[id number]]  (Abnormal) Collected: 12/11/21 1328    Specimen: Urine, Clean Catch Updated: 12/11/21 2120     Protein/Creatinine Ratio, Urine 1,318.0 mg/G Crea      Creatinine, Urine 108.5 mg/dL      Total Protein, Urine 143.0 mg/dL     Microalbumin / Creatinine Urine Ratio - Urine, Clean Catch [[id number]] Collected: 12/11/21 1328    Specimen: Urine, Clean Catch Updated: 12/11/21 2120     Microalbumin/Creatinine Ratio 280.2 mg/g      Creatinine, Urine 108.5 mg/dL      Microalbumin, Urine 30.4 mg/dL     POC Glucose Once [[id number]]  (Abnormal) Collected: 12/11/21 2047    Specimen: Blood Updated: 12/11/21 2109     Glucose 193 mg/dL      Comment: RN Notified Meter: [id number] : [id number] JÚNIOR SERG       Blood Gas, Arterial With Co-Ox [[id number]]  (Abnormal)  Collected: 12/11/21 1944    Specimen: Arterial Blood Updated: 12/11/21 1948     Site Right Radial     Casimiro's Test N/A     pH, Arterial 7.323 pH units      Comment: 84 Value below reference range        pCO2, Arterial 43.8 mm Hg      pO2, Arterial 74.6 mm Hg      Comment: 84 Value below reference range        HCO3, Arterial 22.7 mmol/L      Base Excess, Arterial -3.3 mmol/L      O2 Saturation, Arterial 92.9 %      Comment: 84 Value below reference range        Hemoglobin, Blood Gas 10.8 g/dL      Comment: 84 Value below reference range        Hematocrit, Blood Gas 33.1 %      Comment: 84 Value below reference range        Oxyhemoglobin 91.9 %      Comment: 84 Value below reference range        Methemoglobin <-0.10 %      Comment: 94 Value below reportable range < _0.1        Carboxyhemoglobin 1.8 %      A-a Gradiant 67.7 mmHg      CO2 Content 24.0 mmol/L      Temperature 0.0 C      Barometric Pressure for Blood Gas 730 mmHg      Modality Nasal Cannula     FIO2 28 %      Flow Rate 2.0 lpm      Ventilator Mode NA     Note --     Notified Who PCU RN     Collected by [id number]     Comment: Meter: [id number]     :  [id number]        pH, Temp Corrected --     pCO2, Temperature Corrected --     pO2, Temperature Corrected --    Ammonia [[id number]]  (Abnormal) Collected: 12/11/21 1903    Specimen: Blood Updated: 12/11/21 1941     Ammonia 103 umol/L     Comprehensive Metabolic Panel [[id number]]  (Abnormal) Collected: 12/11/21 1819    Specimen: Blood Updated: 12/11/21 1915     Glucose 164 mg/dL      BUN 51 mg/dL      Creatinine 2.47 mg/dL      Sodium 128 mmol/L      Potassium 5.1 mmol/L      Comment: Slight hemolysis detected by analyzer. Results may be affected.        Chloride 96 mmol/L      CO2 17.0 mmol/L      Calcium 7.8 mg/dL      Total Protein 7.2 g/dL      Albumin 2.76 g/dL      ALT (SGPT) 1,875 U/L      AST (SGOT) 3,842 U/L      Alkaline Phosphatase 163 U/L      Total Bilirubin 5.0 mg/dL      eGFR Non   Amer 21 mL/min/1.73      Globulin 4.4 gm/dL      A/G Ratio 0.6 g/dL      BUN/Creatinine Ratio 20.6     Anion Gap 15.0 mmol/L     Narrative:      GFR Normal >60  Chronic Kidney Disease <60  Kidney Failure <15      EBV PCR Qn, WB [[id number]] Collected: 12/11/21 1819    Specimen: Blood Updated: 12/11/21 1912    aPTT [[id number]]  (Abnormal) Collected: 12/11/21 1819    Specimen: Blood Updated: 12/11/21 1842     PTT 64.5 seconds     Narrative:      PTT Heparin Therapeutic Range:  59 - 95 seconds            Imaging Results (Last 24 Hours)     Procedure Component Value Units Date/Time    XR Chest 1 View [[id number]] Collected: 12/12/21 1436     Updated: 12/12/21 1439    Narrative:      EXAM:    XR Chest, 1 View     EXAM DATE:    12/12/2021 1:58 PM     CLINICAL HISTORY:    central line placement verification; I26.93-Single subsegmental  pulmonary embolism without acute cor pulmonale; J18.9-Pneumonia,  unspecified organism; A41.9-Sepsis, unspecified organism     TECHNIQUE:    Frontal view of the chest.     COMPARISON:    12/09/2021     FINDINGS:    Lungs:  Worsening by lateral airspace disease which may represent  edema.    Pleural space:  No pneumothorax.  Small effusions.    Heart:  Cardiomegaly.    Mediastinum:  Unremarkable.    Bones/joints:  Unremarkable.    Tubes, lines and devices:  Right IJ deep line placement with tip at  the cavoatrial junction.       Impression:      1.  Right IJ deep line placement. No pneumothorax.  2.  Worsening changes of probable CHF/edema.     This report was finalized on 12/12/2021 2:37 PM by Dr. Julius Arvizu MD.             Assessment and Plan:    1.  Acute renal failure with acute tubular necrosis with oliguria  2.  Hypoxic respiratory failure  3.  Metabolic acidosis with elevated lactate  4.  Shock liver  5.  Pulmonary embolism  6.  Pneumonia with sepsis  7.  Left ventricular ejection fraction 20%  8.  Atrial fibrillation now with controlled ventricular rate  9.  Hyperkalemia  10.   Metabolic encephalopathy  11.  Hyponatremia    For her initial cell potassium of 6.6, I had ordered Kayexalate and lactulose and glucose and insulin.  I ordered a venous blood gas and give her bicarbonate.  And when I repeated the labs we ordered some additional bicarbonate.  A low sodium was of concern so I asked pharmacology to double concentrate her drips that she is getting for pressor support in saline but this is not technically possible at this facility.  The patient was at one point getting 70 mL of free water with dopamine and dobutamine and getting another 30 mL/h of half-normal saline.  I ordered repeat potassium again and it was down to 6.  Repeat venous gas showed improvement in the acidemia.  I have reordered glucose with insulin.  I am going to give her Dulcolax suppository as she has not had a bowel movement with all that lactulose that she has received.  I will reorder a basic panel at 9 PM to make sure the potassium is manageable.  Luckily her sodium is a little better with the latest labs.  Did discuss with the ICU nurse to make sure that the Levophed is mixed in saline or D5 normal saline and not dextrose.  Prognosis guarded.  Critical care rendered for 40 minutes.    Leon Faye MD  12/12/21  17:12 EST

## 2021-12-13 NOTE — CASE MANAGEMENT/SOCIAL WORK
Continued Stay Note   Peter     Patient Name: Tiera Mendoza  MRN: [id number]  Today's Date: 12/13/2021    Admit Date: 12/9/2021     Discharge Plan     Row Name 12/13/21 1705       Plan    Plan Pt was admitted on 12/09/21. SS attempted to speak with Pt but due to confusion, contacted Pt's father, Guille Neal [phone] on this date. Pt lives with her significant other, Roni and pending clinical improvement plans to return home. Pt does not utilize home health or [facility] services. Pt's PCP is Rhett Martin. Pt does not have a POA/living will. SS to discuss possible substance abuse treatment resources when Pt is more alert. SS to follow.    Row Name 12/13/21 1640       Plan    Plan No NOK listed on face sheet.  S/O phone was not available.  Phoned Alesia Raza RN primary who states that no one has called to check on patient.  Patient does have phone in room however, it was dead.  Alesia plugged up said phone to see if there where any numbers listed that we could contact.  Phone PCP office who supplied NOK Father Guille Neal with phone number [phone].  CM phoned Guille who states that he was patient's father and that he knew she was in the hospital but did not know that she was to be transferred to [facility].  Alesia phoned CM back and states that she asks patient if she could give infomation to her father Guille Neal and patient did agree by shaking head up and down.  Pieter transferred call to SS who needed to get basic information and she transferred to CCU for father to get information.    Plan Comments 12/13:  -126 //103, CLD, 2L N/C, IV Doxy, Merrem, Solu-Medrol, Dobutamine, Heparin gtt, , Na+ 130, K+ 5.3, B/Cr 90/3.27, GFR 15, ALT 1,456, AST 1,567, Bili 5.5, CRP 16.36, WBC 26.58, Echo EF 20%, Pt unable to tolerate lactulose enema, small mucoid BMs only, Pt cries out at times, Pulmonary embolism, A Flutter, Pt is confused, sleepy & does not follow commands or answer questions, [facility] but no  beds available[facility]               Discharge Codes    No documentation.               Expected Discharge Date and Time     Expected Discharge Date Expected Discharge Time    Dec 14, 2021             Serene Contreras RN

## 2021-12-13 NOTE — NURSING NOTE
Patient agreed for us to speak to her father about her condition.   Father was contacted by case management for next of kin and his information was added to patient's contact list at hospital.

## 2021-12-13 NOTE — NURSING NOTE
"Pt daughter here for visit at this time. Daughter states Tiera is still  legally to Roni (number in chart). She lives with  at this time but is currently \"going through a divorce\". Pt daughter states she is the oldest child at 23 years old. There are two other siblings ages 15 and 4. Added daughter to contact list.   " Statement Selected

## 2021-12-13 NOTE — NURSING NOTE
Spoke with Mercedes at [facility] and gave update on patient; currently no bed available and they will call daily for updates until bed becomes available.

## 2021-12-13 NOTE — CASE MANAGEMENT/SOCIAL WORK
Discharge Planning Assessment   Peter     Patient Name: Tiera Mendoza  MRN: [id number]  Today's Date: 12/13/2021    Admit Date: 12/9/2021     Discharge Needs Assessment     Row Name 12/13/21 1703       Living Environment    Lives With significant other    Name(s) of Who Lives With Patient Roni Escoto (significant other)    Current Living Arrangements home/apartment/condo    Primary Care Provided by self    Provides Primary Care For no one    Family Caregiver if Needed other (see comments)  Father, Guille Neal [phone]    Quality of Family Relationships involved       Transition Planning    Transportation Anticipated family or friend will provide       Discharge Needs Assessment    Equipment Currently Used at Home none               Discharge Plan     Row Name 12/13/21 1707       Plan    Plan Pt was admitted on 12/09/21. SS attempted to speak with Pt but due to confusion, contacted Pt's father, Guille Neal [phone] on this date. Pt lives with her significant other, Roni and pending clinical improvement plans to return home. Pt does not utilize home health or [facility] services. Pt's PCP is Rhett Martin. Pt does not have a POA/living will. SS to discuss possible substance abuse treatment resources when Pt is more alert. SS to follow.    Row Name 12/13/21 1640                       Radha Clement

## 2021-12-13 NOTE — PROGRESS NOTES
Dr. NEDA Murphy    [facility] CRITICAL CARE    12/13/2021    Patient ID:  Name:  Tiera Mendoza  MRN:  [id number]  1975  46 y.o.  female            CC/Reason for visit: Pulmonary embolism, severe sepsis, a flutter    Interval hx: Patient is a little encephalopathic. She is confused. She is sleepy. I try to wake her up but she is not answering my questions or following my commands but she does moan and groan and opens her eyes briefly    ROS: Unobtainable    I reviewed old medical records.  Past medical history, social history and family history: Unchanged from admission H&P.      Vitals:  Vitals:    12/13/21 1330 12/13/21 1340 12/13/21 1355 12/13/21 1410   BP:  115/98 127/93 125/87   BP Location:       Patient Position:       Pulse:  (!) 126 (!) 126 (!) 126   Resp:       Temp:       TempSrc:       SpO2: 94% 93% 93% 94%   Weight:       Height:               Body mass index is 45.89 kg/m².    Intake/Output Summary (Last 24 hours) at 12/13/2021 1440  Last data filed at 12/13/2021 0942  Gross per 24 hour   Intake 1441.2 ml   Output 450 ml   Net 991.2 ml       Exam:  GEN:  Obese  Moans and groans to sternal rub, opens eyes briefly but does not follow commands, does not answer my questions  LUNGS: Distant and diminished but clear breath sounds bilat, no use of accessory muscles  CV:  Irregularly irregular, without murmur, 1+ ankle edema  ABD:  Non tender, morbid obesity hampers rest of the examination      Scheduled meds:  budesonide-formoterol, 2 puff, Inhalation, BID - RT  doxycycline, 100 mg, Intravenous, Q12H  ipratropium, 0.5 mg, Nebulization, 4x Daily - RT  lactulose, 30 g, Oral, BID  lactulose, 90 g, Oral, Daily  meropenem, 1 g, Intravenous, Q12H  methylPREDNISolone sodium succinate, 20 mg, Intravenous, Q12H  metoprolol tartrate, 25 mg, Oral, Q12H  sodium chloride, 10 mL, Intravenous, Q12H  sodium chloride, 10 mL, Intravenous, Q12H  sodium zirconium cyclosilicate, 10 g, Oral, BID  thiamine, 100 mg,  Intramuscular, Daily      IV meds:                      DOBUTamine, 2-20 mcg/kg/min, Last Rate: 2.5 mcg/kg/min (12/12/21 2344)  heparin (porcine), 7.87 Units/kg/hr, Last Rate: Stopped (12/13/21 1400)  norepinephrine, 0.02-0.3 mcg/kg/min, Last Rate: Stopped (12/12/21 1643)  Pharmacy Consult,         Data Review:   I reviewed the patient's medications and new clinical results.    Results from last 7 days   Lab Units 12/13/21  1216 12/13/21  0356 12/12/21  2139 12/12/21  1736 12/12/21  1736 12/12/21  1408 12/12/21  0910 12/12/21  0057 12/12/21  0057 12/11/21  1008 12/11/21  0444 12/11/21  0124 12/10/21  2251 12/10/21  0044 12/09/21  1110 12/09/21  0426 12/09/21  0233   SODIUM mmol/L 130* 131* 132*   < > 127*   < > 128*   < > 131*   < >  --    < >  --    < >  --   --  130*   POTASSIUM mmol/L 5.3* 5.6* 5.4*   < > 5.3*   < > 6.6*   < > 5.7*   < >  --    < >  --    < >  --   --  4.3   CHLORIDE mmol/L 97* 97* 96*   < > 95*   < > 95*   < > 96*   < >  --    < >  --    < >  --   --  93*   CO2 mmol/L 21.8* 22.0 23.7   < > 19.3*   < > 14.6*   < > 19.3*   < >  --    < >  --    < >  --   --  24.0   BUN mg/dL 90* 82* 76*   < > 71*   < > 63*   < > 60*   < >  --    < >  --    < >  --   --  22*   CREATININE mg/dL 3.27* 3.18* 3.03*   < > 2.99*   < > 2.69*   < > 2.54*   < >  --    < >  --    < >  --   --  1.05*   CALCIUM mg/dL 7.3* 7.4* 7.3*   < > 7.5*   < > 8.0*   < > 8.0*   < >  --    < >  --    < >  --   --  8.7   BILIRUBIN mg/dL  --  5.5*  --   --  5.5*  --  5.1*   < > 4.8*   < >  --    < >  --    < >  --   --  2.8*   ALK PHOS U/L  --  192*  --   --  186*  --  193*   < > 172*   < >  --    < >  --    < >  --   --  113   ALT (SGPT) U/L  --  1,456*  --   --  1,577*  --  1,804*   < > 1,835*   < >  --    < >  --    < >  --   --  191*   AST (SGOT) U/L  --  1,567*  --   --  2,069*  --  2,462*   < > 2,999*   < >  --    < >  --    < >  --   --  134*   GLUCOSE mg/dL 171* 155* 148*   < > 165*   < > 113*   < > 144*   < >  --    < >  --    < >   --   --  136*   WBC 10*3/mm3  --  26.58*  --   --   --   --   --   --  27.26*  --   --   --  31.70*   < >  --   --  29.87*   HEMOGLOBIN g/dL  --  10.0*  --   --   --   --   --   --  10.5*  --   --   --  9.8*   < >  --   --  10.5*   PLATELETS 10*3/mm3  --  134*  --   --   --   --   --   --  198  --   --   --  256   < >  --   --  318   INR   --   --   --   --   --   --   --   --  3.62*  --  3.21*  --   --   --  1.66*   < >  --    PROBNP pg/mL  --   --   --   --   --   --   --   --   --   --   --   --   --   --   --   --  4,147.0*    < > = values in this interval not displayed.     Results from last 7 days   Lab Units 12/09/21  0256 12/09/21  0233   BLOODCX  No growth at 4 days No growth at 4 days           Results from last 7 days   Lab Units 12/13/21  0431 12/12/21  1409 12/12/21  1054 12/11/21  1944 12/11/21  1138 12/09/21 2129   PH, ARTERIAL pH units 7.395  --   --  7.323* 7.331* 7.423   PCO2, ARTERIAL mm Hg 42.0  --   --  43.8 37.3 33.3*   PO2 ART mm Hg 94.3  --   --  74.6* 79.0* 88.4   O2 SATURATION ART % 97.8  --   --  92.9* 94.5 97.8   FLOW RATE lpm 3.0 2.0  --  2.0 2.0  --    MODALITY  Nasal Cannula Nasal Cannula Nasal Cannula Nasal Cannula Nasal Cannula Nasal Cannula             ASSESSMENT:   Severe sepsis  Pulmonary embolism  Pulmonary infiltrates  Hyponatremia  Hyperkalemia  Acute kidney injury  Atrial flutter  Elevated liver enzymes  Morbid obesity  History of amphetamine use  History of asthma  Right pleural effusion  Dysphagia      PLAN:  Patient and all problems new to me.  Apparently echo shows right ventricular strain.  Continue heparin drip for pulmonary embolism.  Cardiology has the patient on dobutamine for inotropic support.  Unfortunately patient has significant kidney injury with azotemia. Continue to monitor urine output.  Nephrology is following along.  Awaiting transfer to tertiary center such as [facility] for further heart failure management and possible evaluation for  transplant.  Patient currently on meropenem. Check procalcitonin and monitor white count and temperature curve. So far microbiology studies are negative.  Wean oxygen as tolerated. Likely has obesity hypoventilation syndrome, goal oxygen saturation should be 88% to 94%. Avoid hyperoxia.    Total critical care time 34 minutes excluding any separately billable procedure time        Junaid Murphy MD  12/13/2021

## 2021-12-13 NOTE — PROGRESS NOTES
"  Patient Identification:  Name:  Tiera Mendoza  Age:  46 y.o.  Sex:  female  :  1975  MRN:  [id number]  Visit Number:  [id number]  Primary care provider:  Alan Martin APRN    Reason for follow-up:  Acute systolic heart failure.  Stage D.  And persistent A. fib.    Subjective     Patient is lethargic and was unable to give any history.  Discussed with RN.  Patient is now off Levophed.  Only on dobutamine 2.5 mcg/kg/min with good BP.  Her BP is around 120--130 mmHg systolic.  Patient had some urine output.  Extremities are not cold.      Medication Review:   budesonide-formoterol, 2 puff, Inhalation, BID - RT  doxycycline, 100 mg, Intravenous, Q12H  ipratropium, 0.5 mg, Nebulization, 4x Daily - RT  lactulose, 30 g, Oral, BID  lactulose, 90 g, Oral, Daily  meropenem, 1 g, Intravenous, Q12H  methylPREDNISolone sodium succinate, 20 mg, Intravenous, Q12H  metoprolol tartrate, 50 mg, Oral, Q12H  sodium chloride, 10 mL, Intravenous, Q12H  sodium chloride, 10 mL, Intravenous, Q12H  sodium zirconium cyclosilicate, 10 g, Oral, BID  thiamine, 100 mg, Intramuscular, Daily          Vital Sign Min/Max for last 24 hours  Temp  Min: 97.4 °F (36.3 °C)  Max: 97.9 °F (36.6 °C)   BP  Min: 76/48  Max: 137/104   Pulse  Min: 82  Max: 124   Resp  Min: 8  Max: 24   SpO2  Min: 88 %  Max: 100 %   No data recorded   Weight  Min: 137 kg (301 lb 13 oz)  Max: 137 kg (301 lb 13 oz)     Flowsheet Rows      First Filed Value   Admission Height 162.6 cm (64\") Documented at 2021   Admission Weight 127 kg (280 lb) Documented at 2021          Objective       Physical Exam:       General Appearance:   Lethargic.  Not in apparent distress.   Head:    Normocephalic, without obvious abnormality, atraumatic   Eyes:            Lids and lashes normal, conjunctivae and sclerae normal, no   icterus, no pallor, corneas clear, PERRLA   Ears:    Ears appear intact with no abnormalities noted   Throat:   No oral lesions, no thrush, " oral mucosa moist   Neck:   No adenopathy, supple, trachea midline, no thyromegaly, no   carotid bruit, no JVD   Back:     No significant abnormality   Lungs:    Bilateral equal air entry.  Bilateral extensive wheezes were heard.    Heart:   Irregular rhythm.  Normal heart rate.  No murmurs.   Chest Wall:    No abnormalities observed   Abdomen:     Normal bowel sounds, no masses, no organomegaly, soft    non-tender, non-distended, no guarding, no rebound                tenderness         Extremities:  2+ bilateral pedal edema.  Cold extremities          Telemetry:  A. fib/flutter.  Ventricular rate in 100s.      Labs  Results from last 7 days   Lab Units 12/13/21  0356 12/12/21  0057 12/10/21  2251 12/10/21  0044 12/09/21  0233   WBC 10*3/mm3 26.58* 27.26* 31.70* 19.78* 29.87*   HEMOGLOBIN g/dL 10.0* 10.5* 9.8* 10.4* 10.5*   HEMATOCRIT % 32.1* 34.7 33.7* 34.7 33.6*   PLATELETS 10*3/mm3 134* 198 256 267 318     Results from last 7 days   Lab Units 12/13/21  0356 12/12/21  2139 12/12/21  1736 12/12/21  1408 12/12/21  0910 12/12/21  0057 12/11/21  1819   SODIUM mmol/L 131* 132* 127* 131* 128* 131* 128*   POTASSIUM mmol/L 5.6* 5.4* 5.3* 6.0* 6.6* 5.7* 5.1   CHLORIDE mmol/L 97* 96* 95* 96* 95* 96* 96*   CO2 mmol/L 22.0 23.7 19.3* 18.5* 14.6* 19.3* 17.0*   BUN mg/dL 82* 76* 71* 68* 63* 60* 51*   CREATININE mg/dL 3.18* 3.03* 2.99* 3.01* 2.69* 2.54* 2.47*   CALCIUM mg/dL 7.4* 7.3* 7.5* 7.6* 8.0* 8.0* 7.8*   GLUCOSE mg/dL 155* 148* 165* 166* 113* 144* 164*     Results from last 7 days   Lab Units 12/13/21  0356 12/12/21  1736 12/12/21  0910 12/12/21  0057 12/11/21  1819 12/11/21  1008 12/11/21  0124   BILIRUBIN mg/dL 5.5* 5.5* 5.1* 4.8* 5.0* 4.6* 4.1*   ALK PHOS U/L 192* 186* 193* 172* 163* 150* 150*   AST (SGOT) U/L 1,567* 2,069* 2,462* 2,999* 3,842* 3,887* 4,195*   ALT (SGPT) U/L 1,456* 1,577* 1,804* 1,835* 1,875* 1,823* 1,563*     Results from last 7 days   Lab Units 12/10/21  2251 12/10/21  0044   MAGNESIUM mg/dL 2.5 2.1      Results from last 7 days   Lab Units 12/12/21  0057 12/11/21  0444 12/09/21  1110 12/09/21  0426   INR  3.62* 3.21* 1.66* 1.42*     Results from last 7 days   Lab Units 12/13/21  0356 12/12/21  0057 12/10/21  2251   CRP mg/dL 16.36* 22.87* 27.13*     Results from last 7 days   Lab Units 12/13/21  0356 12/12/21  0057 12/11/21  0445 12/11/21  0124 12/10/21  2251 12/10/21  0303   CK TOTAL U/L 859* 1,495* 2,338*  --    < >  --    TROPONIN T ng/mL  --   --  0.019 0.015  --  <0.010    < > = values in this interval not displayed.     Results from last 7 days   Lab Units 12/13/21  0431   PH, ARTERIAL pH units 7.395   PO2 ART mm Hg 94.3   PCO2, ARTERIAL mm Hg 42.0   HCO3 ART mmol/L 25.7           Assessment      1.  Acute systolic heart failure with EF <20% and signs of poor perfusion.  Stage D.        Cardiogenic shock.  2.  Persistent A. fib with RVR.  3.  Acute PE  4.  Shock liver  5.  Acute renal failure  6.  Acute hypoxic respiratory failure due to pneumonia, PE and HF.    Plan     1.  Continue dobutamine for inotropic support to maintain systemic perfusion.  2.  Unable to initiate GDMT for HFrEF due to hypotension and renal failure.  3.  Patient will be transferred to heart failure/heart transplant unit at [facility] once bed is available for further advanced care.  I discussed with  at heart failure unit.      Leia Roth MD [facility]  12/13/21  09:53 EST

## 2021-12-13 NOTE — PROGRESS NOTES
Nephrology Progress Note      Subjective     Patient was not able to provide subjective complaints. She is on dobutamine and levo has been stopped.     Objective       Vital signs :     Temp:  [97.3 °F (36.3 °C)-97.9 °F (36.6 °C)] 97.6 °F (36.4 °C)  Heart Rate:  [] 110  Resp:  [8-24] 20  BP: ()/() 116/94      Intake/Output Summary (Last 24 hours) at 12/13/2021 0738  Last data filed at 12/13/2021 0620  Gross per 24 hour   Intake 1511.2 ml   Output 450 ml   Net 1061.2 ml       Physical Exam:    General Appearance : not in acute distress  Lungs : B/L lower zone crackles  Heart :  regular rhythm & normal rate, normal S1, S2 and no murmur, no rub  Abdomen : normal bowel sounds, no masses, no hepatomegaly, no splenomegaly, soft non-tender and no guarding  Extremities : trace edema, no cyanosis and no redness  Neurologic :   Unable to assess.       Laboratory Data :     Albumin Albumin   Date Value Ref Range Status   12/13/2021 2.76 (L) 3.50 - 5.20 g/dL Final   12/12/2021 2.63 (L) 3.50 - 5.20 g/dL Final   12/12/2021 2.77 (L) 3.50 - 5.20 g/dL Final   12/12/2021 2.91 (L) 3.50 - 5.20 g/dL Final   12/11/2021 2.76 (L) 3.50 - 5.20 g/dL Final   12/11/2021 2.94 (L) 3.50 - 5.20 g/dL Final   12/11/2021 3.08 (L) 3.50 - 5.20 g/dL Final      Magnesium Magnesium   Date Value Ref Range Status   12/10/2021 2.5 1.6 - 2.6 mg/dL Final          PTH               No results found for: PTH    CBC and coagulation:  Results from last 7 days   Lab Units 12/13/21  0356 12/12/21  0057 12/11/21  0444 12/10/21  2251 12/10/21  0044 12/09/21  2026 12/09/21  1110 12/09/21 0426 12/09/21 0426   LACTATE mmol/L  --  3.9*  --   --   --  3.6*  --   --  3.6*   CRP mg/dL 16.36* 22.87*  --  27.13*   < >  --   --   --   --    WBC 10*3/mm3 26.58* 27.26*  --  31.70*   < >  --   --   --   --    HEMOGLOBIN g/dL 10.0* 10.5*  --  9.8*   < >  --   --   --   --    HEMATOCRIT % 32.1* 34.7  --  33.7*   < >  --   --   --   --    MCV fL 78.1* 81.6  --   84.0   < >  --   --   --   --    MCHC g/dL 31.2* 30.3*  --  29.1*   < >  --   --   --   --    PLATELETS 10*3/mm3 134* 198  --  256   < >  --   --   --   --    INR   --  3.62* 3.21*  --   --   --  1.66*   < > 1.42*    < > = values in this interval not displayed.     Acid/base balance:  Results from last 7 days   Lab Units 12/13/21  0431 12/11/21  1944 12/11/21  1138   PH, ARTERIAL pH units 7.395 7.323* 7.331*   PO2 ART mm Hg 94.3 74.6* 79.0*   PCO2, ARTERIAL mm Hg 42.0 43.8 37.3   HCO3 ART mmol/L 25.7 22.7 19.7*     Renal and electrolytes:  Results from last 7 days   Lab Units 12/13/21  0356 12/12/21  2139 12/12/21  1736 12/12/21  1408 12/12/21  1408 12/12/21  0910 12/12/21  0910 12/11/21  0124 12/10/21  2251 12/10/21  0044   SODIUM mmol/L 131* 132* 127*  --  131*  --  128*   < >  --  131*   POTASSIUM mmol/L 5.6* 5.4* 5.3*   < > 6.0*   < > 6.6*   < >  --  4.4   MAGNESIUM mg/dL  --   --   --   --   --   --   --   --  2.5 2.1   CHLORIDE mmol/L 97* 96* 95*   < > 96*   < > 95*   < >  --  98   CO2 mmol/L 22.0 23.7 19.3*   < > 18.5*   < > 14.6*   < >  --  16.5*   BUN mg/dL 82* 76* 71*   < > 68*   < > 63*   < >  --  23*   CREATININE mg/dL 3.18* 3.03* 2.99*  --  3.01*  --  2.69*   < >  --  1.05*   EGFR IF NONAFRICN AM mL/min/1.73 16* 17* 17*   < > 17*   < > 19*   < >  --  57*   CALCIUM mg/dL 7.4* 7.3* 7.5*   < > 7.6*   < > 8.0*   < >  --  8.4*    < > = values in this interval not displayed.     Estimated Creatinine Clearance: 32.8 mL/min (A) (by C-G formula based on SCr of 3.18 mg/dL (H)).    Liver and pancreatic function:  Results from last 7 days   Lab Units 12/13/21  0356 12/12/21  1736 12/12/21  0910 12/12/21  0057 12/11/21  1903 12/11/21  1008 12/11/21  0445   ALBUMIN g/dL 2.76* 2.63* 2.77*   < >  --    < >  --    BILIRUBIN mg/dL 5.5* 5.5* 5.1*   < >  --    < >  --    ALK PHOS U/L 192* 186* 193*   < >  --    < >  --    AST (SGOT) U/L 1,567* 2,069* 2,462*   < >  --    < >  --    ALT (SGPT) U/L 1,456* 1,577* 1,804*   < >   --    < >  --    AMMONIA umol/L  --   --   --   --  103*  --   --    AMYLASE U/L 226*  --   --   --   --   --   --    LIPASE U/L 225*  --   --   --   --   --  540*    < > = values in this interval not displayed.         Cardiac:  Results from last 7 days   Lab Units 12/09/21  0233   PROBNP pg/mL 4,147.0*     Liver and pancreatic function:  Results from last 7 days   Lab Units 12/13/21  0356 12/12/21  1736 12/12/21  0910 12/12/21  0057 12/11/21  1903 12/11/21  1008 12/11/21  0445   ALBUMIN g/dL 2.76* 2.63* 2.77*   < >  --    < >  --    BILIRUBIN mg/dL 5.5* 5.5* 5.1*   < >  --    < >  --    ALK PHOS U/L 192* 186* 193*   < >  --    < >  --    AST (SGOT) U/L 1,567* 2,069* 2,462*   < >  --    < >  --    ALT (SGPT) U/L 1,456* 1,577* 1,804*   < >  --    < >  --    AMMONIA umol/L  --   --   --   --  103*  --   --    AMYLASE U/L 226*  --   --   --   --   --   --    LIPASE U/L 225*  --   --   --   --   --  540*    < > = values in this interval not displayed.       Medications :     budesonide-formoterol, 2 puff, Inhalation, BID - RT  doxycycline, 100 mg, Intravenous, Q12H  ipratropium, 0.5 mg, Nebulization, 4x Daily - RT  lactulose, 30 g, Oral, BID  lactulose, 90 g, Oral, Daily  meropenem, 1 g, Intravenous, Q12H  methylPREDNISolone sodium succinate, 20 mg, Intravenous, Q12H  metoprolol tartrate, 50 mg, Oral, Q12H  sodium chloride, 10 mL, Intravenous, Q12H  sodium chloride, 10 mL, Intravenous, Q12H  thiamine, 100 mg, Intramuscular, Daily      DOBUTamine, 2-20 mcg/kg/min, Last Rate: 2.5 mcg/kg/min (12/12/21 0028)  heparin (porcine), 7.87 Units/kg/hr, Last Rate: 16.87 Units/kg/hr (12/13/21 9313)  norepinephrine, 0.02-0.3 mcg/kg/min, Last Rate: Stopped (12/12/21 1643)  Pharmacy Consult,           Assessment/Plan     1.  Acute renal failure with acute tubular necrosis with oliguria  2.  Hypoxic respiratory failure  3. Cardiogenic shock  4.  Shock liver  5.  Pulmonary embolism  6.  Pneumonia with sepsis  7.  Left ventricular  ejection fraction 20%  8.  Atrial fibrillation now with controlled ventricular rate  9.  Hyperkalemia  10.  Metabolic encephalopathy  11.  Hyponatremia likely hypervolumia    No improvement in renal functions, Cr around 3.0 and UOP is better. There is mild fluid overload clinicaly  Pt is on dobutamine, if continue to be in positive fluid balance, likely needs diuretics.   Hyperkalemia is better. Metabolic acidosis has resolved. I will give lokelma 10G two doses today.   Pt is awaiting transfer to [facility].  Prognosis guarded.      Kirstin Esparza MD  12/13/21  07:38 EST

## 2021-12-13 NOTE — PLAN OF CARE
Goal Outcome Evaluation:  Plan of Care Reviewed With: patient           Outcome Summary: VSS. 2L NC. Dobutamine contintues at 2.5. Heparin gtt per protocol at 13.86 at this time. Aflutter on monitor. Pt waiting for bed placment at . Pt alert to self only. Pt moans out during shift but denies pain or any request. Pt has dark yellow urine from leon cath. No bowel movement this shift. Will cont to monitor.

## 2021-12-13 NOTE — PLAN OF CARE
Goal Outcome Evaluation:  Plan of Care Reviewed With: patient        Progress: no change  Outcome Summary: VSS. Aflutter on monitor. 3L nc. Dobutamine continued @ 2.5. Heparin gtt continued and adjusted per protocol. Pt  unable to tolerate PO, lactulose enema ordered per nephrology. Small mucoid BMs. Pt cries out at times but has no specific complaints. Will continue to monitor.

## 2021-12-13 NOTE — PROGRESS NOTES
[facility] HOSPITALIST PROGRESS NOTE     Patient Identification:  Name:  Tiera Mendoza  Age:  46 y.o.  Sex:  female  :  1975  MRN:  [id number]  Visit Number:  [id number]  ROOM: [address]     Primary Care Provider:  Alan Martin APRN    Length of stay in inpatient status:  4    Subjective     Chief Compliant:    Chief Complaint   Patient presents with   • Shortness of Breath       History of Presenting Illness: Patient seen and evaluated in follow-up for acute hypoxemic hypercarbic respiratory failure secondary to sepsis with right-sided pneumonia and pulmonary embolism and severe cardiomyopathy.  Patient at time of examination this morning briefly able to wake up and moan and shake her head yes and no to basic questions but otherwise not completely interactive and remains mildly encephalopathic.    Objective     Current Hospital Meds:budesonide-formoterol, 2 puff, Inhalation, BID - RT  doxycycline, 100 mg, Intravenous, Q12H  ipratropium, 0.5 mg, Nebulization, 4x Daily - RT  lactulose, 30 g, Oral, BID  lactulose, 90 g, Oral, Daily  meropenem, 1 g, Intravenous, Q12H  methylPREDNISolone sodium succinate, 20 mg, Intravenous, Q12H  metoprolol tartrate, 25 mg, Oral, Q12H  sodium chloride, 10 mL, Intravenous, Q12H  sodium chloride, 10 mL, Intravenous, Q12H  sodium zirconium cyclosilicate, 10 g, Oral, BID  thiamine, 100 mg, Intramuscular, Daily    DOBUTamine, 2-20 mcg/kg/min, Last Rate: 2.5 mcg/kg/min (21 2344)  heparin (porcine), 7.87 Units/kg/hr, Last Rate: 13.86 Units/kg/hr (21 1500)  norepinephrine, 0.02-0.3 mcg/kg/min, Last Rate: Stopped (21 1643)  Pharmacy Consult,         Current Antimicrobial Therapy:  Anti-Infectives (From admission, onward)    Ordered     Dose/Rate Route Frequency Start Stop    21  meropenem (MERREM) 1 g in sodium chloride 0.9 % 100 mL IVPB-VTB        Ordering Provider: Natanael Toscano MD    1 g  over 3 Hours Intravenous Every 12 Hours 21  1200 12/16/21 1159    12/11/21 0319  meropenem (MERREM) 1 g in sodium chloride 0.9 % 100 mL IVPB-VTB        Ordering Provider: Gabriele Bo MD    1 g  over 30 Minutes Intravenous Once 12/11/21 0430 12/11/21 0435    12/09/21 2000  doxycycline (VIBRAMYCIN) 100 mg in sodium chloride 0.9 % 100 mL IVPB-VTB        Ordering Provider: Natanael Toscano MD    100 mg Intravenous Every 12 Hours 12/09/21 2100 12/16/21 2059    12/09/21 1539  piperacillin-tazobactam (ZOSYN) IVPB 3.375 g in 100 mL NS VTB        Ordering Provider: Natanael Toscano MD    3.375 g  over 30 Minutes Intravenous Once 12/09/21 1700 12/09/21 1637    12/09/21 0425  piperacillin-tazobactam (ZOSYN) IVPB 4.5 g in 100 mL NS VTB        Ordering Provider: Francisco Naranjo MD    4.5 g  over 30 Minutes Intravenous Once 12/09/21 0427 12/09/21 0556    12/09/21 0329  cefTRIAXone (ROCEPHIN) 2 g in sodium chloride 0.9 % 100 mL IVPB-VTB        Ordering Provider: Francisco Naranjo MD    2 g  200 mL/hr over 30 Minutes Intravenous Once 12/09/21 0331 12/09/21 0444        Current Diuretic Therapy:  Diuretics (From admission, onward)    None        ----------------------------------------------------------------------------------------------------------------------  Vital Signs:  Temp:  [97.4 °F (36.3 °C)-97.6 °F (36.4 °C)] 97.5 °F (36.4 °C)  Heart Rate:  [] 126  Resp:  [12-24] 18  BP: ()/() 133/96  SpO2:  [89 %-100 %] 91 %  on  Flow (L/min):  [2-3] 2;   Device (Oxygen Therapy): nasal cannula  Body mass index is 45.89 kg/m².    Wt Readings from Last 3 Encounters:   12/12/21 (!) 137 kg (301 lb 13 oz)   12/03/20 126 kg (276 lb 12.8 oz)   01/01/19 118 kg (260 lb)     Intake & Output (last 3 days)       12/10 0701  12/11 0700 12/11 0701  12/12 0700 12/12 0701  12/13 0700 12/13 0701  12/14 0700    P.O. 0 236 120 8    I.V. (mL/kg) 1661.2 (12.3) 1853 (14) 1341.2 (9.8) 329.5 (2.4)    IV Piggyback   50 250    Total Intake(mL/kg) 1661.2 (12.3)  2089 (15.8) 1511.2 (11) 587.5 (4.3)    Urine (mL/kg/hr) 300 (0.1) 558 (0.2) 450 (0.1) 400 (0.2)    Stool   0     Total Output 300 558 450 400    Net +1361.2 +1531 +1061.2 +187.5            Urine Unmeasured Occurrence 1 x 1 x 1 x     Stool Unmeasured Occurrence   2 x         Diet Clear Liquid  ----------------------------------------------------------------------------------------------------------------------  Physical exam:  Constitutional: Morbidly obese adult female resting in bed, NAD  HENT:  Head:  Normocephalic and atraumatic.  Mouth:  Moist mucous membranes.    Eyes:  Conjunctivae and EOM are normal. No scleral icterus.     Cardiovascular: Irregularly irregular with no murmur, rub, or gallop  Pulmonary/Chest: Decreased breath sounds throughout with poor air movement.  Abdominal:  Soft.  Bowel sounds are normal.  No distension and no tenderness.   Musculoskeletal:  No edema, no tenderness, and no deformity.    Neurological: Somnolent and lethargic and unable to accurately assess orientation.  No focal neurological deficits on limited exam  Skin:  Skin is warm and dry. No rash or lesion noted. No pallor.   Peripheral vascular:  Pulses in all 4 extremities with no clubbing, no cyanosis.    ----------------------------------------------------------------------------------------------------------------------  Tele:    ----------------------------------------------------------------------------------------------------------------------  Results from last 7 days   Lab Units 12/13/21  0356 12/12/21  0057 12/11/21  0444 12/10/21  2251 12/10/21  0044 12/09/21 2026 12/09/21  1110 12/09/21  0426   CRP mg/dL 16.36* 22.87*  --  27.13*   < >  --   --   --    LACTATE mmol/L  --  3.9*  --   --   --  3.6*  --  3.6*   WBC 10*3/mm3 26.58* 27.26*  --  31.70*   < >  --   --   --    HEMOGLOBIN g/dL 10.0* 10.5*  --  9.8*   < >  --   --   --    HEMATOCRIT % 32.1* 34.7  --  33.7*   < >  --   --   --    MCV fL 78.1* 81.6  --  84.0   <  >  --   --   --    MCHC g/dL 31.2* 30.3*  --  29.1*   < >  --   --   --    PLATELETS 10*3/mm3 134* 198  --  256   < >  --   --   --    INR   --  3.62* 3.21*  --   --   --  1.66* 1.42*    < > = values in this interval not displayed.     Results from last 7 days   Lab Units 12/13/21  0431   PH, ARTERIAL pH units 7.395   PO2 ART mm Hg 94.3   PCO2, ARTERIAL mm Hg 42.0   HCO3 ART mmol/L 25.7     Results from last 7 days   Lab Units 12/13/21  1216 12/13/21  0356 12/12/21  2139 12/12/21  1736 12/12/21  1736 12/12/21  1408 12/12/21  0910 12/11/21  0124 12/10/21  2251 12/10/21  0044   SODIUM mmol/L 130* 131* 132*   < > 127*   < > 128*   < >  --  131*   POTASSIUM mmol/L 5.3* 5.6* 5.4*   < > 5.3*   < > 6.6*   < >  --  4.4   MAGNESIUM mg/dL  --   --   --   --   --   --   --   --  2.5 2.1   CHLORIDE mmol/L 97* 97* 96*   < > 95*   < > 95*   < >  --  98   CO2 mmol/L 21.8* 22.0 23.7   < > 19.3*   < > 14.6*   < >  --  16.5*   BUN mg/dL 90* 82* 76*   < > 71*   < > 63*   < >  --  23*   CREATININE mg/dL 3.27* 3.18* 3.03*   < > 2.99*   < > 2.69*   < >  --  1.05*   EGFR IF NONAFRICN AM mL/min/1.73 15* 16* 17*   < > 17*   < > 19*   < >  --  57*   CALCIUM mg/dL 7.3* 7.4* 7.3*   < > 7.5*   < > 8.0*   < >  --  8.4*   GLUCOSE mg/dL 171* 155* 148*   < > 165*   < > 113*   < >  --  198*   ALBUMIN g/dL  --  2.76*  --   --  2.63*  --  2.77*   < >  --  2.80*   BILIRUBIN mg/dL  --  5.5*  --   --  5.5*  --  5.1*   < >  --  3.1*   ALK PHOS U/L  --  192*  --   --  186*  --  193*   < >  --  180*   AST (SGOT) U/L  --  1,567*  --   --  2,069*  --  2,462*   < >  --  242*   ALT (SGPT) U/L  --  1,456*  --   --  1,577*  --  1,804*   < >  --  222*    < > = values in this interval not displayed.   Estimated Creatinine Clearance: 31.9 mL/min (A) (by C-G formula based on SCr of 3.27 mg/dL (H)).  Ammonia   Date Value Ref Range Status   12/11/2021 103 (H) 11 - 51 umol/L Final     Results from last 7 days   Lab Units 12/13/21  0356 12/12/21  0057 12/11/21  0445  12/11/21  0124 12/10/21  2251 12/10/21  0303   CK TOTAL U/L 859* 1,495* 2,338*  --    < >  --    TROPONIN T ng/mL  --   --  0.019 0.015  --  <0.010    < > = values in this interval not displayed.     Results from last 7 days   Lab Units 12/09/21  0233   PROBNP pg/mL 4,147.0*         Glucose   Date/Time Value Ref Range Status   12/13/2021 1729 155 (H) 70 - 130 mg/dL Final     Comment:     Meter: [id number] : [id number] JAYLA [place]   12/13/2021 1239 179 (H) 70 - 130 mg/dL Final     Comment:     Meter: [id number] : [id number] JAYLA ALVAREZ   12/13/2021 0619 191 (H) 70 - 130 mg/dL Final     Comment:     Meter: [id number] : [id number] APARNA EUBANKSSTOSEAS   12/12/2021 2347 162 (H) 70 - 130 mg/dL Final     Comment:     Meter: [id number] : [id number] Goncalves Marge   12/12/2021 1722 155 (H) 70 - 130 mg/dL Final     Comment:     Meter: [id number] : [id number] Raza Alesia   12/12/2021 1130 173 (H) 70 - 130 mg/dL Final     Comment:     Meter: [id number] : [id number] FISH VITALE   12/12/2021 0604 202 (H) 70 - 130 mg/dL Final     Comment:     RN Notified Meter: [id number] : [id number] JÚNIOR ULRICH   12/11/2021 2047 193 (H) 70 - 130 mg/dL Final     Comment:     RN Notified Meter: [id number] : [id number] JÚNIOR [facility]     Lab Results   Component Value Date    TSH 4.040 12/10/2021    FREET4 0.81 (L) 12/10/2021     Lab Results   Component Value Date    PREGTESTUR Negative 12/03/2020     Pain Management Panel     Pain Management Panel Latest Ref Rng & Units 12/11/2021 12/11/2021    CREATININE UR mg/dL 108.5 108.5    AMPHETAMINES SCREEN, URINE Negative - -    BARBITURATES SCREEN Negative - -    BENZODIAZEPINE SCREEN, URINE Negative - -    BUPRENORPHINEUR Negative - -    COCAINE SCREEN, URINE Negative - -    METHADONE SCREEN, URINE Negative - -    METHAMPHETAMINEUR Negative - -        Brief Urine Lab Results  (Last result in the past 365 days)      Color   Clarity   Blood   Leuk Est    Nitrite   Protein   CREAT   Urine HCG        12/11/21 1328             108.5         12/11/21 1328             108.5             Blood Culture   Date Value Ref Range Status   12/09/2021 No growth at 4 days  Preliminary   12/09/2021 No growth at 4 days  Preliminary     No results found for: URINECX  No results found for: WOUNDCX  No results found for: STOOLCX  No results found for: RESPCX  No results found for: AFBCX  Results from last 7 days   Lab Units 12/13/21  0356 12/12/21  0057 12/10/21  2251 12/10/21  0044 12/09/21 2026 12/09/21  0426 12/09/21  0233   LACTATE mmol/L  --  3.9*  --   --  3.6* 3.6* 2.9*   CRP mg/dL 16.36* 22.87* 27.13* 30.87*  --   --  26.44*       I have personally looked at the labs and they are summarized above.  ----------------------------------------------------------------------------------------------------------------------  Detailed radiology reports for the last 24 hours:    Imaging Results (Last 24 Hours)     ** No results found for the last 24 hours. **        Assessment & Plan      Severe sepsis  Septic/cardiogenic shock  Acute hypoxemic hypercarbic respiratory failure  Acute pulmonary embolism  Lactic acidemia    -Continue heparin GTT for anticoagulation    -Levophed titrated off, currently only on dobutamine to maintain MAP greater than 65 for inotropic support    -Continue meropenem, doxycycline    -Continue supplemental oxygen to maintain O2 saturation 92% or greater    -Cardiology consulted and following along, appreciate input and help unable to initiate guideline directed medical therapy at this time due to hypotension and renal failure.  However cardiology did arrange for and have patient accepted to heart failure/heart transplant unit at [facility] pending bed availability.    -TTE shows EF less than 20% with severely decreased LV systolic function with moderately dilated left ventricular cavity.  No pericardial effusion.    Acute kidney  injury  Rhabdomyolysis  Hyperkalemia    -Suspected secondary to ATN and possible contrast-induced nephropathy.  Creatinine still slowly trending upward at this time.    -Nephrology consulted and following along, appreciate input and help.  Is in agreement regarding dobutamine.    -CK improving, continue supportive care    -Insulin D50 with Lokelma    Cirrhosis  Shock Liver, improving  Metabolic encephalopathy    -Continue lactulose, have increased to 30 g 3 times daily    -Continue heparin for atrial fibrillation and acute PE given prothrombotic nature of patient's cirrhosis with elevated INR    -liver enzymes trending favorably, will repeat INR check tomorrow    Polysubstance abuse    -Patient positive for methamphetamines at admission    -Complicates all aspects of care    VTE Prophylaxis:   Mechanical Order History:     None      Pharmalogical Order History:      Ordered     Dose Route Frequency Stop    12/09/21 0425  heparin (porcine) 5000 UNIT/ML injection 5,000 Units         5,000 Units IV Once 12/09/21 0449    12/09/21 0425  heparin 20788 units/250 mL (100 units/mL) in 0.45 % NaCl infusion  9.99 mL/hr         7.87 Units/kg/hr IV Titrated --    12/09/21 0425  heparin (porcine) 5000 UNIT/ML injection 5,000 Units         5,000 Units IV As Needed --    12/09/21 0425  heparin (porcine) 5000 UNIT/ML injection 2,500 Units         2,500 Units IV As Needed --                Disposition pending bed at [facility] with heart failure/heart transplant service.    Isael Barclay DO  [facility] Hospitalist  12/13/21  18:51 EST

## 2021-12-14 NOTE — NURSING NOTE
Spoke with Kajal at [facility] and gave update on patient; no bed available at this time; they will continue to follow daily.

## 2021-12-14 NOTE — PROGRESS NOTES
Nephrology Progress Note      Subjective     Pt is awake but not fully oriented and was not communicative well. Denied chest pain but has SOB    Objective       Vital signs :     Temp:  [97.5 °F (36.4 °C)-97.6 °F (36.4 °C)] 97.6 °F (36.4 °C)  Heart Rate:  [] 111  Resp:  [13-20] 13  BP: ()/() 118/83      Intake/Output Summary (Last 24 hours) at 12/14/2021 0755  Last data filed at 12/13/2021 1700  Gross per 24 hour   Intake 587.46 ml   Output 400 ml   Net 187.46 ml       Physical Exam:    General Appearance : not in acute distress  Lungs : B/L lower zone crackles  Heart :  regular rhythm & normal rate, normal S1, S2 and no murmur, no rub  Abdomen : normal bowel sounds, no masses, no hepatomegaly, no splenomegaly, soft non-tender and no guarding  Extremities : trace edema, no cyanosis and no redness  Neurologic :   Unable to assess.     Laboratory Data :     Albumin Albumin   Date Value Ref Range Status   12/14/2021 2.49 (L) 3.50 - 5.20 g/dL Final   12/13/2021 2.76 (L) 3.50 - 5.20 g/dL Final   12/12/2021 2.63 (L) 3.50 - 5.20 g/dL Final   12/12/2021 2.77 (L) 3.50 - 5.20 g/dL Final   12/12/2021 2.91 (L) 3.50 - 5.20 g/dL Final   12/11/2021 2.76 (L) 3.50 - 5.20 g/dL Final   12/11/2021 2.94 (L) 3.50 - 5.20 g/dL Final      Magnesium No results found for: MG       PTH               No results found for: PTH    CBC and coagulation:  Results from last 7 days   Lab Units 12/14/21  0401 12/13/21  0356 12/12/21  0057 12/11/21  0444 12/10/21  2251 12/10/21  2251 12/10/21  0044 12/09/21  2026 12/09/21  1110 12/09/21  0426 12/09/21  0426   LACTATE mmol/L  --   --  3.9*  --   --   --   --  3.6*  --   --  3.6*   CRP mg/dL  --  16.36* 22.87*  --   --  27.13*   < >  --   --   --   --    WBC 10*3/mm3 27.78* 26.58* 27.26*  --    < > 31.70*   < >  --   --   --   --    HEMOGLOBIN g/dL 9.8* 10.0* 10.5*  --    < > 9.8*   < >  --   --   --   --    HEMATOCRIT % 31.1* 32.1* 34.7  --    < > 33.7*   < >  --   --   --   --    MCV  fL 77.0* 78.1* 81.6  --    < > 84.0   < >  --   --   --   --    MCHC g/dL 31.5 31.2* 30.3*  --    < > 29.1*   < >  --   --   --   --    PLATELETS 10*3/mm3 101* 134* 198  --    < > 256   < >  --   --   --   --    INR   --   --  3.62* 3.21*  --   --   --   --  1.66*   < > 1.42*    < > = values in this interval not displayed.     Acid/base balance:  Results from last 7 days   Lab Units 12/13/21  0431 12/11/21  1944 12/11/21  1138   PH, ARTERIAL pH units 7.395 7.323* 7.331*   PO2 ART mm Hg 94.3 74.6* 79.0*   PCO2, ARTERIAL mm Hg 42.0 43.8 37.3   HCO3 ART mmol/L 25.7 22.7 19.7*     Renal and electrolytes:  Results from last 7 days   Lab Units 12/14/21  0401 12/13/21  1216 12/13/21  0356 12/12/21  2139 12/12/21  2139 12/12/21  1736 12/12/21  1736 12/11/21  0124 12/10/21  2251 12/10/21  0044   SODIUM mmol/L 132* 130* 131*  --  132*  --  127*   < >  --  131*   POTASSIUM mmol/L 5.1 5.3* 5.6*   < > 5.4*   < > 5.3*   < >  --  4.4   MAGNESIUM mg/dL  --   --   --   --   --   --   --   --  2.5 2.1   CHLORIDE mmol/L 98 97* 97*   < > 96*   < > 95*   < >  --  98   CO2 mmol/L 19.1* 21.8* 22.0   < > 23.7   < > 19.3*   < >  --  16.5*   BUN mg/dL 95* 90* 82*   < > 76*   < > 71*   < >  --  23*   CREATININE mg/dL 3.12* 3.27* 3.18*  --  3.03*  --  2.99*   < >  --  1.05*   EGFR IF NONAFRICN AM mL/min/1.73 16* 15* 16*   < > 17*   < > 17*   < >  --  57*   CALCIUM mg/dL 7.4* 7.3* 7.4*   < > 7.3*   < > 7.5*   < >  --  8.4*    < > = values in this interval not displayed.     Estimated Creatinine Clearance: 33.5 mL/min (A) (by C-G formula based on SCr of 3.12 mg/dL (H)).    Liver and pancreatic function:  Results from last 7 days   Lab Units 12/14/21  0401 12/13/21  0356 12/12/21  1736 12/12/21  0057 12/11/21  1903 12/11/21  1008 12/11/21  0445   ALBUMIN g/dL 2.49* 2.76* 2.63*   < >  --    < >  --    BILIRUBIN mg/dL 4.7* 5.5* 5.5*   < >  --    < >  --    ALK PHOS U/L 182* 192* 186*   < >  --    < >  --    AST (SGOT) U/L 661* 1,567* 2,069*   < >   --    < >  --    ALT (SGPT) U/L 1,101* 1,456* 1,577*   < >  --    < >  --    AMMONIA umol/L  --   --   --   --  103*  --   --    AMYLASE U/L  --  226*  --   --   --   --   --    LIPASE U/L  --  225*  --   --   --   --  540*    < > = values in this interval not displayed.         Cardiac:  Results from last 7 days   Lab Units 12/09/21  0233   PROBNP pg/mL 4,147.0*     Liver and pancreatic function:  Results from last 7 days   Lab Units 12/14/21  0401 12/13/21  0356 12/12/21  1736 12/12/21  0057 12/11/21  1903 12/11/21  1008 12/11/21  0445   ALBUMIN g/dL 2.49* 2.76* 2.63*   < >  --    < >  --    BILIRUBIN mg/dL 4.7* 5.5* 5.5*   < >  --    < >  --    ALK PHOS U/L 182* 192* 186*   < >  --    < >  --    AST (SGOT) U/L 661* 1,567* 2,069*   < >  --    < >  --    ALT (SGPT) U/L 1,101* 1,456* 1,577*   < >  --    < >  --    AMMONIA umol/L  --   --   --   --  103*  --   --    AMYLASE U/L  --  226*  --   --   --   --   --    LIPASE U/L  --  225*  --   --   --   --  540*    < > = values in this interval not displayed.       Medications :     budesonide-formoterol, 2 puff, Inhalation, BID - RT  doxycycline, 100 mg, Intravenous, Q12H  ipratropium, 0.5 mg, Nebulization, 4x Daily - RT  lactulose, 30 g, Oral, TID  meropenem, 1 g, Intravenous, Q12H  methylPREDNISolone sodium succinate, 20 mg, Intravenous, Q24H  metoprolol tartrate, 25 mg, Oral, Q12H  sodium chloride, 10 mL, Intravenous, Q12H  sodium chloride, 10 mL, Intravenous, Q12H  thiamine, 100 mg, Intramuscular, Daily      DOBUTamine, 2-20 mcg/kg/min, Last Rate: 2.5 mcg/kg/min (12/14/21 0350)  heparin (porcine), 7.87 Units/kg/hr, Last Rate: 10.87 Units/kg/hr (12/14/21 7748)  norepinephrine, 0.02-0.3 mcg/kg/min, Last Rate: Stopped (12/12/21 2862)  Pharmacy Consult,         Assessment/Plan     1.  Acute renal failure with acute tubular necrosis with oliguria  2.  Hypoxic respiratory failure  3.  Cardiogenic shock  4.  Shock liver  5.  Pulmonary embolism  6.  Pneumonia with  sepsis  7.  Left ventricular ejection fraction 20%  8.  Atrial fibrillation now with controlled ventricular rate  9.  Hyperkalemia  10. Metabolic encephalopathy  11. Hyponatremia likely hypervolumia    Pt has significant fluid overload, that is worsening clinically with positive fluid balance.   Mild metabolic acidosis, to monitor, lasix will likely help  Will give IV albumin followed by lasix drip and to continue on dobutamine. Pt is awaiting bed for transfer.    Pt had several BM and likely hyperkalemia will be better.    Pt is awaiting transfer to [facility].  Prognosis guarded.      Kirstin Esparza MD  12/14/21  07:55 EST

## 2021-12-14 NOTE — PLAN OF CARE
Goal Outcome Evaluation:  Plan of Care Reviewed With: patient        Progress: no change  Outcome Summary: VSS. Aflutter. 2L nc. Dobutamine continued at 2.5. Heparin gtt continued and adjusted per protocol. Remains disoriented and drowsy. Multiple large, loose BMs. IV abx continued. [facility] called for update, still awaiting bed. Will continue to monitor.

## 2021-12-14 NOTE — PROGRESS NOTES
LOS: 5 days     Name: Tiera Ridner  Age/Sex: 46 y.o. female  :  1975        PCP: Alan Martin APRN  REF: No Known Provider    Active Problems:    Single subsegmental pulmonary embolism without acute cor pulmonale (HCC)      Reason for follow-up: Atrial fibrillation with RVR and dilated cardiomyopathy    Subjective       Subjective     46-year-old woman has been admitted with chief complaint of progressive shortness of breath for the last 3 months.     Interval History: Remains in atrial flutter.  Currently on IV dobutamine and IV heparin.  Hypotensive.  Awaiting bed at .  Reports she is feeling some better.    Vital Signs  Temp:  [97.3 °F (36.3 °C)-97.6 °F (36.4 °C)] 97.3 °F (36.3 °C)  Heart Rate:  [] 101  Resp:  [13-20] 13  BP: ()/() 114/77     Vital Signs (last 72 hrs)        0700   0659  0700   0659  0700   0659  0700   1409   Most Recent      Temp (°F) 97.2 -  97.8    97.3 -  97.9    97.5 -  97.6      97.3     97.3 (36.3)  0800    Heart Rate 50 -  118    82 -  124    92 -  128    94 -  156     101  1300    Resp 9 -  28    8 -  24    13 -  20       13  0640    BP 80/72 -  135/98    76/48 -  137/104    84/77 -  141/103    79/47 -  180/129     114/77  1300    SpO2 (%) 89 -  100    87 -  100    89 -  100    88 -  100     89  1300        Documented weights    21 0155 12/09/21 2001 12/10/21 0500 21 0500   Weight: 127 kg (280 lb) 131 kg (288 lb 8 oz) 131 kg (288 lb 12.8 oz) 135 kg (298 lb 3.2 oz)    21 0500 21 1455   Weight: 132 kg (290 lb 11.2 oz) (!) 137 kg (301 lb 13 oz)      Body mass index is 45.89 kg/m².    Intake/Output Summary (Last 24 hours) at 2021 1409  Last data filed at 2021 0800  Gross per 24 hour   Intake 545.46 ml   Output 400 ml   Net 145.46 ml     Objective    Objective       Physical Exam:     General Appearance:    Alert, cooperative, in no acute distress   Head:     Normocephalic, without obvious abnormality, atraumatic   Eyes:            Conjunctivae and sclerae normal, no   icterus, no pallor, corneas clear.   Neck:   No adenopathy, supple, trachea midline, no thyromegaly, no   carotid bruit, no JVD   Lungs:     Clear to auscultation,respirations regular, even and                  unlabored    Heart:    irregular rhythm and normal rate, normal S1 and S2, no            murmur, no gallop, no rub, no click   Chest Wall:    No abnormalities observed   Abdomen:     Normal bowel sounds, no masses, no organomegaly, soft        non-tender, non-distended, no guarding, no rebound                tenderness   Extremities:   Moves all extremities well, no edema, no cyanosis, no             redness   Pulses:   Pulses palpable and equal bilaterally   Skin:   No bleeding, bruising or rash       Neurologic:  Alert but not fully oriented     Results review       Results Review:   Results from last 7 days   Lab Units 12/14/21  0401 12/13/21  0356 12/12/21  0057 12/10/21  2251 12/10/21  0044 12/09/21  0233   WBC 10*3/mm3 27.78* 26.58* 27.26* 31.70* 19.78* 29.87*   HEMOGLOBIN g/dL 9.8* 10.0* 10.5* 9.8* 10.4* 10.5*   PLATELETS 10*3/mm3 101* 134* 198 256 267 318     Results from last 7 days   Lab Units 12/14/21  0401 12/13/21  1216 12/13/21  0356 12/12/21  2139 12/12/21  1736 12/12/21  1408 12/12/21  0910 12/12/21  0057 12/12/21  0057 12/11/21  1819 12/11/21  1819 12/11/21  1008 12/11/21  1008   SODIUM mmol/L 132* 130* 131* 132* 127* 131* 128*   < > 131*   < > 128*   < > 130*   POTASSIUM mmol/L 5.1 5.3* 5.6* 5.4* 5.3* 6.0* 6.6*   < > 5.7*   < > 5.1   < > 6.1*   CHLORIDE mmol/L 98 97* 97* 96* 95* 96* 95*   < > 96*   < > 96*   < > 96*   CO2 mmol/L 19.1* 21.8* 22.0 23.7 19.3* 18.5* 14.6*   < > 19.3*   < > 17.0*   < > 16.1*   BUN mg/dL 95* 90* 82* 76* 71* 68* 63*   < > 60*   < > 51*   < > 44*   CREATININE mg/dL 3.12* 3.27* 3.18* 3.03* 2.99* 3.01* 2.69*   < > 2.54*   < > 2.47*   < > 2.22*   CALCIUM  mg/dL 7.4* 7.3* 7.4* 7.3* 7.5* 7.6* 8.0*   < > 8.0*   < > 7.8*   < > 8.4*   GLUCOSE mg/dL 150* 171* 155* 148* 165* 166* 113*   < > 144*   < > 164*   < > 97   ALT (SGPT) U/L 1,101*  --  1,456*  --  1,577*  --  1,804*  --  1,835*  --  1,875*  --  1,823*   AST (SGOT) U/L 661*  --  1,567*  --  2,069*  --  2,462*  --  2,999*  --  3,842*  --  3,887*    < > = values in this interval not displayed.     Results from last 7 days   Lab Units 12/13/21  0356 12/12/21  0057 12/11/21  0445 12/11/21  0124 12/10/21  2251 12/10/21  0303 12/09/21  2132 12/09/21  1636   CK TOTAL U/L 859* 1,495* 2,338*  --  2,714*  --   --   --    TROPONIN T ng/mL  --   --  0.019 0.015  --  <0.010 <0.010 <0.010     Lab Results   Component Value Date    INR 2.25 (H) 12/14/2021    INR 3.62 (H) 12/12/2021    INR 3.21 (H) 12/11/2021    INR 1.66 (H) 12/09/2021    INR 1.42 (H) 12/09/2021     Lab Results   Component Value Date    MG 2.5 12/10/2021    MG 2.1 12/10/2021     Lab Results   Component Value Date    TSH 4.040 12/10/2021      Imaging Results (Last 48 Hours)     Procedure Component Value Units Date/Time    XR Chest 1 View [[id number]] Collected: 12/12/21 1436     Updated: 12/12/21 1439    Narrative:      EXAM:    XR Chest, 1 View     EXAM DATE:    12/12/2021 1:58 PM     CLINICAL HISTORY:    central line placement verification; I26.93-Single subsegmental  pulmonary embolism without acute cor pulmonale; J18.9-Pneumonia,  unspecified organism; A41.9-Sepsis, unspecified organism     TECHNIQUE:    Frontal view of the chest.     COMPARISON:    12/09/2021     FINDINGS:    Lungs:  Worsening by lateral airspace disease which may represent  edema.    Pleural space:  No pneumothorax.  Small effusions.    Heart:  Cardiomegaly.    Mediastinum:  Unremarkable.    Bones/joints:  Unremarkable.    Tubes, lines and devices:  Right IJ deep line placement with tip at  the cavoatrial junction.       Impression:      1.  Right IJ deep line placement. No pneumothorax.  2.   Worsening changes of probable CHF/edema.     This report was finalized on 12/12/2021 2:37 PM by Dr. Julius Arvizu MD.           Echo   Results for orders placed during the hospital encounter of 12/09/21    Adult Transthoracic Echo Complete W/ Cont if Necessary Per Protocol    Interpretation Summary  · The left ventricular cavity is moderately dilated.  · Left ventricular ejection fraction appears to be less than 20%. Left ventricular systolic function is severely decreased.  · The aortic valve is structurally normal with no regurgitation or stenosis present.  · The mitral valve is grossly normal in structure. Mild to moderate mitral valve regurgitation is present. No significant mitral valve stenosis is present.  · There is no evidence of pericardial effusion.     I reviewed the patient's new clinical results.    Telemetry: Atrial flutter 90 to 110 bpm     Medication Review:   budesonide-formoterol, 2 puff, Inhalation, BID - RT  doxycycline, 100 mg, Intravenous, Q12H  furosemide (LASIX) in NS IVPB, 100 mg, Intravenous, Once  lactulose, 30 g, Oral, TID  meropenem, 1 g, Intravenous, Q12H  methylPREDNISolone sodium succinate, 20 mg, Intravenous, Q24H  metoprolol tartrate, 25 mg, Oral, Q12H  sodium chloride, 10 mL, Intravenous, Q12H  sodium chloride, 10 mL, Intravenous, Q12H  thiamine, 100 mg, Intramuscular, Daily        DOBUTamine, 2-20 mcg/kg/min, Last Rate: 2.5 mcg/kg/min (12/14/21 0350)  heparin (porcine), 7.87 Units/kg/hr, Last Rate: 10.87 Units/kg/hr (12/14/21 2305)  norepinephrine, 0.02-0.3 mcg/kg/min, Last Rate: Stopped (12/12/21 5463)  Pharmacy Consult,         Assessment      Assessment:  1. Acute systolic heart failure with severe left ventricular systolic dysfunction EF less than 20%  2. Persistent atrial fibrillation/flutter  3. Acute pulmonary embolism  4. Shock liver  5. Acute renal failure secondary to poor perfusion  6. Acute hypoxic respiratory failure due to pneumonia PE and heart  failure        Plan     Recommendations:  1. Patient is on dobutamine drip she has been also diuresing with the help of nephrology who is adjusting the diuretic dose  2. Unable to initiate GDMT because of hypotension and renal failure  3. Pending transfer to [facility] for heart transplant service evaluation    I discussed the patients findings and my recommendations with patient and family      Electronically signed by PEDRO Adan, 12/14/21, 2:10 PM EST.  Electronically signed by Teresa Moya MD, 12/14/21, 4:17 PM EST.    Please note that portions of this note were completed with a voice recognition program.

## 2021-12-14 NOTE — PROGRESS NOTES
Antimicrobial Length of Therapy:    Day 6 of 7 doxycycline  Day 4 of 5 meropenem    Thank you.  Munira Flores, Pharm.D.  12/14/2021  11:15 EST

## 2021-12-14 NOTE — PROGRESS NOTES
Dr. NEDA Murphy    [facility] CRITICAL CARE    12/14/2021    Patient ID:  Name:  Tiera Mendoza  MRN:  [id number]  1975  46 y.o.  female            CC/Reason for visit: Pulmonary embolism, severe sepsis, a flutter     Interval hx: Patient is more awake and alert today.  She does follow commands.  She denies any pain.  Still on dobutamine drip.  Only on 2 L via nasal cannula.     ROS:  Denies chest pain or abdominal pain    Vitals:  Vitals:    12/14/21 1015 12/14/21 1030 12/14/21 1045 12/14/21 1100   BP: (!) 79/47 104/91 108/96 105/81   Pulse: 112 109 111 107   Resp:       Temp:       TempSrc:       SpO2: 97% 92% 99% 94%   Weight:       Height:               Body mass index is 45.89 kg/m².    Intake/Output Summary (Last 24 hours) at 12/14/2021 1140  Last data filed at 12/14/2021 0800  Gross per 24 hour   Intake 545.46 ml   Output 400 ml   Net 145.46 ml       Exam:  GEN:  No distress  Alert, oriented x 2.   LUNGS: Large body habitus hampers auscultation, distant and diminished breath sounds bilat, no use of accessory muscles  CV:  Normal S1S2, without murmur, 2+ leg edema  ABD:  Non tender, obesity hampers rest of the exam      Scheduled meds:  budesonide-formoterol, 2 puff, Inhalation, BID - RT  doxycycline, 100 mg, Intravenous, Q12H  furosemide (LASIX) in NS IVPB, 100 mg, Intravenous, Once  ipratropium, 0.5 mg, Nebulization, 4x Daily - RT  lactulose, 30 g, Oral, TID  meropenem, 1 g, Intravenous, Q12H  methylPREDNISolone sodium succinate, 20 mg, Intravenous, Q24H  metoprolol tartrate, 25 mg, Oral, Q12H  sodium chloride, 10 mL, Intravenous, Q12H  sodium chloride, 10 mL, Intravenous, Q12H  thiamine, 100 mg, Intramuscular, Daily      IV meds:                      DOBUTamine, 2-20 mcg/kg/min, Last Rate: 2.5 mcg/kg/min (12/14/21 0350)  heparin (porcine), 7.87 Units/kg/hr, Last Rate: 10.87 Units/kg/hr (12/14/21 8611)  norepinephrine, 0.02-0.3 mcg/kg/min, Last Rate: Stopped (12/12/21 8753)  Pharmacy Consult,          Data Review:   I reviewed the patient's medications and new clinical results.            Results from last 7 days   Lab Units 12/14/21  0401 12/13/21  1216 12/13/21  0356 12/12/21  2139 12/12/21  1736 12/12/21  0910 12/12/21  0057 12/11/21  1008 12/11/21  0444 12/09/21  0426 12/09/21  0233   SODIUM mmol/L 132* 130* 131*   < > 127*   < > 131*   < >  --    < > 130*   POTASSIUM mmol/L 5.1 5.3* 5.6*   < > 5.3*   < > 5.7*   < >  --    < > 4.3   CHLORIDE mmol/L 98 97* 97*   < > 95*   < > 96*   < >  --    < > 93*   CO2 mmol/L 19.1* 21.8* 22.0   < > 19.3*   < > 19.3*   < >  --    < > 24.0   BUN mg/dL 95* 90* 82*   < > 71*   < > 60*   < >  --    < > 22*   CREATININE mg/dL 3.12* 3.27* 3.18*   < > 2.99*   < > 2.54*   < >  --    < > 1.05*   CALCIUM mg/dL 7.4* 7.3* 7.4*   < > 7.5*   < > 8.0*   < >  --    < > 8.7   BILIRUBIN mg/dL 4.7*  --  5.5*  --  5.5*   < > 4.8*   < >  --    < > 2.8*   ALK PHOS U/L 182*  --  192*  --  186*   < > 172*   < >  --    < > 113   ALT (SGPT) U/L 1,101*  --  1,456*  --  1,577*   < > 1,835*   < >  --    < > 191*   AST (SGOT) U/L 661*  --  1,567*  --  2,069*   < > 2,999*   < >  --    < > 134*   GLUCOSE mg/dL 150* 171* 155*   < > 165*   < > 144*   < >  --    < > 136*   WBC 10*3/mm3 27.78*  --  26.58*  --   --   --  27.26*  --   --    < > 29.87*   HEMOGLOBIN g/dL 9.8*  --  10.0*  --   --   --  10.5*  --   --    < > 10.5*   PLATELETS 10*3/mm3 101*  --  134*  --   --   --  198  --   --    < > 318   INR  2.25*  --   --   --   --   --  3.62*  --  3.21*   < >  --    PROBNP pg/mL  --   --   --   --   --   --   --   --   --   --  4,147.0*    < > = values in this interval not displayed.           ASSESSMENT:   Severe sepsis rule out  Cardiogenic shock with low ejection fraction  Pulmonary embolism  Pulmonary infiltrates  Hyponatremia  Hyperkalemia  Acute kidney injury  Atrial flutter  Elevated liver enzymes  Morbid obesity  History of amphetamine use  History of asthma  Right pleural  effusion  Dysphagia        PLAN:  I do not believe this patient has sepsis.  I think she has cardiogenic shock.  Her ejection fraction is 20%.  Cardiology has the patient on dobutamine drip.  We are waiting to hear from [facility] whether they can accept her for LVAD.  She continues in atrial flutter.  For her pulmonary embolism she continues on heparin drip.  Replace potassium.  Nephrology is planning on diuretic drip.  Would repeat surveillance labs particularly potassium, magnesium and phosphorus check twice a day while on diuretic drip in order to aggressively replace his electrolytes and avoid any further arrhythmia.  Stop all antibiotics.  Doubt she has sepsis.  This seems more like cardiogenic shock.  Oxygenation is adequate on a few liters via nasal cannula.    Total critical care time 35 minutes excluding any separately billable procedures        Junaid Murphy MD  12/14/2021

## 2021-12-14 NOTE — PROGRESS NOTES
[facility] HOSPITALIST PROGRESS NOTE     Patient Identification:  Name:  Tiera Mendoza  Age:  46 y.o.  Sex:  female  :  1975  MRN:  [id number]  Visit Number:  [id number]  ROOM: [address]     Primary Care Provider:  Alan Martin APRN    Length of stay in inpatient status:  5    Subjective     Chief Compliant:    Chief Complaint   Patient presents with   • Shortness of Breath       History of Presenting Illness: Patient seen and evaluated in follow-up for acute hypoxemic hypercarbic respiratory failure secondary to sepsis with right-sided pneumonia and pulmonary embolism and acute severe cardiomyopathy with cardiogenic shock.  Patient at time of examination this morning less lethargic but still somnolent but able to answer questions with simple yes and no.    Objective     Current Hospital Meds:budesonide-formoterol, 2 puff, Inhalation, BID - RT  furosemide (LASIX) in NS IVPB, 100 mg, Intravenous, Once  lactulose, 30 g, Oral, TID  methylPREDNISolone sodium succinate, 20 mg, Intravenous, Q24H  metoprolol tartrate, 25 mg, Oral, Q12H  sodium chloride, 10 mL, Intravenous, Q12H  sodium chloride, 10 mL, Intravenous, Q12H  thiamine, 100 mg, Intramuscular, Daily    DOBUTamine, 2-20 mcg/kg/min, Last Rate: 2.5 mcg/kg/min (21 0350)  heparin (porcine), 7.87 Units/kg/hr, Last Rate: 10.87 Units/kg/hr (21 0733)  norepinephrine, 0.02-0.3 mcg/kg/min, Last Rate: Stopped (21 1643)  Pharmacy Consult,         Current Antimicrobial Therapy:  Anti-Infectives (From admission, onward)    Ordered     Dose/Rate Route Frequency Start Stop    21 0319  meropenem (MERREM) 1 g in sodium chloride 0.9 % 100 mL IVPB-VTB        Ordering Provider: Gabriele Bo MD    1 g  over 30 Minutes Intravenous Once 21 0430 21 0435    21 1539  piperacillin-tazobactam (ZOSYN) IVPB 3.375 g in 100 mL NS VTB        Ordering Provider: Natanael Toscano MD    3.375 g  over 30 Minutes Intravenous Once  12/09/21 1700 12/09/21 1637    12/09/21 0425  piperacillin-tazobactam (ZOSYN) IVPB 4.5 g in 100 mL NS VTB        Ordering Provider: Francisco Naranjo MD    4.5 g  over 30 Minutes Intravenous Once 12/09/21 0427 12/09/21 0556    12/09/21 0329  cefTRIAXone (ROCEPHIN) 2 g in sodium chloride 0.9 % 100 mL IVPB-VTB        Ordering Provider: Francisco Naranjo MD    2 g  200 mL/hr over 30 Minutes Intravenous Once 12/09/21 0331 12/09/21 0444        Current Diuretic Therapy:  Diuretics (From admission, onward)    Ordered     Dose/Rate Route Frequency Start Stop    12/14/21 1000  furosemide (LASIX) injection 20 mg        Ordering Provider: Kirstin Esparza MD    20 mg Intravenous Once 12/14/21 1100 12/14/21 1132    12/14/21 1000  furosemide (LASIX) 100 mg in sodium chloride 0.9 % 50 mL IVPB        Ordering Provider: Kirstin Esparza MD    100 mg  6.5 mL/hr over 10 Hours Intravenous Once 12/14/21 1100          ----------------------------------------------------------------------------------------------------------------------  Vital Signs:  Temp:  [97.3 °F (36.3 °C)-97.7 °F (36.5 °C)] 97.7 °F (36.5 °C)  Heart Rate:  [] 101  Resp:  [13-20] 13  BP: ()/() 125/93  SpO2:  [88 %-100 %] 98 %  on  Flow (L/min):  [2] 2;   Device (Oxygen Therapy): nasal cannula  Body mass index is 45.89 kg/m².    Wt Readings from Last 3 Encounters:   12/12/21 (!) 137 kg (301 lb 13 oz)   12/03/20 126 kg (276 lb 12.8 oz)   01/01/19 118 kg (260 lb)     Intake & Output (last 3 days)       12/11 0701  12/12 0700 12/12 0701 12/13 0700 12/13 0701 12/14 0700 12/14 0701  12/15 0700    P.O. 236 120 8 13    I.V. (mL/kg) 1853 (14) 1341.2 (9.8) 329.5 (2.4) 558 (4.1)    IV Piggyback  50 250 400    Total Intake(mL/kg) 2089 (15.8) 1511.2 (11) 587.5 (4.3) 971 (7.1)    Urine (mL/kg/hr) 558 (0.2) 450 (0.1) 1050 (0.3) 675 (0.4)    Stool  0 0     Total Output  675    Net +1531 +1061.2 -462.5 +296            Urine Unmeasured  Occurrence 1 x 1 x      Stool Unmeasured Occurrence  2 x 3 x         Diet Clear Liquid  ----------------------------------------------------------------------------------------------------------------------  Physical exam:  Constitutional: Morbidly obese adult female resting in bed, NAD  HENT:  Head:  Normocephalic and atraumatic.  Mouth:  Moist mucous membranes.    Eyes:  Conjunctivae and EOM are normal. No scleral icterus.     Cardiovascular: Irregularly irregular with no murmur, rub, or gallop  Pulmonary/Chest: Decreased breath sounds throughout with poor air movement.  Abdominal:  Soft.  Bowel sounds are normal.  No distension and no tenderness.   Musculoskeletal:  No edema, no tenderness, and no deformity.    Neurological: Somnolent but arousable and interactive.  No focal neurological deficits on limited exam  Skin:  Skin is warm and dry. No rash or lesion noted. No pallor.   Peripheral vascular:  Pulses in all 4 extremities with no clubbing, no cyanosis.    ----------------------------------------------------------------------------------------------------------------------  Tele:    ----------------------------------------------------------------------------------------------------------------------  Results from last 7 days   Lab Units 12/14/21  0401 12/13/21  0356 12/12/21  0057 12/11/21  0444 12/10/21  2251 12/10/21  2251 12/10/21  0044 12/09/21 2026 12/09/21  1110 12/09/21  0426   CRP mg/dL  --  16.36* 22.87*  --   --  27.13*   < >  --   --   --    LACTATE mmol/L  --   --  3.9*  --   --   --   --  3.6*  --  3.6*   WBC 10*3/mm3 27.78* 26.58* 27.26*  --    < > 31.70*   < >  --   --   --    HEMOGLOBIN g/dL 9.8* 10.0* 10.5*  --    < > 9.8*   < >  --   --   --    HEMATOCRIT % 31.1* 32.1* 34.7  --    < > 33.7*   < >  --   --   --    MCV fL 77.0* 78.1* 81.6  --    < > 84.0   < >  --   --   --    MCHC g/dL 31.5 31.2* 30.3*  --    < > 29.1*   < >  --   --   --    PLATELETS 10*3/mm3 101* 134* 198  --    < > 256    < >  --   --   --    INR  2.25*  --  3.62* 3.21*  --   --   --   --  1.66* 1.42*    < > = values in this interval not displayed.     Results from last 7 days   Lab Units 12/13/21  0431   PH, ARTERIAL pH units 7.395   PO2 ART mm Hg 94.3   PCO2, ARTERIAL mm Hg 42.0   HCO3 ART mmol/L 25.7     Results from last 7 days   Lab Units 12/14/21  0401 12/13/21  1216 12/13/21  0356 12/12/21  2139 12/12/21  1736 12/11/21  0124 12/10/21  2251 12/10/21  0044   SODIUM mmol/L 132* 130* 131*   < > 127*   < >  --  131*   POTASSIUM mmol/L 5.1 5.3* 5.6*   < > 5.3*   < >  --  4.4   MAGNESIUM mg/dL  --   --   --   --   --   --  2.5 2.1   CHLORIDE mmol/L 98 97* 97*   < > 95*   < >  --  98   CO2 mmol/L 19.1* 21.8* 22.0   < > 19.3*   < >  --  16.5*   BUN mg/dL 95* 90* 82*   < > 71*   < >  --  23*   CREATININE mg/dL 3.12* 3.27* 3.18*   < > 2.99*   < >  --  1.05*   EGFR IF NONAFRICN AM mL/min/1.73 16* 15* 16*   < > 17*   < >  --  57*   CALCIUM mg/dL 7.4* 7.3* 7.4*   < > 7.5*   < >  --  8.4*   GLUCOSE mg/dL 150* 171* 155*   < > 165*   < >  --  198*   ALBUMIN g/dL 2.49*  --  2.76*  --  2.63*   < >  --  2.80*   BILIRUBIN mg/dL 4.7*  --  5.5*  --  5.5*   < >  --  3.1*   ALK PHOS U/L 182*  --  192*  --  186*   < >  --  180*   AST (SGOT) U/L 661*  --  1,567*  --  2,069*   < >  --  242*   ALT (SGPT) U/L 1,101*  --  1,456*  --  1,577*   < >  --  222*    < > = values in this interval not displayed.   Estimated Creatinine Clearance: 33.5 mL/min (A) (by C-G formula based on SCr of 3.12 mg/dL (H)).  Ammonia   Date Value Ref Range Status   12/11/2021 103 (H) 11 - 51 umol/L Final     Results from last 7 days   Lab Units 12/13/21  0356 12/12/21  0057 12/11/21  0445 12/11/21  0124 12/10/21  2251 12/10/21  0303   CK TOTAL U/L 859* 1,495* 2,338*  --    < >  --    TROPONIN T ng/mL  --   --  0.019 0.015  --  <0.010    < > = values in this interval not displayed.     Results from last 7 days   Lab Units 12/09/21  0233   PROBNP pg/mL 4,147.0*         Glucose    Date/Time Value Ref Range Status   12/14/2021 1741 156 (H) 70 - 130 mg/dL Final     Comment:     Meter: [id number] : [id number] JAYLA [place]   12/14/2021 1145 166 (H) 70 - 130 mg/dL Final     Comment:     Meter: [id number] : [id number] JAYLA [place]   12/14/2021 0613 149 (H) 70 - 130 mg/dL Final     Comment:     Meter: [id number] : [id number] Sacha Bird   12/13/2021 2351 173 (H) 70 - 130 mg/dL Final     Comment:     Meter: [id number] : [id number] Sacha Bird   12/13/2021 1729 155 (H) 70 - 130 mg/dL Final     Comment:     Meter: [id number] : [id number] JAYLA [place]   12/13/2021 1239 179 (H) 70 - 130 mg/dL Final     Comment:     Meter: [id number] : [id number] JAYLA [place]   12/13/2021 0619 191 (H) 70 - 130 mg/dL Final     Comment:     Meter: [id number] : [id number] APARNAHUY LAY   12/12/2021 2347 162 (H) 70 - 130 mg/dL Final     Comment:     Meter: [id number] : [id number] Goncalves Marge     Lab Results   Component Value Date    TSH 4.040 12/10/2021    FREET4 0.81 (L) 12/10/2021     Lab Results   Component Value Date    PREGTESTUR Negative 12/03/2020     Pain Management Panel     Pain Management Panel Latest Ref Rng & Units 12/11/2021 12/11/2021    CREATININE UR mg/dL 108.5 108.5    AMPHETAMINES SCREEN, URINE Negative - -    BARBITURATES SCREEN Negative - -    BENZODIAZEPINE SCREEN, URINE Negative - -    BUPRENORPHINEUR Negative - -    COCAINE SCREEN, URINE Negative - -    METHADONE SCREEN, URINE Negative - -    METHAMPHETAMINEUR Negative - -        Brief Urine Lab Results  (Last result in the past 365 days)      Color   Clarity   Blood   Leuk Est   Nitrite   Protein   CREAT   Urine HCG        12/11/21 1328             108.5         12/11/21 1328             108.5             Blood Culture   Date Value Ref Range Status   12/09/2021 No growth at 4 days  Preliminary   12/09/2021 No growth at 4 days  Preliminary     No results found for: URINECX  No results  found for: WOUNDCX  No results found for: STOOLCX  No results found for: RESPCX  No results found for: AFBCX  Results from last 7 days   Lab Units 12/13/21  0356 12/12/21  0057 12/10/21  2251 12/10/21  0044 12/09/21 2026 12/09/21  0426 12/09/21  0233   LACTATE mmol/L  --  3.9*  --   --  3.6* 3.6* 2.9*   CRP mg/dL 16.36* 22.87* 27.13* 30.87*  --   --  26.44*       I have personally looked at the labs and they are summarized above.  ----------------------------------------------------------------------------------------------------------------------  Detailed radiology reports for the last 24 hours:    Imaging Results (Last 24 Hours)     ** No results found for the last 24 hours. **        Assessment & Plan      Cardiogenic shock  Acute hypoxemic hypercarbic respiratory failure  Acute pulmonary embolism  Lactic acidemia    -Continue heparin GTT for anticoagulation    -Levophed titrated off, currently only on dobutamine to maintain MAP greater than 65 for inotropic support    -Patient has completed a 72-hour course of meropenem and doxycycline in the setting of shock, however initially patient meeting requirements for sepsis however no infectious etiology found at this time and patient source of shock far more likely to be cardiogenic in origin given her significantly reduced EF and improvement with dobutamine.  Therefore a 72-hour course of antibiotics in the setting of shock is more than adequate and will be discontinued at this time as there is no further indication for antibiotic therapy at this time.    -Continue supplemental oxygen to maintain O2 saturation 92% or greater    -Cardiology consulted and following along, appreciate input and help unable to initiate guideline directed medical therapy at this time due to hypotension and renal failure.  However cardiology did arrange for and have patient accepted to heart failure/heart transplant unit at [facility] pending bed availability.    -TTE shows EF less  than 20% with severely decreased LV systolic function with moderately dilated left ventricular cavity.  No pericardial effusion.    -She remains with clinically volume overloaded status, would benefit from diuretics and nephrology has initiated patient on Lasix drip.    Acute kidney injury  Rhabdomyolysis  Hyperkalemia    -Suspected secondary to ATN and possible contrast-induced nephropathy.  Creatinine still slowly trending upward at this time.    -Nephrology consulted and following along, appreciate input and help.  Is in agreement regarding dobutamine.    -CK improving, continue supportive care    -Insulin D50 with Lokelma    Cirrhosis  Shock Liver, improving  Metabolic encephalopathy    -Continue lactulose, 30 g 3 times daily    -Continue heparin for atrial fibrillation and acute PE given prothrombotic nature of patient's cirrhosis with elevated INR    -liver enzymes trending favorably, INR decreasing    Polysubstance abuse    -Patient positive for methamphetamines at admission    -Complicates all aspects of care    VTE Prophylaxis:   Mechanical Order History:     None      Pharmalogical Order History:      Ordered     Dose Route Frequency Stop    12/09/21 0425  heparin (porcine) 5000 UNIT/ML injection 5,000 Units         5,000 Units IV Once 12/09/21 0449    12/09/21 0425  heparin 17901 units/250 mL (100 units/mL) in 0.45 % NaCl infusion  9.99 mL/hr         7.87 Units/kg/hr IV Titrated --    12/09/21 0425  heparin (porcine) 5000 UNIT/ML injection 5,000 Units         5,000 Units IV As Needed --    12/09/21 0425  heparin (porcine) 5000 UNIT/ML injection 2,500 Units         2,500 Units IV As Needed --                Disposition pending bed at [facility] with heart failure/heart transplant service.    Isael Barclay DO  [facility] Hospitalist  12/14/21  18:16 EST

## 2021-12-14 NOTE — PLAN OF CARE
Goal Outcome Evaluation:  Plan of Care Reviewed With: patient        Progress: improving  Outcome Summary: VSS. Flutter on monitor. Pt remains on 2L throughout the day. Pt recieved albumin, lasix push and a lasix drip during this shift. Pt continues to recieve heparin IV. Pt alert to self and place but still drowsy.  called for updated during shift, still waiting for placement. [facility]

## 2021-12-15 NOTE — PLAN OF CARE
Goal Outcome Evaluation:           Progress: no change  Outcome Summary: Patient had episode of chest pain overnight, no EKG changes noted. PTT over 100 and drop in platelets noted this am, Heparin drip stopped, awaiting AICU call back.

## 2021-12-15 NOTE — PROGRESS NOTES
Nephrology Progress Note      Subjective     Pt is awake but non communicative and confused. Apparently more comfortable respiratory wise.     Objective       Vital signs :     Temp:  [97.3 °F (36.3 °C)-97.7 °F (36.5 °C)] 97.6 °F (36.4 °C)  Heart Rate:  [] 138  Resp:  [14-19] 19  BP: ()/() 103/77      Intake/Output Summary (Last 24 hours) at 12/15/2021 0752  Last data filed at 12/15/2021 0600  Gross per 24 hour   Intake 1282.06 ml   Output 1175 ml   Net 107.06 ml       Physical Exam:    General Appearance : not in acute distress  Lungs : B/L lower zone crackles  Heart :  regular rhythm & normal rate, normal S1, S2 and no murmur, no rub  Abdomen : normal bowel sounds, no masses, no hepatomegaly, no splenomegaly, soft non-tender and no guarding  Extremities : trace edema, no cyanosis and no redness  Neurologic :   Unable to assess.     Laboratory Data :     Albumin Albumin   Date Value Ref Range Status   12/15/2021 3.17 (L) 3.50 - 5.20 g/dL Final   12/15/2021 3.17 (L) 3.50 - 5.20 g/dL Final   12/14/2021 3.10 (L) 3.50 - 5.20 g/dL Final   12/14/2021 2.49 (L) 3.50 - 5.20 g/dL Final   12/13/2021 2.76 (L) 3.50 - 5.20 g/dL Final   12/12/2021 2.63 (L) 3.50 - 5.20 g/dL Final   12/12/2021 2.77 (L) 3.50 - 5.20 g/dL Final      Magnesium No results found for: MG       PTH               No results found for: PTH    CBC and coagulation:  Results from last 7 days   Lab Units 12/15/21  0534 12/14/21  0401 12/13/21  0356 12/12/21  0057 12/12/21  0057 12/11/21  0444 12/10/21  2251 12/10/21  0044 12/09/21  2026 12/09/21  1110 12/09/21  0426   LACTATE mmol/L  --   --   --   --  3.9*  --   --   --  3.6*  --  3.6*   CRP mg/dL  --   --  16.36*  --  22.87*  --  27.13*   < >  --   --   --    WBC 10*3/mm3 26.64* 27.78* 26.58*   < > 27.26*  --  31.70*   < >  --   --   --    HEMOGLOBIN g/dL 9.9* 9.8* 10.0*   < > 10.5*  --  9.8*   < >  --   --   --    HEMATOCRIT % 31.5* 31.1* 32.1*   < > 34.7  --  33.7*   < >  --   --   --     MCV fL 75.9* 77.0* 78.1*   < > 81.6  --  84.0   < >  --   --   --    MCHC g/dL 31.4* 31.5 31.2*   < > 30.3*  --  29.1*   < >  --   --   --    PLATELETS 10*3/mm3 24* 101* 134*   < > 198  --  256   < >  --   --   --    INR  2.68* 2.25*  --   --  3.62*   < >  --   --   --    < > 1.42*    < > = values in this interval not displayed.     Acid/base balance:  Results from last 7 days   Lab Units 12/13/21  0431 12/11/21  1944 12/11/21  1138   PH, ARTERIAL pH units 7.395 7.323* 7.331*   PO2 ART mm Hg 94.3 74.6* 79.0*   PCO2, ARTERIAL mm Hg 42.0 43.8 37.3   HCO3 ART mmol/L 25.7 22.7 19.7*     Renal and electrolytes:  Results from last 7 days   Lab Units 12/15/21  0534 12/14/21 2015 12/14/21  0401 12/13/21  1216 12/13/21  1216 12/13/21  0356 12/13/21  0356 12/11/21  0124 12/10/21  2251 12/10/21  0044   SODIUM mmol/L 134*  134* 133* 132*  --  130*  --  131*   < >  --  131*   POTASSIUM mmol/L 5.2  5.2 4.9 5.1   < > 5.3*   < > 5.6*   < >  --  4.4   MAGNESIUM mg/dL  --   --   --   --   --   --   --   --  2.5 2.1   CHLORIDE mmol/L 98  98 98 98   < > 97*   < > 97*   < >  --  98   CO2 mmol/L 22.2  22.2 21.4* 19.1*   < > 21.8*   < > 22.0   < >  --  16.5*   BUN mg/dL 111*  111* 103* 95*   < > 90*   < > 82*   < >  --  23*   CREATININE mg/dL 3.35*  3.35* 3.20* 3.12*  --  3.27*  --  3.18*   < >  --  1.05*   EGFR IF NONAFRICN AM mL/min/1.73 15*  15* 16* 16*   < > 15*   < > 16*   < >  --  57*   CALCIUM mg/dL 8.4*  8.4* 8.1* 7.4*   < > 7.3*   < > 7.4*   < >  --  8.4*   PHOSPHORUS mg/dL 6.5* 6.1*  --   --   --   --   --   --   --   --     < > = values in this interval not displayed.     Estimated Creatinine Clearance: 31 mL/min (A) (by C-G formula based on SCr of 3.35 mg/dL (H)).    Liver and pancreatic function:  Results from last 7 days   Lab Units 12/15/21  0534 12/14/21 2015 12/14/21  0401 12/13/21  0356 12/13/21  0356 12/12/21  0057 12/11/21  1903 12/11/21  1008 12/11/21  0445   ALBUMIN g/dL 3.17*  3.17* 3.10* 2.49*   < >  2.76*   < >  --    < >  --    BILIRUBIN mg/dL 6.3*  --  4.7*  --  5.5*   < >  --    < >  --    ALK PHOS U/L 185*  --  182*  --  192*   < >  --    < >  --    AST (SGOT) U/L 337*  --  661*  --  1,567*   < >  --    < >  --    ALT (SGPT) U/L 775*  --  1,101*  --  1,456*   < >  --    < >  --    AMMONIA umol/L  --   --   --   --   --   --  103*  --   --    AMYLASE U/L  --   --   --   --  226*  --   --   --   --    LIPASE U/L  --   --   --   --  225*  --   --   --  540*    < > = values in this interval not displayed.         Cardiac:  Results from last 7 days   Lab Units 12/09/21  0233   PROBNP pg/mL 4,147.0*     Liver and pancreatic function:  Results from last 7 days   Lab Units 12/15/21  0534 12/14/21 2015 12/14/21  0401 12/13/21  0356 12/13/21  0356 12/12/21  0057 12/11/21  1903 12/11/21  1008 12/11/21  0445   ALBUMIN g/dL 3.17*  3.17* 3.10* 2.49*   < > 2.76*   < >  --    < >  --    BILIRUBIN mg/dL 6.3*  --  4.7*  --  5.5*   < >  --    < >  --    ALK PHOS U/L 185*  --  182*  --  192*   < >  --    < >  --    AST (SGOT) U/L 337*  --  661*  --  1,567*   < >  --    < >  --    ALT (SGPT) U/L 775*  --  1,101*  --  1,456*   < >  --    < >  --    AMMONIA umol/L  --   --   --   --   --   --  103*  --   --    AMYLASE U/L  --   --   --   --  226*  --   --   --   --    LIPASE U/L  --   --   --   --  225*  --   --   --  540*    < > = values in this interval not displayed.       Medications :     budesonide-formoterol, 2 puff, Inhalation, BID - RT  lactulose, 30 g, Oral, TID  methylPREDNISolone sodium succinate, 20 mg, Intravenous, Q24H  pantoprazole, 40 mg, Oral, Q AM  sodium chloride, 10 mL, Intravenous, Q12H  sodium chloride, 10 mL, Intravenous, Q12H  thiamine, 100 mg, Intramuscular, Daily      DOBUTamine, 2-20 mcg/kg/min, Last Rate: 2.5 mcg/kg/min (12/14/21 0350)  heparin (porcine), 7.87 Units/kg/hr, Last Rate: Stopped (12/15/21 0630)  norepinephrine, 0.02-0.3 mcg/kg/min, Last Rate: Stopped (12/12/21 1643)  Pharmacy Consult,          Assessment/Plan     1.  Acute renal failure with acute tubular necrosis with oliguria  2.  Hypoxic respiratory failure  3.  Cardiogenic shock  4.  Shock liver  5.  Pulmonary embolism  6.  Pneumonia with sepsis  7.  Left ventricular ejection fraction 20%  8.  Atrial fibrillation now with controlled ventricular rate  9.  Hyperkalemia  10. Metabolic encephalopathy  11. Hyponatremia likely hypervolumia    Cr continue to get worse, expected with ATN.  CXR is better and respiratory status is also improving. Will hold on diuretics, and continue on IV albumin now.   Metabolic acidosis and hyperkalemia is better.   Worsening thrombocytopenia with well stable HH likely due to DIC, nevertheless, will go ahead and get hemolysis scren and peripheral blood smear because sometime smoldering TMA will result in kidney damage without significant anemia  Agree on switching to levo.   Pt's condition is critical overall and high risk to require dialysis.       Kirstin Esparza MD  12/15/21  07:52 EST

## 2021-12-15 NOTE — PROGRESS NOTES
[facility] HOSPITALIST PROGRESS NOTE     Patient Identification:  Name:  Tiera Mendoza  Age:  46 y.o.  Sex:  female  :  1975  MRN:  [id number]  Visit Number:  [id number]  ROOM: [address]     Primary Care Provider:  Alan Martin APRN    Length of stay in inpatient status:  6    Subjective     Chief Compliant:    Chief Complaint   Patient presents with   • Shortness of Breath       History of Presenting Illness: Patient seen and evaluated in follow-up for acute hypoxemic hypercarbic respiratory failure secondary to sepsis with right-sided pneumonia and pulmonary embolism and acute severe cardiomyopathy with cardiogenic shock.  Overnight patient noted on lab work to have significant development of thrombocytopenia and heparin drip drop by telemetry intensivist.  Patient with no clinical signs or symptoms of bleeding at this time.  Patient remains minimally interactive and uncooperative with exam and appears to have very poor insight into her current medical situation.    Objective     Current Hospital Meds:albumin human, 25 g, Intravenous, TID  budesonide-formoterol, 2 puff, Inhalation, BID - RT  lactulose, 30 g, Oral, TID  methylPREDNISolone sodium succinate, 20 mg, Intravenous, Q24H  pantoprazole, 40 mg, Oral, Q AM  sodium chloride, 10 mL, Intravenous, Q12H  sodium chloride, 10 mL, Intravenous, Q12H    DOBUTamine, 2-20 mcg/kg/min, Last Rate: Stopped (12/15/21 1246)  heparin (porcine), 7.87 Units/kg/hr, Last Rate: Stopped (12/15/21 0630)  norepinephrine, 0.02-0.3 mcg/kg/min, Last Rate: Stopped (21 1643)  Pharmacy Consult,         Current Antimicrobial Therapy:  Anti-Infectives (From admission, onward)    Ordered     Dose/Rate Route Frequency Start Stop    21 0319  meropenem (MERREM) 1 g in sodium chloride 0.9 % 100 mL IVPB-VTB        Ordering Provider: Gabriele Bo MD    1 g  over 30 Minutes Intravenous Once 21 0430 21 0435    21 0620   piperacillin-tazobactam (ZOSYN) IVPB 3.375 g in 100 mL NS VTB        Ordering Provider: Natanael Toscano MD    3.375 g  over 30 Minutes Intravenous Once 12/09/21 1700 12/09/21 1637    12/09/21 0425  piperacillin-tazobactam (ZOSYN) IVPB 4.5 g in 100 mL NS VTB        Ordering Provider: Francisco Naranjo MD    4.5 g  over 30 Minutes Intravenous Once 12/09/21 0427 12/09/21 0556    12/09/21 0329  cefTRIAXone (ROCEPHIN) 2 g in sodium chloride 0.9 % 100 mL IVPB-VTB        Ordering Provider: Francisco Naranjo MD    2 g  200 mL/hr over 30 Minutes Intravenous Once 12/09/21 0331 12/09/21 0444        Current Diuretic Therapy:  Diuretics (From admission, onward)    Ordered     Dose/Rate Route Frequency Start Stop    12/14/21 1000  furosemide (LASIX) injection 20 mg        Ordering Provider: Kirstin Esparza MD    20 mg Intravenous Once 12/14/21 1100 12/14/21 1132    12/14/21 1000  furosemide (LASIX) 100 mg in sodium chloride 0.9 % 50 mL IVPB        Ordering Provider: Kirstin Esparza MD    100 mg  6.5 mL/hr over 10 Hours Intravenous Once 12/14/21 1100 12/14/21 2135        ----------------------------------------------------------------------------------------------------------------------  Vital Signs:  Temp:  [97.4 °F (36.3 °C)-98.3 °F (36.8 °C)] 98.3 °F (36.8 °C)  Heart Rate:  [] 110  Resp:  [14-24] 21  BP: ()/() 117/91  SpO2:  [88 %-100 %] 94 %  on  Flow (L/min):  [2] 2;   Device (Oxygen Therapy): nasal cannula  Body mass index is 45.43 kg/m².    Wt Readings from Last 3 Encounters:   12/15/21 136 kg (298 lb 12.8 oz)   12/03/20 126 kg (276 lb 12.8 oz)   01/01/19 118 kg (260 lb)     Intake & Output (last 3 days)       12/12 0701 12/13 0700 12/13 0701 12/14 0700 12/14 0701  12/15 0700 12/15 0701  12/16 0700    P.O. 120 8 13 80    I.V. (mL/kg) 1341.2 (9.8) 329.5 (2.4) 869.1 (6.4) 68.6 (0.5)    Blood    225    IV Piggyback 50 250 400 100    Total Intake(mL/kg) 1511.2 (11) 587.5 (4.3) 1282.1 (9.4)  473.6 (3.5)    Urine (mL/kg/hr) 450 (0.1) 1050 (0.3) 1175 (0.4) 400 (0.3)    Stool 0 0 0 0    Total Output 450 1050 1175 400    Net +1061.2 -462.5 +107.1 +73.6            Urine Unmeasured Occurrence 1 x       Stool Unmeasured Occurrence 2 x 3 x 1 x 1 x        Diet Clear Liquid  ----------------------------------------------------------------------------------------------------------------------  Physical exam:  Constitutional: Morbidly obese adult female resting in bed, NAD  HENT:  Head:  Normocephalic and atraumatic.  Mouth:  Moist mucous membranes.    Eyes:  Conjunctivae and EOM are normal. No scleral icterus.     Cardiovascular: Irregularly irregular with no murmur, rub, or gallop  Pulmonary/Chest: Decreased breath sounds throughout with poor air movement.  Abdominal:  Soft.  Bowel sounds are normal.  No distension and no tenderness.   Musculoskeletal:  No edema, no tenderness, and no deformity.    Neurological: Somnolent but arousable and interactive.  No focal neurological deficits on limited exam  Skin:  Skin is warm and dry. No rash or lesion noted. No pallor.   Peripheral vascular:  Pulses in all 4 extremities with no clubbing, no cyanosis.    ----------------------------------------------------------------------------------------------------------------------  Tele:    ----------------------------------------------------------------------------------------------------------------------  Results from last 7 days   Lab Units 12/15/21  1145 12/15/21  0534 12/14/21  0401 12/13/21  0356 12/13/21  0356 12/12/21  0057 12/12/21  0057 12/11/21  0444 12/10/21  2251 12/10/21  2251 12/10/21  0044 12/09/21  2026 12/09/21  1110 12/09/21  0426   CRP mg/dL  --   --   --   --  16.36*  --  22.87*  --   --  27.13*   < >  --   --   --    LACTATE mmol/L  --   --   --   --   --   --  3.9*  --   --   --   --  3.6*  --  3.6*   WBC 10*3/mm3 26.77* 26.64* 27.78*   < > 26.58*   < > 27.26*  --    < > 31.70*   < >  --   --   --     HEMOGLOBIN g/dL 9.8* 9.9* 9.8*   < > 10.0*   < > 10.5*  --    < > 9.8*   < >  --   --   --    HEMATOCRIT % 31.1* 31.5* 31.1*   < > 32.1*   < > 34.7  --    < > 33.7*   < >  --   --   --    MCV fL 76.8* 75.9* 77.0*   < > 78.1*   < > 81.6  --    < > 84.0   < >  --   --   --    MCHC g/dL 31.5 31.4* 31.5   < > 31.2*   < > 30.3*  --    < > 29.1*   < >  --   --   --    PLATELETS 10*3/mm3 18* 24* 101*   < > 134*   < > 198  --    < > 256   < >  --   --   --    INR  3.47* 2.68* 2.25*  --   --   --  3.62* 3.21*  --   --   --   --  1.66* 1.42*    < > = values in this interval not displayed.     Results from last 7 days   Lab Units 12/13/21  0431   PH, ARTERIAL pH units 7.395   PO2 ART mm Hg 94.3   PCO2, ARTERIAL mm Hg 42.0   HCO3 ART mmol/L 25.7     Results from last 7 days   Lab Units 12/15/21  0534 12/14/21 2015 12/14/21  0401 12/13/21  1216 12/13/21  0356 12/11/21  0124 12/10/21  2251 12/10/21  0044   SODIUM mmol/L 134*  134* 133* 132*   < > 131*   < >  --  131*   POTASSIUM mmol/L 5.2  5.2 4.9 5.1   < > 5.6*   < >  --  4.4   MAGNESIUM mg/dL 3.1*  --   --   --   --   --  2.5 2.1   CHLORIDE mmol/L 98  98 98 98   < > 97*   < >  --  98   CO2 mmol/L 22.2  22.2 21.4* 19.1*   < > 22.0   < >  --  16.5*   BUN mg/dL 111*  111* 103* 95*   < > 82*   < >  --  23*   CREATININE mg/dL 3.35*  3.35* 3.20* 3.12*   < > 3.18*   < >  --  1.05*   EGFR IF NONAFRICN AM mL/min/1.73 15*  15* 16* 16*   < > 16*   < >  --  57*   CALCIUM mg/dL 8.4*  8.4* 8.1* 7.4*   < > 7.4*   < >  --  8.4*   PHOSPHORUS mg/dL 6.5* 6.1*  --   --   --   --   --   --    GLUCOSE mg/dL 146*  146* 155* 150*   < > 155*   < >  --  198*   ALBUMIN g/dL 3.17*  3.17* 3.10* 2.49*  --  2.76*   < >  --  2.80*   BILIRUBIN mg/dL 6.3*  --  4.7*  --  5.5*   < >  --  3.1*   ALK PHOS U/L 185*  --  182*  --  192*   < >  --  180*   AST (SGOT) U/L 337*  --  661*  --  1,567*   < >  --  242*   ALT (SGPT) U/L 775*  --  1,101*  --  1,456*   < >  --  222*    < > = values in this interval  not displayed.   Estimated Creatinine Clearance: 31 mL/min (A) (by C-G formula based on SCr of 3.35 mg/dL (H)).  No results found for: AMMONIA  Results from last 7 days   Lab Units 12/13/21  0356 12/12/21  0057 12/11/21  0445 12/11/21  0124 12/10/21  2251 12/10/21  0303   CK TOTAL U/L 859* 1,495* 2,338*  --    < >  --    TROPONIN T ng/mL  --   --  0.019 0.015  --  <0.010    < > = values in this interval not displayed.     Results from last 7 days   Lab Units 12/09/21  0233   PROBNP pg/mL 4,147.0*         Glucose   Date/Time Value Ref Range Status   12/15/2021 1705 138 (H) 70 - 130 mg/dL Final     Comment:     Meter: [id number] : [id number] Frances Margi   12/15/2021 1336 232 (H) 70 - 130 mg/dL Final     Comment:     Meter: [id number] : [id number] Frances Margi   12/15/2021 0559 135 (H) 70 - 130 mg/dL Final     Comment:     Meter: [id number] : [id number] Noah Walsh   12/14/2021 2358 175 (H) 70 - 130 mg/dL Final     Comment:     Meter: [id number] : [id number] Noah Coleanda   12/14/2021 1741 156 (H) 70 - 130 mg/dL Final     Comment:     Meter: [id number] : [id number] JAYLA ALVAREZ   12/14/2021 1145 166 (H) 70 - 130 mg/dL Final     Comment:     Meter: [id number] : [id number] JAYLA ALVAREZ   12/14/2021 0613 149 (H) 70 - 130 mg/dL Final     Comment:     Meter: [id number] : [id number] Sacha Bird   12/13/2021 2351 173 (H) 70 - 130 mg/dL Final     Comment:     Meter: [id number] : [id number] Sacha Bird     Lab Results   Component Value Date    TSH 4.040 12/10/2021    FREET4 0.81 (L) 12/10/2021     Lab Results   Component Value Date    PREGTESTUR Negative 12/03/2020     Pain Management Panel     Pain Management Panel Latest Ref Rng & Units 12/11/2021 12/11/2021    CREATININE UR mg/dL 108.5 108.5    AMPHETAMINES SCREEN, URINE Negative - -    BARBITURATES SCREEN Negative - -    BENZODIAZEPINE SCREEN, URINE Negative - -    BUPRENORPHINEUR Negative - -    COCAINE SCREEN, URINE  Negative - -    METHADONE SCREEN, URINE Negative - -    METHAMPHETAMINEUR Negative - -        Brief Urine Lab Results  (Last result in the past 365 days)      Color   Clarity   Blood   Leuk Est   Nitrite   Protein   CREAT   Urine HCG        12/11/21 1328             108.5         12/11/21 1328             108.5             Blood Culture   Date Value Ref Range Status   12/09/2021 No growth at 4 days  Preliminary   12/09/2021 No growth at 4 days  Preliminary     No results found for: URINECX  No results found for: WOUNDCX  No results found for: STOOLCX  No results found for: RESPCX  No results found for: AFBCX  Results from last 7 days   Lab Units 12/13/21  0356 12/12/21  0057 12/10/21  2251 12/10/21  0044 12/09/21 2026 12/09/21  0426 12/09/21  0233   LACTATE mmol/L  --  3.9*  --   --  3.6* 3.6* 2.9*   CRP mg/dL 16.36* 22.87* 27.13* 30.87*  --   --  26.44*       I have personally looked at the labs and they are summarized above.  ----------------------------------------------------------------------------------------------------------------------  Detailed radiology reports for the last 24 hours:    Imaging Results (Last 24 Hours)     Procedure Component Value Units Date/Time    XR Chest 1 View [[id number]] Collected: 12/15/21 1231     Updated: 12/15/21 1233    Narrative:      EXAM:    XR Chest, 1 View     EXAM DATE:    12/15/2021 10:57 AM     CLINICAL HISTORY:    follow up pneumonia; I26.93-Single subsegmental pulmonary embolism  without acute cor pulmonale; J18.9-Pneumonia, unspecified organism;  A41.9-Sepsis, unspecified organism     TECHNIQUE:    Frontal view of the chest.     COMPARISON:    12/12/2021     FINDINGS:    LUNGS:  Improved aeration of the lung bases with persistent mild  bibasilar airspace opacity.    PLEURAL SPACE:  Probably tiny bilateral pleural effusions.  No  pneumothorax.    HEART:  Mild cardiomegaly again noted.    MEDIASTINUM:  Unremarkable.    BONES/JOINTS:  Unremarkable.    TUBES, LINES AND  DEVICES:  Right central line with tip in SVC again  noted.       Impression:      1.  Improved aeration of the lung bases with persistent mild bibasilar  airspace opacity.  2.  Probably tiny bilateral pleural effusions.     This report was finalized on 12/15/2021 12:31 PM by Dr. Kalpesh Benavides MD.           Assessment & Plan      Cardiogenic shock  Acute hypoxemic hypercarbic respiratory failure  Acute pulmonary embolism  Lactic acidemia    -Heparin GTT held secondary to thrombocytopenia and DIC    -Discussed with cardiology and pulmonology and transitioning from dobutamine to Levophed for vasopressor support to avoid MAP less than 65.    -Patient has completed a 72-hour course of meropenem and doxycycline in the setting of shock, however initially patient meeting requirements for sepsis however no infectious etiology found at this time and patient source of shock far more likely to be cardiogenic in origin given her significantly reduced EF and improvement with dobutamine.  Therefore a 72-hour course of antibiotics in the setting of shock is more than adequate and will be discontinued at this time as there is no further indication for antibiotic therapy at this time.    -Weaned off of oxygen, suspect low pulmonary embolism burden.    -Cardiology consulted and following along, appreciate input and help unable to initiate guideline directed medical therapy at this time due to hypotension and renal failure.  However cardiology did arrange for and have patient accepted to heart failure/heart transplant unit at [facility] pending bed availability.    -TTE shows EF less than 20% with severely decreased LV systolic function with moderately dilated left ventricular cavity.  No pericardial effusion.    -She remains with clinically volume overloaded status, would benefit from diuretics and nephrology has initiated patient on Lasix drip previously but in the setting of patient's continued declining renal function has  held off on further diuretics at this time.    Acute thrombocytopenia  Disseminated intravascular coagulation    -Patient with development of thrombocytopenia this morning initially concerning for HIT however fibrinogen level and D-dimer level checked and less than 100 and greater than 20 respectively with an LDH greater than 700 highly consistent concerning for DIC despite no clinical evidence of bleeding at this time from any IV sites.  Hematology/oncology consultation obtained given complicated situation given the fact the patient-now has a contraindication anticoagulation but also at the same time has a pulmonary embolism as well as having evidence of still ongoing liver dysfunction and cirrhosis with also atrial fibrillation/flutter.    -Platelets decreased to less than 20,000 on repeat CBC and dimer remaining elevated with low fibrinogen and patient given cryoprecipitate and platelets.    -Continue every 6 hour checks of hemoglobin, platelets, dimer, fibrinogen, LDH.    -Patient is essentially multiorgan failure at this point despite her respiratory status improving somewhat and is at high risk for further decompensation and ultimately death.    Acute kidney injury  Rhabdomyolysis  Hyperkalemia    -Suspected secondary to ATN and possible contrast-induced nephropathy.  Creatinine still trending upward at this time.    -Nephrology consulted and following along, appreciate input and help    -CK improving, continue supportive care    Cirrhosis  Shock Liver, improving  Metabolic encephalopathy    -Continue lactulose, 30 g 3 times daily    -liver enzymes trending favorably, however INR increasing today.    Polysubstance abuse    -Patient positive for methamphetamines at admission    -Complicates all aspects of care and could be a significant contributor to patient's current situation and DSE.    Approximately 45 minutes of critical care time was spent seeing and evaluating the patient, reviewing laboratory data,  obtaining further laboratory evaluation and coordinating care and speaking with consultants to facilitate further treatment and prevent further decompensation and death as patient is extremely high risk at this time.    VTE Prophylaxis:   Mechanical Order History:     None      Pharmalogical Order History:      Ordered     Dose Route Frequency Stop    12/09/21 0425  heparin (porcine) 5000 UNIT/ML injection 5,000 Units         5,000 Units IV Once 12/09/21 0449    12/09/21 0425  heparin 31810 units/250 mL (100 units/mL) in 0.45 % NaCl infusion  9.99 mL/hr         7.87 Units/kg/hr IV Titrated --    12/09/21 0425  heparin (porcine) 5000 UNIT/ML injection 5,000 Units         5,000 Units IV As Needed --    12/09/21 0425  heparin (porcine) 5000 UNIT/ML injection 2,500 Units         2,500 Units IV As Needed --                Disposition pending bed at [facility] with heart failure/heart transplant service.    Isael Barclay DO  [facility]  12/15/21  18:02 EST

## 2021-12-15 NOTE — PROGRESS NOTES
Dr. NEDA Murphy    [facility] CRITICAL CARE    12/15/2021    Patient ID:  Name:  Tiera Mendoza  MRN:  [id number]  1975  46 y.o.  female            CC/Reason for visit: Pulmonary embolism, severe sepsis, a flutter     Interval hx: Patient is more awake but confused.  Sometimes she reorients and knows where she is but she does not have any insight into current condition or recent events.  Sometimes she is moaning and groaning     ROS:   Confused    Vitals:  Vitals:    12/15/21 0930 12/15/21 1000 12/15/21 1100 12/15/21 1200   BP: 119/83 (!) 125/101 (!) 146/112 (!) 122/106   Pulse: (!) 138 (!) 137 114 (!) 121   Resp:       Temp:       TempSrc:       SpO2: 95% 95% 93% 90%   Weight:       Height:               Body mass index is 45.43 kg/m².    Intake/Output Summary (Last 24 hours) at 12/15/2021 1222  Last data filed at 12/15/2021 0931  Gross per 24 hour   Intake 1454.06 ml   Output 1175 ml   Net 279.06 ml       Exam:  GEN:  No distress  Alert, oriented x 1   LUNGS: Distant and diminished  breath sounds bilat, no use of accessory muscles  CV:  Normal S1S2, without murmur, 1+ ankle edema  ABD:  Non tender, morbid obesity hampers rest of the exam      Scheduled meds:  albumin human, 25 g, Intravenous, TID  budesonide-formoterol, 2 puff, Inhalation, BID - RT  furosemide, 60 mg, Intravenous, TID  lactulose, 30 g, Oral, TID  methylPREDNISolone sodium succinate, 20 mg, Intravenous, Q24H  pantoprazole, 40 mg, Oral, Q AM  sodium chloride, 10 mL, Intravenous, Q12H  sodium chloride, 10 mL, Intravenous, Q12H      IV meds:                      DOBUTamine, 2-20 mcg/kg/min, Last Rate: 2.5 mcg/kg/min (12/15/21 1151)  heparin (porcine), 7.87 Units/kg/hr, Last Rate: Stopped (12/15/21 0630)  norepinephrine, 0.02-0.3 mcg/kg/min, Last Rate: Stopped (12/12/21 1643)  Pharmacy Consult,         Data Review:   I reviewed the patient's medications and new clinical results.    COVID19   Date Value Ref Range Status   12/09/2021 Not  Detected Not Detected - Ref. Range Final         Lab Results   Component Value Date    CALCIUM 8.4 (L) 12/15/2021    CALCIUM 8.4 (L) 12/15/2021    PHOS 6.5 (H) 12/15/2021    MG 3.1 (H) 12/15/2021    MG 2.5 12/10/2021    MG 2.1 12/10/2021     Results from last 7 days   Lab Units 12/15/21  0534 12/14/21 2015 12/14/21  0401 12/13/21  1216 12/13/21  0356 12/12/21  0910 12/12/21  0057 12/09/21  0426 12/09/21  0233   SODIUM mmol/L 134*  134* 133* 132*   < > 131*   < > 131*   < > 130*   POTASSIUM mmol/L 5.2  5.2 4.9 5.1   < > 5.6*   < > 5.7*   < > 4.3   CHLORIDE mmol/L 98  98 98 98   < > 97*   < > 96*   < > 93*   CO2 mmol/L 22.2  22.2 21.4* 19.1*   < > 22.0   < > 19.3*   < > 24.0   BUN mg/dL 111*  111* 103* 95*   < > 82*   < > 60*   < > 22*   CREATININE mg/dL 3.35*  3.35* 3.20* 3.12*   < > 3.18*   < > 2.54*   < > 1.05*   CALCIUM mg/dL 8.4*  8.4* 8.1* 7.4*   < > 7.4*   < > 8.0*   < > 8.7   BILIRUBIN mg/dL 6.3*  --  4.7*  --  5.5*   < > 4.8*   < > 2.8*   ALK PHOS U/L 185*  --  182*  --  192*   < > 172*   < > 113   ALT (SGPT) U/L 775*  --  1,101*  --  1,456*   < > 1,835*   < > 191*   AST (SGOT) U/L 337*  --  661*  --  1,567*   < > 2,999*   < > 134*   GLUCOSE mg/dL 146*  146* 155* 150*   < > 155*   < > 144*   < > 136*   WBC 10*3/mm3 26.64*  --  27.78*  --  26.58*  --  27.26*   < > 29.87*   HEMOGLOBIN g/dL 9.9*  --  9.8*  --  10.0*  --  10.5*   < > 10.5*   PLATELETS 10*3/mm3 24*  --  101*  --  134*  --  198   < > 318   INR  2.68*  --  2.25*  --   --   --  3.62*   < >  --    PROBNP pg/mL  --   --   --   --   --   --   --   --  4,147.0*    < > = values in this interval not displayed.     Results from last 7 days   Lab Units 12/09/21  0256 12/09/21  0233   BLOODCX  No growth at 5 days No growth at 5 days             ASSESSMENT:   Severe sepsis rule out  Cardiogenic shock with low ejection fraction  Pulmonary embolism  Pulmonary infiltrates  Hyponatremia  Hyperkalemia  Acute kidney injury  Atrial flutter  Elevated liver  enzymes  Morbid obesity  History of amphetamine use  History of asthma  Right pleural effusion  Dysphagia      PLAN:  She has had excellent mean arterial pressures so far.  I would suggest switching over to Levophed instead of dobutamine. She does have underlying atrial flutter/fibrillation and dobutamine is more arrhythmogenic than Levophed.  She is developing severe thrombocytopenia and elevated INR, suggestive of DIC. I would suggest holding off on all anticoagulation at this time and repeating fibrinogen, D-dimer, LDH and peripheral smear in the next 8 hours to see if we truly are experiencing a case of DIC. She is not having any evidence of bleeding or thrombosis at this time.  She may end up requiring cryoprecipitate infusion.  Her renal function is much worse.  From the pulmonary standpoint, her pulmonary embolism burden is small because she has been weaned off of oxygen on room air.  If she did have a pneumonia on admission it is minor and has been adequately treated with 4 days of broad-spectrum antibiotics IV.  I do not feel the patient is still septic.  If she does develop fever, I would recommend repeating blood cultures, and we cannot exclude endocarditis.  Her liver enzymes are gradually improving.  I believe her DIC may be multifactorial.  She is severely ill and requires transfer to a higher care facility such as [facility]. We are waiting for them to accept her but they have no bed availability.  Discussed with Dr. Barclay.    Total critical care time 36 minutes excluding any separately billable procedure time      Junaid Murphy MD  12/15/2021

## 2021-12-15 NOTE — CASE MANAGEMENT/SOCIAL WORK
Discharge Planning Assessment  SANDRA Green     Patient Name: Tiera Mendoza  MRN: [id number]  Today's Date: 12/15/2021    Admit Date: 12/9/2021     Discharge Plan     Row Name 12/15/21 1250       Plan    Plan Pt admitted 12/9/21. Pt was discussed with rounding team on this date. Pt is still waiting on a bed at . [facility] following.    Patient/Family in Agreement with Plan yes              Continued Care and Services - Admitted Since 12/9/2021     Destination     Service Provider Request Status Selected Services Address Phone Fax Patient Preferred    [facility]  Pending - No Request Sent N/A [address] [address] [phone] -- --              MYNOR Burrell

## 2021-12-15 NOTE — PROGRESS NOTES
Nurse Practitioner - Brief Progress Note  PERMANENT  12/15/2021 06:38    [facility] [place] [facility] - CCU - [facility] - [place], [place] ([facility])    FRANCISCO DUNAWAY    Date of Service 12/15/2021 06:38    HPI/Events of Note [facility] Provider Intervention Note    Pt with acute pulmonary embolism, AFib/flutter, shock liver. Heparin infusion started. RN reports PTT >100, Hgb 9.9/Hct 31.5 down from 9.8/31.1, platelets 24 down from 101 yesterday, INR 2.25 yesterday. No active bleeding noted.     Plan:  Hold Heparin infusion  HIT antibody, repeat PT/INR, repeat PTT  Protonix for GI prophylaxis  Consider Argatroban infusion    D/W intensivist Dr. Louise      I spent __0___ minutes of critical care time with this patient who is at high risk for life threatening clinical deterioration.  [facility] for any needs if bedside physician is not present.      Interventions Intermediate-Best-practice therapies (e.g. VTE, beta blocker, etc.), Communication with other healthcare providers and/or family        Electronically Signed by: Kori Rodriguez (ANP,CCRN) on 12/15/2021 06:56    Annotated By: Kori Rodriguez (ANP, CCRN)    Date: 12/15/21 06:56  Fibrinogen ordered.

## 2021-12-15 NOTE — CONSULTS
Name:  Tiera Mendoza  :  1975    DATE OF ADMISSION  2021    DATE OF CONSULT  12/15/2021     REFERRING PHYSICIAN  Dr. Barclay     PRIMARY CARE PHYSICIAN  Alan Martin APRN    REASON FOR CONSULT  Thrombocytopenia, concern for DIC in setting of acute PE    CHIEF COMPLAINT:  Chief Complaint   Patient presents with   • Shortness of Breath       HISTORY OF PRESENT ILLNESS:   Tiera Mendoza is a 46 y.o. female with history of substance abuse (methamphetamine), asthma and diabetes mellitus who is being seen in consultation at the request of Dr. Barclay for further evaluation and management of thrombocytopenia in the setting of an acute PE. Ms. Mendoza presented to [facility] ED with complaints of increased shortness of breath. She was admitted with a-fib with RVR, severe sepsis secondary to pneumonia and CT chest with PE protocol also showed a right lower lobe PE and was started on heparin gtt. On admission, noted normal platelet count of ~318,000, normocytic anemia Hg 10.5 along with leukocytosis WBC ~29.87. UDS was also positive for methamphetamine on admission. She has since had a complicated hospital course and developed cardiogenic shock/heart failure, shock liver, rhabdomyolysis and CELSO. Her platelets started trending down and significantly dropped over the past three days from ~134,000 to ~24,000. Heparin gtt has been held. HIT abs and PBS were sent by primary team and currently pending. PTT >100, INR elevated ~2.68 and fibrinogen is low <70 which is concerning for DIC. At present, patient is resting in bed and unable to obtain history given current mental status. She is alert and oriented to self. She has no obvious bleeding on exam. She says she has trouble catching her breath. She denies any other complaints at this time.     In terms of etiology for her multisystem organ failure and DIC, this remains somewhat unclear.  Initially she was felt to have pneumonia when she was admitted, but cultures have been no  "growth and the hospitalist team, ID and pulmonology have felt ongoing antibiotic therapy not to be warranted.  She has been found to have multisystem organ failure with cardiogenic shock, renal failure, shock liver and is now in DIC.  She has a known PE as above.  She has some bruising over her extremities at IV / phlebotomy sites and sites of minor trauma but no abnormal bleeding.      PAST MEDICAL HISTORY  Past Medical History:   Diagnosis Date   • Asthma    • Diabetes mellitus (HCC)     GDM last pregnancy   • History of atrial fibrillation    • Substance abuse (HCC)        PAST SURGICAL HISTORY  Past Surgical History:   Procedure Laterality Date   •  SECTION      x 3   • HIP FRACTURE SURGERY  2017       SOCIAL HISTORY  Social History     Socioeconomic History   • Marital status:    Tobacco Use   • Smoking status: Never Smoker   • Smokeless tobacco: Current User     Types: Chew   Vaping Use   • Vaping Use: Never used   Substance and Sexual Activity   • Alcohol use: No   • Drug use: Yes     Types: Hydrocodone, Codeine, Methamphetamines     Comment: \"stopped around 6 weeks pregnant\"   • Sexual activity: Defer       FAMILY HISTORY  History reviewed. No pertinent family history.    ALLERGIES  No Known Allergies    INPATIENT MEDICATIONS  Current Facility-Administered Medications   Medication Dose Route Frequency Provider Last Rate Last Admin   • albumin human 25 % IV SOLN 25 g  25 g Intravenous TID Kirstin Esparza MD   25 g at 12/15/21 0931   • budesonide-formoterol (SYMBICORT) 160-4.5 MCG/ACT inhaler 2 puff  2 puff Inhalation BID - RT Natanael Toscano MD   2 puff at 12/15/21 0734   • calcium carbonate (TUMS) chewable tablet 500 mg (200 mg elemental)  1 tablet Oral TID PRN Natanael Toscano MD   1 tablet at 21 0323   • dextrose (D50W) (25 g/50 mL) IV injection 25 g  25 g Intravenous Q15 Min PRN Natanael Toscano MD       • dextrose (D50W) (25 g/50 mL) IV injection 25 g  25 g Intravenous Q15 Min PRN " Natanael Toscano MD       • dextrose (GLUTOSE) oral gel 15 g  15 g Oral Q15 Min PRN Natanael Toscano MD       • dextrose (GLUTOSE) oral gel 15 g  15 g Oral Q15 Min PRN Natanael Toscano MD       • DOBUTamine (DOBUTREX) 500 mg in 250 mL D5W infusion  2-20 mcg/kg/min Intravenous Titrated Leon Faye MD 10.13 mL/hr at 12/14/21 0350 2.5 mcg/kg/min at 12/14/21 0350   • furosemide (LASIX) injection 60 mg  60 mg Intravenous TID Kirstin Esparza MD   60 mg at 12/15/21 0931   • glucagon (human recombinant) (GLUCAGEN DIAGNOSTIC) injection 1 mg  1 mg Subcutaneous Q15 Min PRN Natanael Toscano MD       • glucagon (human recombinant) (GLUCAGEN DIAGNOSTIC) injection 1 mg  1 mg Subcutaneous Q15 Min PRN Natanael Toscano MD       • heparin (porcine) 5000 UNIT/ML injection 2,500 Units  2,500 Units Intravenous PRN Natanael Toscano MD   2,500 Units at 12/11/21 1332   • heparin (porcine) 5000 UNIT/ML injection 5,000 Units  5,000 Units Intravenous PRN Natanael Toscano MD   5,000 Units at 12/10/21 1658   • heparin 10005 units/250 mL (100 units/mL) in 0.45 % NaCl infusion  7.87 Units/kg/hr Intravenous Titrated Natanael Toscano MD   Stopped at 12/15/21 0630   • ipratropium-albuterol (DUO-NEB) nebulizer solution 3 mL  3 mL Nebulization Q6H PRN Junaid Murphy MD   3 mL at 12/15/21 0734   • lactulose (CHRONULAC) 10 GM/15ML solution 30 g  30 g Oral TID Isael Barclay DO   30 g at 12/15/21 0841   • methylPREDNISolone sodium succinate (SOLU-Medrol) injection 20 mg  20 mg Intravenous Q24H Isael Barclay DO   20 mg at 12/15/21 0841   • norepinephrine (LEVOPHED) 8 mg in 250 mL NS infusion (premix)  0.02-0.3 mcg/kg/min Intravenous Titrated Leia Roth MD   Held at 12/12/21 1643   • ondansetron (ZOFRAN) injection 4 mg  4 mg Intravenous Q6H PRN Natanael Toscano MD   4 mg at 12/12/21 0324   • pantoprazole (PROTONIX) EC tablet 40 mg  40 mg Oral Q AM Kori Rodriguez APRN   40 mg at 12/15/21 0840   • Pharmacy Consult   Does not apply Continuous PRN  "Leon Faye MD       • sodium chloride 0.9 % flush 10 mL  10 mL Intravenous PRN Natanael Toscano MD       • sodium chloride 0.9 % flush 10 mL  10 mL Intravenous Q12H Natanael Toscano MD   10 mL at 12/15/21 0841   • sodium chloride 0.9 % flush 10 mL  10 mL Intravenous PRN Natanael Toscano MD       • sodium chloride 0.9 % flush 10 mL  10 mL Intravenous Q12H Natanael Toscano MD   10 mL at 12/15/21 0841   • sodium chloride 0.9 % flush 10 mL  10 mL Intravenous PRN Natanael Toscano MD           Review of Systems  Unable to obtain detailed ROS given current mental status. + Shortness of breath; otherwise negative.     Physical Exam   Vital Signs: /83   Pulse (!) 138   Temp 97.9 °F (36.6 °C)   Resp 19   Ht 172.7 cm (68\")   Wt 136 kg (298 lb 12.8 oz)   SpO2 95%   BMI 45.43 kg/m²   General: Obese, alert, oriented to self only. Drowsy.   Able to reply \"2\" when I asked how many fingers I was holding up but generally slow to answer questions and does not respond appropriately to most questioning.  Jaundiced  Head: ATNC   Eyes: PERRL, No evidence of conjunctivitis. + scleral icterus  Nose: No nasal discharge.   Mouth: Oral mucosal membranes dry. No oral ulceration or hemorrhages.   Neck: Neck supple. No thyromegaly. No JVD.   Lungs: +scattered wheezes and Rhonchorous on the right; otherwise CTA  Heart: Tachycardia.  No murmurs, rubs, or gallops.   Abdomen: Soft. Bowel sounds are normoactive. Nontender with palpation.   Extremities: No cyanosis or edema.  Integumentary: Scattered bruising bilateral upper/lower extremities at sites of minor truama, phlebotomy, IV sites.  No abnormal bleeding.  :  Odom bag with scant dark yellow urine.  No gross hematuria  Neurologic: MS as above. Grossly non focal exam    IMAGING:  Adult Transthoracic Echo Complete W/ Cont if Necessary Per Protocol    Result Date: 12/9/2021  · The left ventricular cavity is moderately dilated. · Left ventricular ejection fraction appears to be " less than 20%. Left ventricular systolic function is severely decreased. · The aortic valve is structurally normal with no regurgitation or stenosis present. · The mitral valve is grossly normal in structure. Mild to moderate mitral valve regurgitation is present. No significant mitral valve stenosis is present. · There is no evidence of pericardial effusion.      CT Abdomen Pelvis Without Contrast    Result Date: 12/11/2021  EXAM:   CT Abdomen and Pelvis Without Intravenous Contrast  EXAM DATE:   12/11/2021 11:18 AM  CLINICAL HISTORY:   CELSO, Increased LFTs; I26.93-Single subsegmental pulmonary embolism without acute cor pulmonale; J18.9-Pneumonia, unspecified organism; A41.9-Sepsis, unspecified organism  TECHNIQUE:   Axial computed tomography images of the abdomen and pelvis without intravenous contrast.  Sagittal and coronal reformatted images were created and reviewed.  This CT exam was performed using one or more of the following dose reduction techniques:  automated exposure control, adjustment of the mA and/or kV according to patient size, and/or use of iterative reconstruction technique.  COMPARISON:   10/23/2018  FINDINGS:   Lung bases:  Partially consolidative airspace disease right greater than left lung bases.  Interstitial thickening and groundglass attenuation both lung bases.   Pleural space:  Small right pleural effusion.   Heart:  Marked cardiomegaly.   ABDOMEN:   Liver:  Liver cirrhosis and fatty changes of the liver are noted.   Gallbladder and bile ducts:  Gallbladder is incompletely distended. No calcified stones.  No ductal dilation.   Pancreas:  Unremarkable.  No ductal dilation.   Spleen:  Unremarkable.  No splenomegaly.   Adrenals:  Unremarkable.  No mass.   Kidneys and ureters:  Unremarkable.  No obstructing stones.  No hydronephrosis.   Stomach and bowel:  Gastric distention is noted.  No bowel obstruction.  No mucosal thickening.   PELVIS:   Appendix:  The appendix is not visualized.    Bladder:  Unremarkable.  No stones.   Reproductive:  Bilateral tubal ligation clips are noted.   ABDOMEN and PELVIS:   Intraperitoneal space:  Trace ascites.  No pneumoperitoneum identified.   Bones/joints:  Degenerative changes lumbar spine.  No acute fracture. No dislocation.   Soft tissues:  Anasarca noted diffusely.   Vasculature:  Unremarkable.  No abdominal aortic aneurysm.   Lymph nodes:  Unremarkable.  No enlarged lymph nodes.      1.  Bibasilar pneumonia and changes of CHF/edema. 2.  Small right pleural effusion and marked cardiomegaly. 3.  Diffuse anasarca. 4.  Liver cirrhosis and fatty changes of the liver. Miniscule ascites. 5.  Other nonacute findings as above.  This report was finalized on 12/11/2021 11:46 AM by Dr. Julius Arvizu MD.      US Liver    Result Date: 12/11/2021  US Liver Or Hepatic INDICATION: Elevated transaminase. History of pulmonary embolus. COMPARISON: None available. FINDINGS: LIVER: The echogenicity and echotexture of the hepatic parenchyma is within normal limits. No hepatic mass. The intrahepatic bile ducts are normal in caliber. The common duct is normal in size at the talat hepatis measuring 2.1 mm.     Negative hepatic ultrasound. Signer Name: Sourav Carrillo MD  Signed: 12/11/2021 7:58 AM  Workstation Name: [id number]-  [facility]    US Gallbladder    Result Date: 12/11/2021  EXAMINATION: US GALLBLADDER-   CLINICAL INDICATION:     Abd pain, elevated transaminases; I26.93-Single subsegmental pulmonary embolism without acute cor pulmonale; J18.9-Pneumonia, unspecified organism; A41.9-Sepsis, unspecified organism  TECHNIQUE: Multiplanar gray scale ultrasound of the right upper quadrant abdomen.  COMPARISON: CT same day  FINDINGS: Pancreatic bed: Not visualized. Gallbladder: Gallbladder is unremarkable. No shadowing stones or wall thickening. No pericholecystic fluid. Bile ducts: The CBD measures 4.40 mm. Liver: Liver cirrhosis. Fatty liver.  Fluid: No  ascites demonstrated. Sonographic Sherman's sign: Technologist reports a negative sonographic Sherman sign.      1. Liver cirrhosis. 2. Unremarkable sonographic appearance of gallbladder.  This report was finalized on 12/11/2021 1:15 PM by Dr. Julius Arvizu MD.      XR Chest 1 View    Result Date: 12/12/2021  EXAM:   XR Chest, 1 View  EXAM DATE:   12/12/2021 1:58 PM  CLINICAL HISTORY:   central line placement verification; I26.93-Single subsegmental pulmonary embolism without acute cor pulmonale; J18.9-Pneumonia, unspecified organism; A41.9-Sepsis, unspecified organism  TECHNIQUE:   Frontal view of the chest.  COMPARISON:   12/09/2021  FINDINGS:   Lungs:  Worsening by lateral airspace disease which may represent edema.   Pleural space:  No pneumothorax.  Small effusions.   Heart:  Cardiomegaly.   Mediastinum:  Unremarkable.   Bones/joints:  Unremarkable.   Tubes, lines and devices:  Right IJ deep line placement with tip at the cavoatrial junction.      1.  Right IJ deep line placement. No pneumothorax. 2.  Worsening changes of probable CHF/edema.  This report was finalized on 12/12/2021 2:37 PM by Dr. Julius Arvizu MD.      XR Chest 1 View    Result Date: 12/9/2021  CR Chest 1 Vw INDICATION: Shortness during cough, follow pneumonia COMPARISON:  220 FINDINGS: Portable AP view(s) of the chest. There Are low lung volumes with mild right base atelectasis or infiltrate. Left lung appears clear. Heart size normal. Bones are     Low inspiratory volumes with probable right lower lobe infiltrate or atelectasis Signer Name: Kevin Barrios MD  Signed: 12/9/2021 3:26 AM  Workstation Name: [email or web address]-  [facility]    CT Chest Pulmonary Embolism    Result Date: 12/9/2021  CT CHEST PULMONARY EMBOLISM W CONTRAST INDICATION: Sepsis and recurrent pneumonia TECHNIQUE: CT angiogram of the chest with IV contrast. 3-D reconstructions were obtained and reviewed.   Radiation dose reduction techniques included automated  exposure control or exposure modulation based on body size. Count of known CT and cardiac nuc med studies performed in previous 12 months: 0. COMPARISON: None available. FINDINGS: There is adequate opacification of pulmonary arteries. There is right lower lobe pulmonary embolus which is occlusive bases. There is dense consolidation throughout the right lower lobe. There is mild interstitial prominence throughout the lungs there are some patchy areas of groundglass density in the upper lobes. Aorta is normal in size. There is a small right effusion. There is no adenopathy.     Right lower lobe pulmonary embolus which appears occlusive in the lower branches. There is no evidence of pulmonary infarct. Dense groundglass infiltrate throughout the right lower lobe Patchy groundglass infiltrates throughout the rest the lungs with diffuse interstitial prominence. There is no evidence of right heart enlargement I discussed these results with Dr. Naranjo at the time of this dictation Signer Name: Kevin Barrios MD  Signed: 12/9/2021 4:09 AM  Workstation Name: [facility]-  [facility]    US Venous Doppler Lower Extremity Bilateral (duplex)    Result Date: 12/11/2021  US Veins LE Duplex BILAT HISTORY: Pulmonary embolus. Looking for source. TECHNIQUE: Real-time ultrasound was performed of both lower extremities utilizing spectral and color Doppler with compression and augmentation techniques. COMPARISON: None available. FINDINGS: Right Lower Extremity: There is no deep venous thrombus seen in the right lower extremity, including the right common femoral veins, right femoral veins and right popliteal veins.  Normal compressibility and respiratory phasicity was visualized.  No calf vein thrombus. Greater saphenous vein is patent. Left Lower Extremity: There is no deep venous thrombus seen in the left lower extremity, including the left common femoral veins, left femoral veins and left popliteal veins.   Normal  compressibility and respiratory phasicity was visualized.  No calf vein thrombus. Greater saphenous vein is patent.     No deep venous thrombus seen in either lower extremity. Signer Name: Sourav Carrillo MD  Signed: 12/11/2021 7:56 AM  Workstation Name: [id number]  [facility]      RECENT LABS:  Lab Results   Component Value Date    WBC 26.64 (H) 12/15/2021    HGB 9.9 (L) 12/15/2021    HCT 31.5 (L) 12/15/2021    MCV 75.9 (L) 12/15/2021    RDW 18.7 (H) 12/15/2021    PLT 24 (C) 12/15/2021    NEUTRORELPCT 90.1 (H) 12/14/2021    LYMPHORELPCT 2.6 (L) 12/14/2021    MONORELPCT 5.1 12/14/2021    EOSRELPCT 0.6 12/14/2021    BASORELPCT 0.2 12/14/2021    NEUTROABS 23.44 (H) 12/15/2021    LYMPHSABS 0.73 12/14/2021       Lab Results   Component Value Date     (L) 12/15/2021     (L) 12/15/2021    K 5.2 12/15/2021    K 5.2 12/15/2021    CO2 22.2 12/15/2021    CO2 22.2 12/15/2021    CL 98 12/15/2021    CL 98 12/15/2021     (H) 12/15/2021     (H) 12/15/2021    CREATININE 3.35 (H) 12/15/2021    CREATININE 3.35 (H) 12/15/2021    EGFRIFNONA 15 (L) 12/15/2021    EGFRIFNONA 15 (L) 12/15/2021    GLUCOSE 146 (H) 12/15/2021    GLUCOSE 146 (H) 12/15/2021    CALCIUM 8.4 (L) 12/15/2021    CALCIUM 8.4 (L) 12/15/2021    ALKPHOS 185 (H) 12/15/2021     (H) 12/15/2021     (H) 12/15/2021    BILITOT 6.3 (H) 12/15/2021    ALBUMIN 3.17 (L) 12/15/2021    ALBUMIN 3.17 (L) 12/15/2021    PROTEINTOT 7.2 12/15/2021    MG 3.1 (H) 12/15/2021    PHOS 6.5 (H) 12/15/2021       ASSESSMENT & PLAN:  Tiera Mendoza is a  46 y.o. female with    1.  Thrombocytopenia, concerning for DIC in setting of acute RLL PE:  -Unfortunately, this is a very complicated situation. On admission, noted normal platelet count of ~318,000. UDS was also positive for methamphetamine. Her platelets started trending down and significantly dropped over the past three days from ~134,000 to ~24,000.   - She was initially admitted  with a-fib with RVR, severe sepsis secondary to pneumonia and CT chest with PE protocol also showed a right lower lobe PE and was started on heparin gtt. She has since had a complicated hospital course and developed cardiogenic shock/heart failure, shock liver, rhabdomyolysis and CELSO. Heparin gtt has been held. HIT abs and PBS were sent by primary team and currently pending. PTT >100, INR elevated ~2.68 and fibrinogen is low <70 which is concerning for DIC. DIC possibly secondary to methamphetamine use along with sepsis. HIT is also possibility but less likely (HIT abs pending). At present, she has no obvious bleeding from any source. Would continue holding heparin gtt for now given significant thrombocytopenia. She is not a candidate or alternative anticoagulation such as argatroban given liver and renal dysfunction.   -Recommend 10 units of Cryoprecipitate to get fibrinogen >100. Suggest to transfuse platelets for platelets <50,000 prior to procedures or any active bleeding. Otherwise, transfuse for platelet count 10-20,000.  -Recommend to check CBC, PT/INR, PTT and Fibrinogen q6 hours.  -Will check Factor 5, 7, 8 to further evaluate and follow up with pending HIT abs and PBS.   -Will closely monitor and Dr. Olson will discuss the above with Dr. Barclay.       Dr. Olson Addendum:    PEDRO Bravo and I reviewed all clinical information and saw and examined Ms. Mendoza together.  I also spoke with Dr. Barclay.  Unfortunately Ms. Mendoza is very very ill and her condition is very complicated with multiple competing priorties and mutliple failing organ systems with cardiogenic shock, shock liver, acute renal failure and DIC..  She is at risk of continued life threatening clotting with recent PE and is also at risk for life threatening bleeding given coaguloapathy and thrombocytopenia.    In terms of her pulmonary embolism and thrombocytopenia, it is likely that both are related to DIC.  She could have HIT, but this  wouldn't explain other findings including hypofibrinogemia.  She is not a candidate for alternative anticoagulation such as argatroban or lepirudin given abnormal liver and renal function.  She is also at this point in time not a good candidate for  Heparin despite her PE because currently her plts are well less than 50 and she is coagulopathic with PTT >100.  This could be in part related to recent heparin use but could also be related to combination of liver failure and DIC.      She is not currently bleeding though this could change.    Would recommend the following:  -  Would transfuse cryoprecipitate 10 pack with goal Fibrinogen >100.  -  Would check Fibrinogen, PT/PTT, CBC q6h.  Would transfuse plts if they fall below 10-20K in the absence of bleeding or would transfuse for plts <50K if she should develop active bleeding.  -  Would check Factors V, VII, VIIIc which could help to further elucidate cause of coagulopathy but this will take some time to come back.  Await HIT Ab/DORIAN though again these wouldn't affect our management at this current time.    Overall DIC is in addition to a problem in and of itself, a symptom of underlying illness.  The most important treatment for DIC is correction of the underlying problem.  Most frequently this is infection.  In this case, could also be related to methamphetamine use or systemic illness (cardiogenic shock, multisystem organ failure, etc).    Prognosis is very guarded.  Agree with transfer to higher level of care if possible.       Thank you so much for the consultation and allowing us to participate in the care of Ms Mendoza. We will continue to follow with you.  Please do not hesitate to contact Hem/Onc with any questions or concerns.         Electronically Signed by: PEDRO Centeno , December 15, 2021 09:46 MAGALIE Olson MD , 12/09/2021 , 11:45 EST

## 2021-12-15 NOTE — PROGRESS NOTES
LOS: 6 days     Name: Tiera Ridner  Age/Sex: 46 y.o. female  :  1975        PCP: Alan Martin APRN  REF: No Known Provider    Active Problems:    Single subsegmental pulmonary embolism without acute cor pulmonale (HCC)      Reason for follow-up: Atrial fibrillation and cardiomyopathy     Subjective       Subjective     46-year-old woman has been admitted with chief complaint of progressive shortness of breath for the last 3 months.      Interval History: Patient more alert today however history is limited. Reports some shortness of breath. Platelets found to be 24,000. Heparin drip has been held. PTT greater than 100 and INR 2.68 with fibrinogen as low as less than 70 concerning for DIC. Remains on low-dose dobutamine drip. Currently atrial flutter in the 120s. Creatinine 3.35. Awaiting bed placement at tertiary center.    Vital Signs  Temp:  [97.4 °F (36.3 °C)-97.9 °F (36.6 °C)] 97.9 °F (36.6 °C)  Heart Rate:  [] 138  Resp:  [14-] 19  BP: ()/() 119/83     Vital Signs (last 72 hrs)        0700   0659  0700   0659  0700  12/15 0659 12/15 0700  12/15 1004   Most Recent      Temp (°F) 97.3 -  97.9    97.5 -  97.6    97.3 -  97.7      97.9     97.9 (36.6) 12/15 0800    Heart Rate 82 -  124    92 -  128    94 -  156    107 -  139     138 12/15 0930    Resp 8 -  24    13 -  20    14 -  16      19     19 12/15 0734    BP 76/48 -  137/104    84/77 -  141/103    79/47 -  193/151    103/77 -  140/90     119/83 12/15 0930    SpO2 (%) 87 -  100    89 -  100    88 -  100    93 -  100     95 12/15 0930        Documented weights    21 0155 12/09/21 2001 12/10/21 0500 21 0500   Weight: 127 kg (280 lb) 131 kg (288 lb 8 oz) 131 kg (288 lb 12.8 oz) 135 kg (298 lb 3.2 oz)    21 0500 21 1455 12/15/21 0410   Weight: 132 kg (290 lb 11.2 oz) (!) 137 kg (301 lb 13 oz) 136 kg (298 lb 12.8 oz)      Body mass index is 45.43 kg/m².    Intake/Output Summary  (Last 24 hours) at 12/15/2021 1004  Last data filed at 12/15/2021 0931  Gross per 24 hour   Intake 1454.06 ml   Output 1175 ml   Net 279.06 ml     Objective    Objective     I have seen and examined Ms. Toribio today  Physical Exam:     General Appearance:    Alert, cooperative, in no acute distress   Head:    Normocephalic, without obvious abnormality, atraumatic   Eyes:            Conjunctivae and sclerae normal, no   icterus, no pallor, corneas clear.   Neck:   No adenopathy, supple, trachea midline, no thyromegaly, no   carotid bruit, no JVD   Lungs:    Rales at right base,respirations regular, even and                  unlabored    Heart:    Regularly irregular rhythm and normal rate, normal S1 and S2, grade 2/6 systolic ejection murmur LLSB, no gallop, no rub, no click   Chest Wall:    No abnormalities observed   Abdomen:     Normal bowel sounds, no masses, no organomegaly, soft        non-tender, non-distended, no guarding, no rebound                tenderness   Extremities:   Moves all extremities well, 1+ edema on both legs, no cyanosis, no             redness   Pulses:   Pulses palpable and equal bilaterally   Skin:  Scattered bilateral bruising to upper and lower extremities, mildly jaundiced       Neurologic:   Alert      Results review       Results Review:   Results from last 7 days   Lab Units 12/15/21  0534 12/14/21  0401 12/13/21  0356 12/12/21  0057 12/10/21  2251 12/10/21  0044 12/09/21  0233   WBC 10*3/mm3 26.64* 27.78* 26.58* 27.26* 31.70* 19.78* 29.87*   HEMOGLOBIN g/dL 9.9* 9.8* 10.0* 10.5* 9.8* 10.4* 10.5*   PLATELETS 10*3/mm3 24* 101* 134* 198 256 267 318     Results from last 7 days   Lab Units 12/15/21  0534 12/14/21  2015 12/14/21  0401 12/13/21  1216 12/13/21  0356 12/12/21  2139 12/12/21  1736 12/12/21  1408 12/12/21  0910 12/12/21  0057 12/12/21  0057 12/11/21  1819 12/11/21  1819   SODIUM mmol/L 134*  134* 133* 132* 130* 131* 132* 127*   < > 128*   < > 131*   < > 128*   POTASSIUM  mmol/L 5.2  5.2 4.9 5.1 5.3* 5.6* 5.4* 5.3*   < > 6.6*   < > 5.7*   < > 5.1   CHLORIDE mmol/L 98  98 98 98 97* 97* 96* 95*   < > 95*   < > 96*   < > 96*   CO2 mmol/L 22.2  22.2 21.4* 19.1* 21.8* 22.0 23.7 19.3*   < > 14.6*   < > 19.3*   < > 17.0*   BUN mg/dL 111*  111* 103* 95* 90* 82* 76* 71*   < > 63*   < > 60*   < > 51*   CREATININE mg/dL 3.35*  3.35* 3.20* 3.12* 3.27* 3.18* 3.03* 2.99*   < > 2.69*   < > 2.54*   < > 2.47*   CALCIUM mg/dL 8.4*  8.4* 8.1* 7.4* 7.3* 7.4* 7.3* 7.5*   < > 8.0*   < > 8.0*   < > 7.8*   GLUCOSE mg/dL 146*  146* 155* 150* 171* 155* 148* 165*   < > 113*   < > 144*   < > 164*   ALT (SGPT) U/L 775*  --  1,101*  --  1,456*  --  1,577*  --  1,804*  --  1,835*  --  1,875*   AST (SGOT) U/L 337*  --  661*  --  1,567*  --  2,069*  --  2,462*  --  2,999*  --  3,842*    < > = values in this interval not displayed.     Results from last 7 days   Lab Units 12/13/21  0356 12/12/21  0057 12/11/21  0445 12/11/21  0124 12/10/21  2251 12/10/21  0303 12/09/21  2132 12/09/21  1636   CK TOTAL U/L 859* 1,495* 2,338*  --  2,714*  --   --   --    TROPONIN T ng/mL  --   --  0.019 0.015  --  <0.010 <0.010 <0.010     Lab Results   Component Value Date    INR 2.68 (H) 12/15/2021    INR 2.25 (H) 12/14/2021    INR 3.62 (H) 12/12/2021    INR 3.21 (H) 12/11/2021    INR 1.66 (H) 12/09/2021    INR 1.42 (H) 12/09/2021     Lab Results   Component Value Date    MG 3.1 (H) 12/15/2021    MG 2.5 12/10/2021    MG 2.1 12/10/2021     Lab Results   Component Value Date    TSH 4.040 12/10/2021      Imaging Results (Last 48 Hours)     ** No results found for the last 48 hours. **        Echo   Results for orders placed during the hospital encounter of 12/09/21    Adult Transthoracic Echo Complete W/ Cont if Necessary Per Protocol    Interpretation Summary  · The left ventricular cavity is moderately dilated.  · Left ventricular ejection fraction appears to be less than 20%. Left ventricular systolic function is severely  decreased.  · The aortic valve is structurally normal with no regurgitation or stenosis present.  · The mitral valve is grossly normal in structure. Mild to moderate mitral valve regurgitation is present. No significant mitral valve stenosis is present.  · There is no evidence of pericardial effusion.     I reviewed the patient's new clinical results.    Telemetry: Atrial flutter 120s    Medication Review:   albumin human, 25 g, Intravenous, TID  budesonide-formoterol, 2 puff, Inhalation, BID - RT  furosemide, 60 mg, Intravenous, TID  lactulose, 30 g, Oral, TID  methylPREDNISolone sodium succinate, 20 mg, Intravenous, Q24H  pantoprazole, 40 mg, Oral, Q AM  sodium chloride, 10 mL, Intravenous, Q12H  sodium chloride, 10 mL, Intravenous, Q12H        DOBUTamine, 2-20 mcg/kg/min, Last Rate: 2.5 mcg/kg/min (12/14/21 0350)  heparin (porcine), 7.87 Units/kg/hr, Last Rate: Stopped (12/15/21 0630)  norepinephrine, 0.02-0.3 mcg/kg/min, Last Rate: Stopped (12/12/21 1133)  Pharmacy Consult,         Assessment      Assessment:  1. Acute systolic heart failure.  2. Newly diagnosed advanced cardiomyopathy of unclear etiology.  3. Persistent atrial flutter/fibrillation with intermittent rapid ventricular response  4. Acute pulmonary embolism  5. Acute renal failure, multifactorial.  6. Shock liver.  7. Multi organ system failure.     Plan     Recommendations:  1. Patient has been on dobutamine infusion over the last 3 to 4days with continued worsening of her kidney function and also developing tachycardia with atrial flutter/fibrillation.  Hence we will go ahead and discontinue the dobutamine fusion  2. May consider other pressors such as norepinephrine if needed for adequate blood pressure control.  3. IV heparin is on hold due to suspicion for DIC.  4. Patient is extremely sick with the guarded prognosis at this time.    I discussed the patients findings and my recommendations with Dr. Barclay      Electronically signed by Jackie  PEDRO Felipe, 12/15/21, 10:04 AM EST.    Electronically signed by Clay Toussaint MD, 12/15/21, 1:23 PM EST.      Please note that portions of this note were completed with a voice recognition program.

## 2021-12-16 NOTE — PLAN OF CARE
Goal Outcome Evaluation:  Plan of Care Reviewed With: patient        Progress: no change  Outcome Summary: Patient received cryo and workup for hyperkalemia. Repeat labs pending. Still no bed available at .

## 2021-12-16 NOTE — PROGRESS NOTES
[facility] Physician - Brief Progress Note  PERMANENT  12/16/2021 01:39    [facility] [place] [facility] [place] ([facility])    FRANCISCO DUNAWAY    Date of Service 12/16/2021 01:39    HPI/Events of Note Pt with fibrinogen < 70, persistent coagulopathy, K now 6.1  Ordered hyperkalemia protocol  Ordered cryoprecipitate transfusion  Repeat K in 2 hours  Check ABG as BMP with worsening acidosis      Interventions Major-Other: DIC, shock liver, cardiogenic shock  Minor-Communication with other healthcare providers and/or family        Electronically Signed by: Marcos Louise) on 12/16/2021 01:41

## 2021-12-16 NOTE — PROGRESS NOTES
Nephrology Progress Note      Subjective     Pt is still confused, not responding to vocal commands, has more respiratory distress obvious     Objective       Vital signs :     Temp:  [97.1 °F (36.2 °C)-98.4 °F (36.9 °C)] 97.6 °F (36.4 °C)  Heart Rate:  [100-138] 115  Resp:  [16-24] 18  BP: ()/() 122/85      Intake/Output Summary (Last 24 hours) at 12/16/2021 0732  Last data filed at 12/16/2021 0540  Gross per 24 hour   Intake 705.55 ml   Output 1050 ml   Net -344.45 ml       Physical Exam:    General Appearance : not in acute distress  Lungs : B/L lower zone crackles  Heart :  regular rhythm & normal rate, normal S1, S2 and no murmur, no rub  Abdomen : normal bowel sounds, no masses, no hepatomegaly, no splenomegaly, soft non-tender and no guarding  Extremities : 1+edema, no cyanosis and no redness  Neurologic :   Unable to assess.     Laboratory Data :     Albumin Albumin   Date Value Ref Range Status   12/15/2021 3.89 3.50 - 5.20 g/dL Final   12/15/2021 3.17 (L) 3.50 - 5.20 g/dL Final   12/15/2021 3.17 (L) 3.50 - 5.20 g/dL Final   12/14/2021 3.10 (L) 3.50 - 5.20 g/dL Final   12/14/2021 2.49 (L) 3.50 - 5.20 g/dL Final      Magnesium Magnesium   Date Value Ref Range Status   12/15/2021 3.1 (H) 1.6 - 2.6 mg/dL Final          PTH               No results found for: PTH    CBC and coagulation:  Results from last 7 days   Lab Units 12/16/21  0535 12/15/21  2351 12/15/21  1755 12/15/21  1725 12/15/21  1145 12/15/21  1145 12/15/21  0718 12/15/21  0534 12/14/21  0401 12/13/21  0356 12/12/21  0057 12/12/21  0057 12/11/21  0444 12/10/21  2251 12/10/21  0044 12/09/21 2026   LACTATE mmol/L  --   --   --   --   --   --   --   --   --   --   --  3.9*  --   --   --  3.6*   CRP mg/dL  --   --   --   --   --   --   --   --   --  16.36*  --  22.87*  --  27.13*   < >  --    WBC 10*3/mm3 24.88* 25.33* 25.46*  --    < > 26.77*  --    < >   < > 26.58*   < > 27.26*  --  31.70*   < >  --    HEMOGLOBIN g/dL 9.1* 9.4* 9.4*  --     < > 9.8*  --    < >   < > 10.0*   < > 10.5*  --  9.8*   < >  --    HEMATOCRIT % 28.9* 29.7* 29.9*  --    < > 31.1*  --    < >   < > 32.1*   < > 34.7  --  33.7*   < >  --    MCV fL 76.3* 76.2* 76.5*  --    < > 76.8*  --    < >   < > 78.1*   < > 81.6  --  84.0   < >  --    MCHC g/dL 31.5 31.6 31.4*  --    < > 31.5  --    < >   < > 31.2*   < > 30.3*  --  29.1*   < >  --    PLATELETS 10*3/mm3 29* 32* 40*  --    < > 18*  --    < >   < > 134*   < > 198  --  256   < >  --    INR  2.70* 3.86*  --  3.00*   < > 3.47*  --    < >   < >  --   --  3.62*   < >  --   --   --    D DIMER QUANT MCGFEU/mL >20.00* >20.00*  --  >20.00*  --  >20.00* >20.00*  --   --   --   --   --   --   --   --   --     < > = values in this interval not displayed.     Acid/base balance:  Results from last 7 days   Lab Units 12/16/21  0244 12/13/21  0431 12/11/21  1944   PH, ARTERIAL pH units 7.410 7.395 7.323*   PO2 ART mm Hg 57.1* 94.3 74.6*   PCO2, ARTERIAL mm Hg 38.0 42.0 43.8   HCO3 ART mmol/L 24.0 25.7 22.7     Renal and electrolytes:  Results from last 7 days   Lab Units 12/16/21  0535 12/15/21  2351 12/15/21  0534 12/14/21 2015 12/14/21 2015 12/14/21  0401 12/14/21  0401 12/11/21  0124 12/10/21  2251 12/10/21  0044   SODIUM mmol/L 136 135* 134*  134*  --  133*  --  132*   < >  --  131*   POTASSIUM mmol/L 5.6* 6.1* 5.2  5.2   < > 4.9   < > 5.1   < >  --  4.4   MAGNESIUM mg/dL  --   --  3.1*  --   --   --   --   --  2.5 2.1   CHLORIDE mmol/L 96* 96* 98  98   < > 98   < > 98   < >  --  98   CO2 mmol/L 22.0 16.7* 22.2  22.2   < > 21.4*   < > 19.1*   < >  --  16.5*   BUN mg/dL 122* 124* 111*  111*   < > 103*   < > 95*   < >  --  23*   CREATININE mg/dL 3.31* 3.15* 3.35*  3.35*  --  3.20*  --  3.12*   < >  --  1.05*   EGFR IF NONAFRICN AM mL/min/1.73 15* 16* 15*  15*   < > 16*   < > 16*   < >  --  57*   CALCIUM mg/dL 9.4 9.0 8.4*  8.4*   < > 8.1*   < > 7.4*   < >  --  8.4*   PHOSPHORUS mg/dL  --  7.0* 6.5*  --  6.1*  --   --   --   --   --      < > = values in this interval not displayed.     Estimated Creatinine Clearance: 31.3 mL/min (A) (by C-G formula based on SCr of 3.31 mg/dL (H)).    Liver and pancreatic function:  Results from last 7 days   Lab Units 12/15/21  2351 12/15/21  0534 12/14/21 2015 12/14/21 0401 12/14/21 0401 12/13/21 0356 12/13/21 0356 12/12/21 0057 12/11/21 1903 12/11/21 1008 12/11/21  0445   ALBUMIN g/dL 3.89 3.17*  3.17* 3.10*   < > 2.49*   < > 2.76*   < >  --    < >  --    BILIRUBIN mg/dL  --  6.3*  --   --  4.7*  --  5.5*   < >  --    < >  --    ALK PHOS U/L  --  185*  --   --  182*  --  192*   < >  --    < >  --    AST (SGOT) U/L  --  337*  --   --  661*  --  1,567*   < >  --    < >  --    ALT (SGPT) U/L  --  775*  --   --  1,101*  --  1,456*   < >  --    < >  --    AMMONIA umol/L  --   --   --   --   --   --   --   --  103*  --   --    AMYLASE U/L  --   --   --   --   --   --  226*  --   --   --   --    LIPASE U/L  --   --   --   --   --   --  225*  --   --   --  540*    < > = values in this interval not displayed.         Cardiac:      Liver and pancreatic function:  Results from last 7 days   Lab Units 12/15/21  2351 12/15/21  0534 12/14/21  2015 12/14/21  0401 12/14/21  0401 12/13/21  0356 12/13/21  0356 12/12/21  0057 12/11/21  1903 12/11/21  1008 12/11/21  0445   ALBUMIN g/dL 3.89 3.17*  3.17* 3.10*   < > 2.49*   < > 2.76*   < >  --    < >  --    BILIRUBIN mg/dL  --  6.3*  --   --  4.7*  --  5.5*   < >  --    < >  --    ALK PHOS U/L  --  185*  --   --  182*  --  192*   < >  --    < >  --    AST (SGOT) U/L  --  337*  --   --  661*  --  1,567*   < >  --    < >  --    ALT (SGPT) U/L  --  775*  --   --  1,101*  --  1,456*   < >  --    < >  --    AMMONIA umol/L  --   --   --   --   --   --   --   --  103*  --   --    AMYLASE U/L  --   --   --   --   --   --  226*  --   --   --   --    LIPASE U/L  --   --   --   --   --   --  225*  --   --   --  540*    < > = values in this interval not displayed.       Medications :      albumin human, 25 g, Intravenous, TID  budesonide-formoterol, 2 puff, Inhalation, BID - RT  lactulose, 30 g, Oral, TID  methylPREDNISolone sodium succinate, 20 mg, Intravenous, Q24H  pantoprazole, 40 mg, Oral, Q AM  sodium chloride, 10 mL, Intravenous, Q12H  sodium chloride, 10 mL, Intravenous, Q12H      DOBUTamine, 2-20 mcg/kg/min, Last Rate: Stopped (12/15/21 1246)  norepinephrine, 0.02-0.3 mcg/kg/min, Last Rate: Stopped (12/12/21 1643)  Pharmacy Consult,         Assessment/Plan     1.  Acute renal failure with acute tubular necrosis with oliguria  2.  Hypoxic respiratory failure  3.  Cardiogenic shock  4.  Shock liver  5.  Pulmonary embolism  6.  Pneumonia with sepsis  7.  Left ventricular ejection fraction 20%  8.  Atrial fibrillation now with controlled ventricular rate  9.  Hyperkalemia  10. Metabolic encephalopathy  11. Hyponatremia likely hypervolumia    Respiratory status is worse clinically today and has more lower ext swelling. Cr is grossly stable with adequate urine output.   Haptoglobin, peripheral blood smear is pending, no improvement in thrombocytopenia, although HH is stable.   Although there is no statistically significance of diuretics in ATN, will go ahead and give dose of 80mg after IV albumin to get some volume off loading.   Lokelma two doses today for hyperkalemia    Worsening thrombocytopenia with well stable HH likely due to DIC, nevertheless, smoldering TMA will result in kidney damage without significant anemia  Pt's condition is critical overall and high risk to require dialysis.       Kirstin Esparza MD  12/16/21  07:32 EST

## 2021-12-16 NOTE — PROGRESS NOTES
Dr. NEDA Murphy    [facility] CRITICAL CARE    12/16/2021    Patient ID:  Name:  Tiera Mendoza  MRN:  [id number]  1975  46 y.o.  female            CC/Reason for visit: Pulmonary embolism, severe sepsis, a flutter     Interval hx: Patient is more awake  but remains confused.  Now back on 2 L of nasal cannula oxygen requirements.  Developed some audible gurgling and respiratory secretions earlier.     ROS:   Confused    Vitals:  Vitals:    12/16/21 1132 12/16/21 1202 12/16/21 1232 12/16/21 1302   BP: 116/82 102/91 120/85 119/96   Pulse: 116 112 106 112   Resp:   17    Temp:  98.1 °F (36.7 °C)     TempSrc:  Axillary     SpO2: 94% 96% 92% 95%   Weight:       Height:               Body mass index is 45.37 kg/m².    Intake/Output Summary (Last 24 hours) at 12/16/2021 1432  Last data filed at 12/16/2021 1403  Gross per 24 hour   Intake 370.55 ml   Output 1525 ml   Net -1154.45 ml       Exam:  GEN:  Obese  Confused  Opens eyes but does not answer my questions  LUNGS: Few rhonchi sounds bilat, no use of accessory muscles  CV:  Normal S1S2, without murmur, 2+ leg edema  ABD:  Non tender, obesity hampers the exam      Scheduled meds:  albumin human, 25 g, Intravenous, TID  budesonide-formoterol, 2 puff, Inhalation, BID - RT  furosemide, 60 mg, Intravenous, Once  lactulose, 30 g, Oral, TID  methylPREDNISolone sodium succinate, 20 mg, Intravenous, Q24H  pantoprazole, 40 mg, Oral, Q AM  sodium chloride, 10 mL, Intravenous, Q12H  sodium chloride, 10 mL, Intravenous, Q12H  sodium zirconium cyclosilicate, 10 g, Oral, BID      IV meds:                      DOBUTamine, 2-20 mcg/kg/min, Last Rate: Stopped (12/15/21 1246)  norepinephrine, 0.02-0.3 mcg/kg/min, Last Rate: Stopped (12/12/21 1643)  Pharmacy Consult,         Data Review:   I reviewed the patient's medications and new clinical results.    COVID19   Date Value Ref Range Status   12/09/2021 Not Detected Not Detected - Ref. Range Final         Lab Results    Component Value Date    CALCIUM 9.1 12/16/2021    CALCIUM 9.1 12/16/2021    PHOS 7.2 (H) 12/16/2021    MG 3.1 (H) 12/15/2021    MG 2.5 12/10/2021    MG 2.1 12/10/2021     Results from last 7 days   Lab Units 12/16/21  1149 12/16/21  0739 12/16/21  0535 12/15/21  2351 12/15/21  1145 12/15/21  0534 12/14/21 2015 12/14/21  0401   SODIUM mmol/L  --  138  138 136 135*  --  134*  134*   < > 132*   POTASSIUM mmol/L  --  6.1*  6.1* 5.6* 6.1*  --  5.2  5.2   < > 5.1   CHLORIDE mmol/L  --  97*  97* 96* 96*  --  98  98   < > 98   CO2 mmol/L  --  20.6*  20.6* 22.0 16.7*  --  22.2  22.2   < > 19.1*   BUN mg/dL  --  126*  126* 122* 124*  --  111*  111*   < > 95*   CREATININE mg/dL  --  3.11*  3.11* 3.31* 3.15*  --  3.35*  3.35*   < > 3.12*   CALCIUM mg/dL  --  9.1  9.1 9.4 9.0  --  8.4*  8.4*   < > 7.4*   BILIRUBIN mg/dL 9.2* 9.3*  --   --   --  6.3*  --  4.7*   ALK PHOS U/L  --  146*  --   --   --  185*  --  182*   ALT (SGPT) U/L  --  480*  --   --   --  775*  --  1,101*   AST (SGOT) U/L  --  148*  --   --   --  337*  --  661*   GLUCOSE mg/dL  --  151*  148* 161* 134*  --  146*  146*   < > 150*   WBC 10*3/mm3 23.75*  --  24.88* 25.33*   < > 26.64*  --  27.78*   HEMOGLOBIN g/dL 9.5*  --  9.1* 9.4*   < > 9.9*  --  9.8*   PLATELETS 10*3/mm3 30*  --  29* 32*   < > 24*  --  101*   INR  2.84*  --  2.70* 3.86*   < > 2.68*  --  2.25*    < > = values in this interval not displayed.             Results from last 7 days   Lab Units 12/13/21  0356 12/12/21  0057 12/11/21  0445 12/11/21  0124 12/10/21  2251 12/10/21  0303   CK TOTAL U/L 859* 1,495* 2,338*  --    < >  --    TROPONIN T ng/mL  --   --  0.019 0.015  --  <0.010    < > = values in this interval not displayed.     Results from last 7 days   Lab Units 12/16/21  0244 12/13/21  0431 12/12/21  1409 12/12/21  1054 12/11/21  1944 12/11/21  1138 12/09/21  2129   PH, ARTERIAL pH units 7.410 7.395  --   --  7.323* 7.331* 7.423   PCO2, ARTERIAL mm Hg 38.0 42.0  --   --   43.8 37.3 33.3*   PO2 ART mm Hg 57.1* 94.3  --   --  74.6* 79.0* 88.4   O2 SATURATION ART % 88.0* 97.8  --   --  92.9* 94.5 97.8   FLOW RATE lpm 2.0 3.0 2.0  --  2.0 2.0  --    MODALITY  Nasal Cannula Nasal Cannula Nasal Cannula Nasal Cannula Nasal Cannula Nasal Cannula Nasal Cannula             ASSESSMENT:   Severe sepsis rule out  Cardiogenic shock with low ejection fraction  Pulmonary embolism  Pulmonary infiltrates  Hyponatremia  Hyperkalemia  Acute kidney injury  Atrial flutter  Elevated liver enzymes  Morbid obesity  History of amphetamine use  History of asthma  Right pleural effusion  Dysphagia      PLAN:  Again requiring low-flow oxygen.  She may need some trial of diuretics, however unlikely to respond to diuretics given her ATN.  Cardiology and nephrology are following along.  Nephrology is going to try diuretics.  She developed DIC criteria and is being seen by hematologist.  Platelets remain low.  The patient remains critically ill.  INR still elevated.  Remains on pressors and inotropes.          Junaid Murphy MD  12/16/2021

## 2021-12-16 NOTE — NURSING NOTE
[facility] called for an update, still no bed available. Stated the bed situation was looking worse not better.

## 2021-12-16 NOTE — PLAN OF CARE
Problem: Adult Inpatient Plan of Care  Goal: Absence of Hospital-Acquired Illness or Injury  Intervention: Prevent Skin Injury  Recent Flowsheet Documentation  Taken 12/16/2021 0915 by Lisha Fish, RN  Skin Protection: incontinence pads utilized   Goal Outcome Evaluation:

## 2021-12-16 NOTE — PROGRESS NOTES
LOS: 7 days     Name: Tiera Ridner  Age/Sex: 46 y.o. female  :  1975        PCP: Alan Martin APRN  REF: No Known Provider    Active Problems:    Single subsegmental pulmonary embolism without acute cor pulmonale (HCC)      Reason for follow-up: Atrial fibrillation and cardiomyopathy    Subjective       Subjective     46-year-old woman has been admitted with chief complaint of progressive shortness of breath for the last 3 months    Interval History: Potassium 6.1.  Creatinine stable at 3.11.  Platelets noted to be 30,000.  Patient is more awake remains confused.  Off of IV dobutamine. Limited echo pending.  Remains in atrial flutter    Vital Signs  Temp:  [97.1 °F (36.2 °C)-98.4 °F (36.9 °C)] 98.1 °F (36.7 °C)  Heart Rate:  [100-133] 112  Resp:  [16-23] 17  BP: ()/(70-98) 119/96     Vital Signs (last 72 hrs)        0700   0659  0700  12/15 0659 12/15 0700   0659  0700   1342   Most Recent      Temp (°F) 97.5 -  97.6    97.3 -  97.7    97.1 -  98.4      98.1     98.1 (36.7)  1202    Heart Rate 92 -  128    94 -  156    100 -  139    105 -  117     112  1302    Resp 13 -  20    14 -  16    16 -  24    17 -  18     17  1232    BP 84/77 -  141/103    79/47 -  193/151    90/70 -  146/112    102/91 -  120/85     119/96  1302    SpO2 (%) 89 -  100    88 -  100    88 -  100    92 -  100     95  1302        Documented weights    21 0155 12/09/21 2001 12/10/21 0500 21 0500   Weight: 127 kg (280 lb) 131 kg (288 lb 8 oz) 131 kg (288 lb 12.8 oz) 135 kg (298 lb 3.2 oz)    21 0500 21 1455 12/15/21 0410 21 0300   Weight: 132 kg (290 lb 11.2 oz) (!) 137 kg (301 lb 13 oz) 136 kg (298 lb 12.8 oz) 135 kg (298 lb 4.8 oz)    21 0540   Weight: 135 kg (298 lb 6.4 oz)      Body mass index is 45.37 kg/m².    Intake/Output Summary (Last 24 hours) at 2021 1342  Last data filed at 2021 1319  Gross per 24 hour   Intake  525.55 ml   Output 1525 ml   Net -999.45 ml     Objective    Objective       Physical Exam:     General Appearance:    Alert, cooperative, in no acute distress   Head:    Normocephalic, without obvious abnormality, atraumatic   Eyes:            Conjunctivae and sclerae normal, no   icterus, no pallor, corneas clear.   Neck:   No adenopathy, supple, trachea midline, no thyromegaly, no   carotid bruit, no JVD   Lungs:     Clear to auscultation,respirations regular, even and                  unlabored    Heart:    irregular rhythm and tachycardia, normal S1 and S2, no            murmur, no gallop, no rub, no click   Chest Wall:    No abnormalities observed   Abdomen:     Normal bowel sounds, no masses, no organomegaly, soft        non-tender, non-distended, no guarding, no rebound                tenderness   Extremities:   Moves all extremities well, no edema, no cyanosis, no             redness   Pulses:   Pulses palpable and equal bilaterally   Skin:   No bleeding, bruising or rash       Neurologic:   Alert with confusion      Results review       Results Review:   Results from last 7 days   Lab Units 12/16/21  1149 12/16/21  0535 12/15/21  2351 12/15/21  1755 12/15/21  1145 12/15/21  0534 12/14/21  0401   WBC 10*3/mm3 23.75* 24.88* 25.33* 25.46* 26.77* 26.64* 27.78*   HEMOGLOBIN g/dL 9.5* 9.1* 9.4* 9.4* 9.8* 9.9* 9.8*   PLATELETS 10*3/mm3 30* 29* 32* 40* 18* 24* 101*     Results from last 7 days   Lab Units 12/16/21  0739 12/16/21  0535 12/15/21  2351 12/15/21  0534 12/14/21  2015 12/14/21  0401 12/13/21  1216 12/13/21  0356 12/13/21  0356 12/12/21  2139 12/12/21  1736 12/12/21  1408 12/12/21  0910 12/12/21  0057 12/12/21  0057   SODIUM mmol/L 138  138 136 135* 134*  134* 133* 132* 130*   < > 131*   < > 127*   < > 128*   < > 131*   POTASSIUM mmol/L 6.1*  6.1* 5.6* 6.1* 5.2  5.2 4.9 5.1 5.3*   < > 5.6*   < > 5.3*   < > 6.6*   < > 5.7*   CHLORIDE mmol/L 97*  97* 96* 96* 98  98 98 98 97*   < > 97*   < > 95*   < >  95*   < > 96*   CO2 mmol/L 20.6*  20.6* 22.0 16.7* 22.2  22.2 21.4* 19.1* 21.8*   < > 22.0   < > 19.3*   < > 14.6*   < > 19.3*   BUN mg/dL 126*  126* 122* 124* 111*  111* 103* 95* 90*   < > 82*   < > 71*   < > 63*   < > 60*   CREATININE mg/dL 3.11*  3.11* 3.31* 3.15* 3.35*  3.35* 3.20* 3.12* 3.27*   < > 3.18*   < > 2.99*   < > 2.69*   < > 2.54*   CALCIUM mg/dL 9.1  9.1 9.4 9.0 8.4*  8.4* 8.1* 7.4* 7.3*   < > 7.4*   < > 7.5*   < > 8.0*   < > 8.0*   GLUCOSE mg/dL 151*  148* 161* 134* 146*  146* 155* 150* 171*   < > 155*   < > 165*   < > 113*   < > 144*   ALT (SGPT) U/L 480*  --   --  775*  --  1,101*  --   --  1,456*  --  1,577*  --  1,804*  --  1,835*   AST (SGOT) U/L 148*  --   --  337*  --  661*  --   --  1,567*  --  2,069*  --  2,462*  --  2,999*    < > = values in this interval not displayed.     Results from last 7 days   Lab Units 12/13/21  0356 12/12/21  0057 12/11/21  0445 12/11/21  0124 12/10/21  2251 12/10/21  0303 12/09/21  2132 12/09/21  1636   CK TOTAL U/L 859* 1,495* 2,338*  --  2,714*  --   --   --    TROPONIN T ng/mL  --   --  0.019 0.015  --  <0.010 <0.010 <0.010     Lab Results   Component Value Date    INR 2.84 (H) 12/16/2021    INR 2.70 (H) 12/16/2021    INR 3.86 (H) 12/15/2021    INR 3.00 (H) 12/15/2021    INR 3.47 (H) 12/15/2021    INR 2.68 (H) 12/15/2021    INR 2.25 (H) 12/14/2021     Lab Results   Component Value Date    MG 3.1 (H) 12/15/2021    MG 2.5 12/10/2021    MG 2.1 12/10/2021     Lab Results   Component Value Date    TSH 4.040 12/10/2021      Imaging Results (Last 48 Hours)     Procedure Component Value Units Date/Time    US Liver [[id number]] Collected: 12/16/21 1258     Updated: 12/16/21 1301    Narrative:      EXAM:    US Abdomen Limited, Right Upper Quadrant     EXAM DATE:    12/16/2021 12:21 PM     CLINICAL HISTORY:    DIC, elevated bilirubin, eval for cholodocholithiasis; I26.93-Single  subsegmental pulmonary embolism without acute cor pulmonale;  J18.9-Pneumonia,  unspecified organism; A41.9-Sepsis, unspecified  organism     TECHNIQUE:    Real-time ultrasound of the right upper quadrant with image  documentation.     COMPARISON:    No relevant prior studies available.     FINDINGS:    Liver:  Unremarkable.  No mass.  No intrahepatic bile duct dilation.    Gallbladder:  Gallbladder not well visualized on this study.    Common bile duct:  The CBD measures 6.72 mm.  No stones.  No dilation.    Pancreas:  Unremarkable as visualized.    Right kidney: Unremarkable.  No stones.  No solid mass.  No  hydronephrosis.       Impression:        No acute findings in the right upper quadrant.     This report was finalized on 12/16/2021 12:59 PM by Dr. Kalpesh Benavides MD.       XR Chest 1 View [[id number]] Collected: 12/16/21 1222     Updated: 12/16/21 1224    Narrative:      EXAM:    XR Chest, 1 View     EXAM DATE:    12/16/2021 11:34 AM     CLINICAL HISTORY:    hypoxia; I26.93-Single subsegmental pulmonary embolism without acute  cor pulmonale; J18.9-Pneumonia, unspecified organism; A41.9-Sepsis,  unspecified organism     TECHNIQUE:    Frontal view of the chest.     COMPARISON:    12/15/2021     FINDINGS:    LUNGS:  Vague bilateral airspace disease is again noted.    PLEURAL SPACE:  Small right and tiny left pleural effusion.  No  pneumothorax.    HEART:  Cardiomegaly.    MEDIASTINUM:  Unremarkable.    BONES/JOINTS:  Unremarkable.    TUBES, LINES AND DEVICES:  Right central line with tip in SVC.       Impression:      1.  Cardiomegaly.  2.  Small right and tiny left pleural effusion.  3.  Vague bilateral airspace disease is again noted.     This report was finalized on 12/16/2021 12:22 PM by Dr. Kalpesh Benavides MD.       XR Chest 1 View [[id number]] Collected: 12/15/21 1231     Updated: 12/15/21 1233    Narrative:      EXAM:    XR Chest, 1 View     EXAM DATE:    12/15/2021 10:57 AM     CLINICAL HISTORY:    follow up pneumonia; I26.93-Single subsegmental pulmonary embolism  without acute cor  pulmonale; J18.9-Pneumonia, unspecified organism;  A41.9-Sepsis, unspecified organism     TECHNIQUE:    Frontal view of the chest.     COMPARISON:    12/12/2021     FINDINGS:    LUNGS:  Improved aeration of the lung bases with persistent mild  bibasilar airspace opacity.    PLEURAL SPACE:  Probably tiny bilateral pleural effusions.  No  pneumothorax.    HEART:  Mild cardiomegaly again noted.    MEDIASTINUM:  Unremarkable.    BONES/JOINTS:  Unremarkable.    TUBES, LINES AND DEVICES:  Right central line with tip in SVC again  noted.       Impression:      1.  Improved aeration of the lung bases with persistent mild bibasilar  airspace opacity.  2.  Probably tiny bilateral pleural effusions.     This report was finalized on 12/15/2021 12:31 PM by Dr. Kalpesh Benavides MD.           Echo   Results for orders placed during the hospital encounter of 12/09/21    Adult Transthoracic Echo Complete W/ Cont if Necessary Per Protocol    Interpretation Summary  · The left ventricular cavity is moderately dilated.  · Left ventricular ejection fraction appears to be less than 20%. Left ventricular systolic function is severely decreased.  · The aortic valve is structurally normal with no regurgitation or stenosis present.  · The mitral valve is grossly normal in structure. Mild to moderate mitral valve regurgitation is present. No significant mitral valve stenosis is present.  · There is no evidence of pericardial effusion.     I reviewed the patient's new clinical results.    Telemetry: Atrial flutter 110 bpm     Medication Review:   albumin human, 25 g, Intravenous, TID  budesonide-formoterol, 2 puff, Inhalation, BID - RT  dextrose, 50 mL, Intravenous, Once   And  insulin regular, 10 Units, Intravenous, Once  furosemide, 60 mg, Intravenous, Once  lactulose, 30 g, Oral, TID  methylPREDNISolone sodium succinate, 20 mg, Intravenous, Q24H  pantoprazole, 40 mg, Oral, Q AM  sodium chloride, 10 mL, Intravenous, Q12H  sodium chloride, 10  mL, Intravenous, Q12H  sodium zirconium cyclosilicate, 10 g, Oral, BID        DOBUTamine, 2-20 mcg/kg/min, Last Rate: Stopped (12/15/21 1246)  norepinephrine, 0.02-0.3 mcg/kg/min, Last Rate: Stopped (12/12/21 1643)  Pharmacy Consult,         Assessment      Assessment:  1. Acute systolic heart failure with severe left ventricular systolic dysfunction EF less than 20%  2. Persistent atrial fibrillation/flutter  3. Acute pulmonary embolism  4. Shock liver  5. Acute renal failure secondary to poor perfusion  6. Acute hypoxic respiratory failure due to pneumonia PE and heart failure        Plan     Recommendations:  1. Patient now is off dobutamine  2. Nephrology is monitoring and adjusting diuretics and diuresis  3. Platelets is marginally better IV heparin is on hold  4. Pending transfer to the heart failure service at [facility]    I discussed the patients findings and my recommendations with patient and family      Electronically signed by PEDRO Adan, 12/16/21, 1:43 PM EST.  Electronically signed by Teresa Moya MD, 12/16/21, 4:12 PM EST.    Please note that portions of this note were completed with a voice recognition program.

## 2021-12-16 NOTE — PROGRESS NOTES
Date:  12/16/21    CC: Follow up of thrombocytopenia, DIC    Interval History: Ms. Mendoza is resting in bed this morning. She remains drowsy but oriented to self and place. She denies obvious bleeding from any source. Denies any specific complaints at this time.     Review of Systems  A comprehensive 14 point review of systems was performed.  Significant findings as mentioned above.  All other systems reviewed and are negative.     Objective     Vital Signs  Vitals:    12/16/21 0650 12/16/21 0700 12/16/21 0727 12/16/21 0738   BP: 122/85 118/97     Pulse: 115 117 109 114   Resp: 18  18    Temp:       TempSrc:       SpO2: 94% 95% 97% 96%   Weight:       Height:           Physical Exam:  General: Obese, oriented to self and place.  Drowsy. Jaundiced  Head: ATNC   Eyes: PERRL, No evidence of conjunctivitis. + scleral icterus  Nose: No nasal discharge.   Mouth: Oral mucosal membranes dry. No oral ulceration or hemorrhages.   Neck: Neck supple. No thyromegaly. No JVD.   Lungs: +scattered wheezes and Rhonchorous on the right; otherwise CTA  Heart: Tachycardia.  No murmurs, rubs, or gallops.   Abdomen: Soft. Bowel sounds are normoactive. Nontender with palpation.   Extremities: No cyanosis or edema.  Integumentary: Scattered bruising bilateral upper/lower extremities at sites of minor trauma, phlebotomy, IV sites.  No obvious bleeding.  :  Odom to BSD with scant dark yellow urine.  No hematuria  Neurologic: MS as above. Grossly non focal exam     Labs / Studies:  Lab Results   Component Value Date    WBC 24.88 (H) 12/16/2021    HGB 9.1 (L) 12/16/2021    HCT 28.9 (L) 12/16/2021    MCV 76.3 (L) 12/16/2021    RDW 19.7 (H) 12/16/2021    PLT 29 (C) 12/16/2021    NEUTRORELPCT 90.1 (H) 12/14/2021    LYMPHORELPCT 2.6 (L) 12/14/2021    MONORELPCT 5.1 12/14/2021    EOSRELPCT 0.6 12/14/2021    BASORELPCT 0.2 12/14/2021    NEUTROABS 23.44 (H) 12/15/2021    LYMPHSABS 0.73 12/14/2021       Lab Results   Component Value Date    NA  138 12/16/2021     12/16/2021    K 6.1 (C) 12/16/2021    K 6.1 (C) 12/16/2021    CO2 20.6 (L) 12/16/2021    CO2 20.6 (L) 12/16/2021    CL 97 (L) 12/16/2021    CL 97 (L) 12/16/2021     (H) 12/16/2021     (H) 12/16/2021    CREATININE 3.11 (H) 12/16/2021    CREATININE 3.11 (H) 12/16/2021    EGFRIFNONA 16 (L) 12/16/2021    EGFRIFNONA 16 (L) 12/16/2021    GLUCOSE 151 (H) 12/16/2021    GLUCOSE 148 (H) 12/16/2021    CALCIUM 9.1 12/16/2021    CALCIUM 9.1 12/16/2021    ALKPHOS 146 (H) 12/16/2021     (H) 12/16/2021     (H) 12/16/2021    BILITOT 9.3 (H) 12/16/2021    ALBUMIN 3.84 12/16/2021    ALBUMIN 3.84 12/16/2021    PROTEINTOT 7.5 12/16/2021    MG 3.1 (H) 12/15/2021    PHOS 7.2 (H) 12/16/2021       Imaging:  Adult Transthoracic Echo Complete W/ Cont if Necessary Per Protocol    Result Date: 12/9/2021  · The left ventricular cavity is moderately dilated. · Left ventricular ejection fraction appears to be less than 20%. Left ventricular systolic function is severely decreased. · The aortic valve is structurally normal with no regurgitation or stenosis present. · The mitral valve is grossly normal in structure. Mild to moderate mitral valve regurgitation is present. No significant mitral valve stenosis is present. · There is no evidence of pericardial effusion.      CT Abdomen Pelvis Without Contrast    Result Date: 12/11/2021  EXAM:   CT Abdomen and Pelvis Without Intravenous Contrast  EXAM DATE:   12/11/2021 11:18 AM  CLINICAL HISTORY:   CELSO, Increased LFTs; I26.93-Single subsegmental pulmonary embolism without acute cor pulmonale; J18.9-Pneumonia, unspecified organism; A41.9-Sepsis, unspecified organism  TECHNIQUE:   Axial computed tomography images of the abdomen and pelvis without intravenous contrast.  Sagittal and coronal reformatted images were created and reviewed.  This CT exam was performed using one or more of the following dose reduction techniques:  automated exposure control,  adjustment of the mA and/or kV according to patient size, and/or use of iterative reconstruction technique.  COMPARISON:   10/23/2018  FINDINGS:   Lung bases:  Partially consolidative airspace disease right greater than left lung bases.  Interstitial thickening and groundglass attenuation both lung bases.   Pleural space:  Small right pleural effusion.   Heart:  Marked cardiomegaly.   ABDOMEN:   Liver:  Liver cirrhosis and fatty changes of the liver are noted.   Gallbladder and bile ducts:  Gallbladder is incompletely distended. No calcified stones.  No ductal dilation.   Pancreas:  Unremarkable.  No ductal dilation.   Spleen:  Unremarkable.  No splenomegaly.   Adrenals:  Unremarkable.  No mass.   Kidneys and ureters:  Unremarkable.  No obstructing stones.  No hydronephrosis.   Stomach and bowel:  Gastric distention is noted.  No bowel obstruction.  No mucosal thickening.   PELVIS:   Appendix:  The appendix is not visualized.   Bladder:  Unremarkable.  No stones.   Reproductive:  Bilateral tubal ligation clips are noted.   ABDOMEN and PELVIS:   Intraperitoneal space:  Trace ascites.  No pneumoperitoneum identified.   Bones/joints:  Degenerative changes lumbar spine.  No acute fracture. No dislocation.   Soft tissues:  Anasarca noted diffusely.   Vasculature:  Unremarkable.  No abdominal aortic aneurysm.   Lymph nodes:  Unremarkable.  No enlarged lymph nodes.      1.  Bibasilar pneumonia and changes of CHF/edema. 2.  Small right pleural effusion and marked cardiomegaly. 3.  Diffuse anasarca. 4.  Liver cirrhosis and fatty changes of the liver. Miniscule ascites. 5.  Other nonacute findings as above.  This report was finalized on 12/11/2021 11:46 AM by Dr. Julius Arvizu MD.      US Liver    Result Date: 12/11/2021  US Liver Or Hepatic INDICATION: Elevated transaminase. History of pulmonary embolus. COMPARISON: None available. FINDINGS: LIVER: The echogenicity and echotexture of the hepatic parenchyma is within normal  limits. No hepatic mass. The intrahepatic bile ducts are normal in caliber. The common duct is normal in size at the talat hepatis measuring 2.1 mm.     Negative hepatic ultrasound. Signer Name: Sourav Carrillo MD  Signed: 12/11/2021 7:58 AM  Workstation Name: [email or web address]  [facility] Gallbladder    Result Date: 12/11/2021  EXAMINATION: US GALLBLADDER-   CLINICAL INDICATION:     Abd pain, elevated transaminases; I26.93-Single subsegmental pulmonary embolism without acute cor pulmonale; J18.9-Pneumonia, unspecified organism; A41.9-Sepsis, unspecified organism  TECHNIQUE: Multiplanar gray scale ultrasound of the right upper quadrant abdomen.  COMPARISON: CT same day  FINDINGS: Pancreatic bed: Not visualized. Gallbladder: Gallbladder is unremarkable. No shadowing stones or wall thickening. No pericholecystic fluid. Bile ducts: The CBD measures 4.40 mm. Liver: Liver cirrhosis. Fatty liver.  Fluid: No ascites demonstrated. Sonographic Sherman's sign: Technologist reports a negative sonographic Sherman sign.      1. Liver cirrhosis. 2. Unremarkable sonographic appearance of gallbladder.  This report was finalized on 12/11/2021 1:15 PM by Dr. Julius Arvizu MD.      XR Chest 1 View    Result Date: 12/15/2021  EXAM:   XR Chest, 1 View  EXAM DATE:   12/15/2021 10:57 AM  CLINICAL HISTORY:   follow up pneumonia; I26.93-Single subsegmental pulmonary embolism without acute cor pulmonale; J18.9-Pneumonia, unspecified organism; A41.9-Sepsis, unspecified organism  TECHNIQUE:   Frontal view of the chest.  COMPARISON:   12/12/2021  FINDINGS:   LUNGS:  Improved aeration of the lung bases with persistent mild bibasilar airspace opacity.   PLEURAL SPACE:  Probably tiny bilateral pleural effusions.  No pneumothorax.   HEART:  Mild cardiomegaly again noted.   MEDIASTINUM:  Unremarkable.   BONES/JOINTS:  Unremarkable.   TUBES, LINES AND DEVICES:  Right central line with tip in SVC again noted.      1.  Improved  aeration of the lung bases with persistent mild bibasilar airspace opacity. 2.  Probably tiny bilateral pleural effusions.  This report was finalized on 12/15/2021 12:31 PM by Dr. Kalpesh Benavides MD.      XR Chest 1 View    Result Date: 12/12/2021  EXAM:   XR Chest, 1 View  EXAM DATE:   12/12/2021 1:58 PM  CLINICAL HISTORY:   central line placement verification; I26.93-Single subsegmental pulmonary embolism without acute cor pulmonale; J18.9-Pneumonia, unspecified organism; A41.9-Sepsis, unspecified organism  TECHNIQUE:   Frontal view of the chest.  COMPARISON:   12/09/2021  FINDINGS:   Lungs:  Worsening by lateral airspace disease which may represent edema.   Pleural space:  No pneumothorax.  Small effusions.   Heart:  Cardiomegaly.   Mediastinum:  Unremarkable.   Bones/joints:  Unremarkable.   Tubes, lines and devices:  Right IJ deep line placement with tip at the cavoatrial junction.      1.  Right IJ deep line placement. No pneumothorax. 2.  Worsening changes of probable CHF/edema.  This report was finalized on 12/12/2021 2:37 PM by Dr. Julius Arvizu MD.      XR Chest 1 View    Result Date: 12/9/2021  CR Chest 1 Vw INDICATION: Shortness during cough, follow pneumonia COMPARISON:  220 FINDINGS: Portable AP view(s) of the chest. There Are low lung volumes with mild right base atelectasis or infiltrate. Left lung appears clear. Heart size normal. Bones are     Low inspiratory volumes with probable right lower lobe infiltrate or atelectasis Signer Name: Kevin Barrios MD  Signed: 12/9/2021 3:26 AM  Workstation [facility]: [email or web address]  [facility]    CT Chest Pulmonary Embolism    Result Date: 12/9/2021  CT CHEST PULMONARY EMBOLISM W CONTRAST INDICATION: Sepsis and recurrent pneumonia TECHNIQUE: CT angiogram of the chest with IV contrast. 3-D reconstructions were obtained and reviewed.   Radiation dose reduction techniques included automated exposure control or exposure modulation based on body size.  Count of known CT and cardiac nuc med studies performed in previous 12 months: 0. COMPARISON: None available. FINDINGS: There is adequate opacification of pulmonary arteries. There is right lower lobe pulmonary embolus which is occlusive bases. There is dense consolidation throughout the right lower lobe. There is mild interstitial prominence throughout the lungs there are some patchy areas of groundglass density in the upper lobes. Aorta is normal in size. There is a small right effusion. There is no adenopathy.     Right lower lobe pulmonary embolus which appears occlusive in the lower branches. There is no evidence of pulmonary infarct. Dense groundglass infiltrate throughout the right lower lobe Patchy groundglass infiltrates throughout the rest the lungs with diffuse interstitial prominence. There is no evidence of right heart enlargement I discussed these results with Dr. Naranjo at the time of this dictation Signer Name: Kevin Barrios MD  Signed: 12/9/2021 4:09 AM  Workstation Name: [facility]  [facility]    US Venous Doppler Lower Extremity Bilateral (duplex)    Result Date: 12/11/2021  US Veins LE Duplex BILAT HISTORY: Pulmonary embolus. Looking for source. TECHNIQUE: Real-time ultrasound was performed of both lower extremities utilizing spectral and color Doppler with compression and augmentation techniques. COMPARISON: None available. FINDINGS: Right Lower Extremity: There is no deep venous thrombus seen in the right lower extremity, including the right common femoral veins, right femoral veins and right popliteal veins.  Normal compressibility and respiratory phasicity was visualized.  No calf vein thrombus. Greater saphenous vein is patent. Left Lower Extremity: There is no deep venous thrombus seen in the left lower extremity, including the left common femoral veins, left femoral veins and left popliteal veins.   Normal compressibility and respiratory phasicity was visualized.  No  calf vein thrombus. Greater saphenous vein is patent.     No deep venous thrombus seen in either lower extremity. Signer Name: Sourav Carrillo MD  Signed: 12/11/2021 7:56 AM  Workstation Name: [email or web address]-  [facility] [place]     Assessment & Plan:  Tiera Mendoza is a 46 y.o.  female with     1.  Thrombocytopenia, likely DIC with acute RLL PE:  -On admission, noted normal platelet count of ~318,000. UDS was also positive for methamphetamine. Her platelets started trending down and significantly dropped over the past three days from ~134,000 to ~24,000 and currently ~29,000.   -Unfortunately, this is a very complicated situation with multiple competing priorties and mutliple failing organ systems with cardiogenic shock, shock liver, acute renal failure and DIC. She is at risk of continued life threatening clotting with recent PE and is also at risk for life threatening bleeding given coaguloapathy and thrombocytopenia. In terms of her pulmonary embolism and thrombocytopenia, it is likely that both are related to DIC.  She could have HIT, but this wouldn't explain other findings including hypofibrinogemia.  She is not a candidate for alternative anticoagulation such as argatroban or lepirudin given abnormal liver and renal function.  She is also at this point in time not a good candidate for  Heparin despite her PE because currently her plts are well less than 50 and she is coagulopathic.  This could be in part related to recent heparin use but could also be related to combination of liver failure and DIC.  DIC possibly secondary to methamphetamine use along with sepsis. At present, she has no obvious bleeding from any source.  -She received 10 units of cryoprecipitate yesterday as recommended and Fibrinogen is now >100.   -Suggest to transfuse platelets for platelets <50,000 prior to procedures or any active bleeding. Otherwise, transfuse for platelet count 10-20,000.  -Recommend to check CBC, PT/INR,  PTT and Fibrinogen q6 hours.  -Checked Factor 5, 7, 8 to further evaluate which are pending.  Await HIT Ab/DORIAN though again these wouldn't affect our management at this current time.  -Will closely monitor. Dr. Olson discussed with Dr. Barclay as well.     -Prognosis is very guarded.  Agree with transfer to higher level of care if possible.     Thank you so much for allowing us to participate in the care of Ms. Mendoza. Please do not hesitate to contact Hem/onc with any questions/concerns.       Electronically signed by: PEDRO Centeno , December 16, 2021 09:23 EST

## 2021-12-17 NOTE — PROGRESS NOTES
Dr. NEDA Murphy    [facility] CRITICAL CARE    12/17/2021    Patient ID:  Name:  Tiera Mendoza  MRN:  [id number]  1975  46 y.o.  female            CC/Reason for visit: Pulmonary embolism, severe sepsis, a flutter     Interval hx: Patient is a little more talkative today, still disoriented and amnesic as to recent events.  Asked if she could receive more food.  She correctly identified the program on TV.  She gave me her name.  She would like to take a bath.  On room air today.     ROS:   Denies chest pain or abdominal pain    Vitals:  Vitals:    12/17/21 0902 12/17/21 1002 12/17/21 1016 12/17/21 1200   BP: 117/93 109/90     Pulse: (!) 131 (!) 130     Resp:       Temp:    98.9 °F (37.2 °C)   TempSrc:    Axillary   SpO2:  96% 95%    Weight:       Height:               Body mass index is 44.58 kg/m².    Intake/Output Summary (Last 24 hours) at 12/17/2021 1245  Last data filed at 12/17/2021 0854  Gross per 24 hour   Intake 733 ml   Output 2450 ml   Net -1717 ml       Exam:  GEN:  No distress  Alert, oriented x 2.   LUNGS: A few coarse  breath sounds bilat, no use of accessory muscles  CV:  Normal S1S2, without murmur, 1+ leg edema  ABD:  Non tender, morbid obesity hampers rest of the exam      Scheduled meds:  albumin human, 25 g, Intravenous, TID  budesonide-formoterol, 2 puff, Inhalation, BID - RT  furosemide, 80 mg, Intravenous, Q12H  lactulose, 20 g, Oral, Once  lactulose, 30 g, Oral, TID  nystatin, 5 mL, Swish & Swallow, 4x Daily  pantoprazole, 40 mg, Oral, Q AM  sodium chloride, 10 mL, Intravenous, Q12H  sodium chloride, 10 mL, Intravenous, Q12H  sodium polystyrene, 30 g, Oral, Once      IV meds:                      DOBUTamine, 2-20 mcg/kg/min, Last Rate: Stopped (12/15/21 1246)  norepinephrine, 0.02-0.3 mcg/kg/min, Last Rate: Stopped (12/12/21 1643)  Pharmacy Consult,         Data Review:   I reviewed the patient's medications and new clinical results.    COVID19   Date Value Ref Range Status    12/09/2021 Not Detected Not Detected - Ref. Range Final         Lab Results   Component Value Date    CALCIUM 9.3 12/17/2021    PHOS 7.2 (H) 12/16/2021    MG 3.1 (H) 12/15/2021    MG 2.5 12/10/2021    MG 2.1 12/10/2021     Results from last 7 days   Lab Units 12/17/21  1149 12/17/21  0551 12/17/21  0550 12/17/21  0054 12/16/21  1734 12/16/21  1149 12/16/21  0739 12/16/21  0535 12/15/21  1145 12/15/21  0534   SODIUM mmol/L 138  --   --  139  --   --  138  138  --    < > 134*  134*   POTASSIUM mmol/L 6.2*  --   --  6.1*  --   --  6.1*  6.1*  --    < > 5.2  5.2   CHLORIDE mmol/L 97*  --   --  98  --   --  97*  97*  --    < > 98  98   CO2 mmol/L 16.1*  --   --  16.7*  --   --  20.6*  20.6*  --    < > 22.2  22.2   BUN mg/dL 122*  --   --  122*  --   --  126*  126*  --    < > 111*  111*   CREATININE mg/dL 2.71*  --   --  2.86*  --   --  3.11*  3.11*  --    < > 3.35*  3.35*   CALCIUM mg/dL 9.3  --   --  9.0  --   --  9.1  9.1  --    < > 8.4*  8.4*   BILIRUBIN mg/dL  --   --   --  8.1*  --  9.2* 9.3*  --   --  6.3*   ALK PHOS U/L  --   --   --  198*  --   --  146*  --   --  185*   ALT (SGPT) U/L  --   --   --  349*  --   --  480*  --   --  775*   AST (SGOT) U/L  --   --   --  105*  --   --  148*  --   --  337*   GLUCOSE mg/dL 110*  --   --  128*  --   --  151*  148*  --    < > 146*  146*   WBC 10*3/mm3 23.56*  --  23.23* 22.98*   < > 23.75*  --    < >   < > 26.64*   HEMOGLOBIN g/dL 9.1*  --  8.9* 8.9*   < > 9.5*  --    < >   < > 9.9*   PLATELETS 10*3/mm3 25*  --  23* 25*   < > 30*  --    < >   < > 24*   INR  3.18* 2.85*  --  2.05*   < > 2.84*  --    < >   < > 2.68*    < > = values in this interval not displayed.             Results from last 7 days   Lab Units 12/13/21  0356 12/12/21  0057 12/11/21  0445 12/11/21  0124 12/10/21  2251   CK TOTAL U/L 859* 1,495* 2,338*  --    < >   TROPONIN T ng/mL  --   --  0.019 0.015  --     < > = values in this interval not displayed.     Results from last 7 days   Lab  Units 12/16/21  0244 12/13/21  0431 12/12/21  1409 12/12/21  1054 12/11/21  1944 12/11/21  1138   PH, ARTERIAL pH units 7.410 7.395  --   --  7.323* 7.331*   PCO2, ARTERIAL mm Hg 38.0 42.0  --   --  43.8 37.3   PO2 ART mm Hg 57.1* 94.3  --   --  74.6* 79.0*   O2 SATURATION ART % 88.0* 97.8  --   --  92.9* 94.5   FLOW RATE lpm 2.0 3.0 2.0  --  2.0 2.0   MODALITY  Nasal Cannula Nasal Cannula Nasal Cannula Nasal Cannula Nasal Cannula Nasal Cannula             ASSESSMENT:   Severe sepsis rule out  Cardiogenic shock with low ejection fraction  Pulmonary embolism  Pulmonary infiltrates  Hyponatremia  Hyperkalemia  Acute kidney injury  Atrial flutter  Elevated liver enzymes  Morbid obesity  History of amphetamine use  History of asthma  Right pleural effusion  Dysphagia      PLAN:  Patient has been weaned off of oxygen.  Responded to diuretics, apparently 2-1/2 L of diuresis.  Followed by cardiology and nephrology.  I would recommend resuming anticoagulation for pulmonary embolism within the next 48 to 72 hours, if we have laboratory evidence of consistently improving and decreasing levels of liver enzymes, fibrinogen, D-dimer, LDH and INR.  Discussed with Dr. Barclay.  Not much else to offer from respiratory standpoint.  Patient has poor cardiac function and renal function, however not a candidate for transfer to another facility due to her underlying illicit drug abuse.  Prognosis remains quite guarded.  Still dependent on IV inotropes/IV pressors.        Junaid Murphy MD  12/17/2021

## 2021-12-17 NOTE — PLAN OF CARE
Problem: Adult Inpatient Plan of Care  Goal: Plan of Care Review  Outcome: Ongoing, Not Progressing  Flowsheets (Taken 12/17/2021 0508)  Progress: no change  Plan of Care Reviewed With:   patient   family  Outcome Summary: pt remains on no gtt, on room air with 2.5 L nc at times. no new issues overnight  Goal: Patient-Specific Goal (Individualized)  Outcome: Ongoing, Not Progressing  Goal: Absence of Hospital-Acquired Illness or Injury  Outcome: Ongoing, Not Progressing  Intervention: Identify and Manage Fall Risk  Description: Perform standard risk assessment with a validated tool or comprehensive approach appropriate to the patient on admission; reassess fall risk frequently, with change in status or transfer to another level of care.  Communicate fall injury risk to interprofessional healthcare team.  Determine need for increased observation, equipment and environmental modification, such as low bed and signage, as well as supportive, nonskid footwear.  Adjust safety measures to individual developmental age, stage and identified risk factors.  Reinforce the importance of safety and physical activity with patient and family.  Perform regular intentional rounding to assess need for position change, pain assessment, personal needs, including assistance with toileting.  Recent Flowsheet Documentation  Taken 12/17/2021 0400 by Shahram Chávez, RN  Safety Promotion/Fall Prevention:   safety round/check completed   fall prevention program maintained  Taken 12/17/2021 0300 by Shahram Chávez, RN  Safety Promotion/Fall Prevention:   safety round/check completed   fall prevention program maintained  Taken 12/17/2021 0100 by Shahram Chávez, RN  Safety Promotion/Fall Prevention:   fall prevention program maintained   safety round/check completed  Taken 12/16/2021 2300 by Shahram Chávez, RN  Safety Promotion/Fall Prevention:   safety round/check completed   fall prevention program maintained  Taken 12/16/2021 2200  by Chávez, Shahram C, RN  Safety Promotion/Fall Prevention:   safety round/check completed   fall prevention program maintained  Taken 12/16/2021 2100 by Shahram Chávez RN  Safety Promotion/Fall Prevention:   safety round/check completed   fall prevention program maintained  Taken 12/16/2021 1900 by Shahram Chávez RN  Safety Promotion/Fall Prevention:   safety round/check completed   fall prevention program maintained  Intervention: Prevent Skin Injury  Description: Assess skin risk on admission and at regular intervals throughout hospital stay.  Keep all areas of skin (especially folds) clean and dry.  Maintain adequate skin hydration.  Relieve and redistribute pressure and protect bony prominences; implement measures based on patient-specific risk factors.  Match turning and repositioning schedule to clinical condition.  Encourage weight shift frequently; assist with reposition if unable to complete independently.  Float heels off bed. Avoid pressure on the Achilles tendon.  Keep skin free from extended contact with medical devices.  Use aids (e.g., slide boards, mechanical lift) during transfer.  Recent Flowsheet Documentation  Taken 12/17/2021 0400 by Shahram Chávez, RN  Body Position: (encouraged pt to turn) position changed independently  Taken 12/17/2021 0200 by Shahram Chávez RN  Skin Protection: incontinence pads utilized  Taken 12/16/2021 2200 by Shahram Chávez RN  Body Position: (encouraged to turn) position changed independently  Goal: Optimal Comfort and Wellbeing  Outcome: Ongoing, Not Progressing  Intervention: Provide Person-Centered Care  Description: Use a family-focused approach to care.  Develop trust and rapport by proactively providing information, encouraging questions, addressing concerns and offering reassurance.  Acknowledge emotional response to hospitalization.  Recognize and utilize personal coping strategies.  Honor spiritual and cultural preferences.  Recent  Flowsheet Documentation  Taken 12/16/2021 2000 by Shahram Chávez, RN  Trust Relationship/Rapport:   care explained   reassurance provided   questions answered   thoughts/feelings acknowledged  Goal: Readiness for Transition of Care  Outcome: Ongoing, Not Progressing   Goal Outcome Evaluation:  Plan of Care Reviewed With: patient, family        Progress: no change  Outcome Summary: pt remains on no gtt, on room air with 2.5 L nc at times. no new issues overnight

## 2021-12-17 NOTE — CASE MANAGEMENT/SOCIAL WORK
Discharge Planning Assessment   Peter     Patient Name: Tiera Mendoza  MRN: [id number]  Today's Date: 12/17/2021    Admit Date: 12/9/2021     Discharge Plan     Row Name 12/17/21 1357       Plan    Plan Pt admitted 12/9/21. Per MD note, pt has been taken off of [facility] waiting list and has a very poor prognosis with high risk for death. Discharge plan pending clinical improvement. SS to continue to follow and assist.              MNYOR Burrell

## 2021-12-17 NOTE — PROGRESS NOTES
Nephrology Progress Note      Subjective     Pt is more oriented today, sitting up on chair and was trying to eat food. Respiratory status is much better, she had about 1.7L net negative fluid balance.     Objective       Vital signs :     Temp:  [97 °F (36.1 °C)-98.1 °F (36.7 °C)] 97.6 °F (36.4 °C)  Heart Rate:  [106-135] 123  Resp:  [16-24] 22  BP: ()/() 107/91      Intake/Output Summary (Last 24 hours) at 12/17/2021 1018  Last data filed at 12/17/2021 0854  Gross per 24 hour   Intake 733 ml   Output 2450 ml   Net -1717 ml       Physical Exam:    General Appearance : not in acute distress  Lungs : B/L lower zone crackles  Heart :  regular rhythm & normal rate, normal S1, S2 and no murmur, no rub  Abdomen : normal bowel sounds, no masses, no hepatomegaly, no splenomegaly, soft non-tender and no guarding  Extremities : 1+edema, no cyanosis and no redness  Neurologic :   Unable to assess.     Laboratory Data :     Albumin Albumin   Date Value Ref Range Status   12/17/2021 4.22 3.50 - 5.20 g/dL Final   12/16/2021 3.84 3.50 - 5.20 g/dL Final   12/16/2021 3.84 3.50 - 5.20 g/dL Final   12/15/2021 3.89 3.50 - 5.20 g/dL Final   12/15/2021 3.17 (L) 3.50 - 5.20 g/dL Final   12/15/2021 3.17 (L) 3.50 - 5.20 g/dL Final   12/14/2021 3.10 (L) 3.50 - 5.20 g/dL Final      Magnesium Magnesium   Date Value Ref Range Status   12/15/2021 3.1 (H) 1.6 - 2.6 mg/dL Final          PTH               No results found for: PTH    CBC and coagulation:  Results from last 7 days   Lab Units 12/17/21  0551 12/17/21  0550 12/17/21  0054 12/16/21  1735 12/16/21  1734 12/16/21  1149 12/16/21  1149 12/16/21  0535 12/16/21  0535 12/15/21  2351 12/15/21  2351 12/15/21  1755 12/15/21  1725 12/14/21  0401 12/13/21  0356 12/12/21  0057 12/12/21  0057 12/11/21  3784 12/10/21  7620   LACTATE mmol/L  --   --   --   --   --   --   --   --   --   --   --   --   --   --   --   --  3.9*  --   --    CRP mg/dL  --   --   --   --   --   --   --   --   --    --   --   --   --   --  16.36*  --  22.87*  --  27.13*   WBC 10*3/mm3  --  23.23* 22.98* 21.41*  --    < > 23.75*   < > 24.88*   < > 25.33*   < >  --    < > 26.58*   < > 27.26*  --  31.70*   HEMOGLOBIN g/dL  --  8.9* 8.9* 8.9*  --    < > 9.5*   < > 9.1*   < > 9.4*   < >  --    < > 10.0*   < > 10.5*  --  9.8*   HEMATOCRIT %  --  28.9* 28.3* 29.2*  --    < > 30.2*   < > 28.9*   < > 29.7*   < >  --    < > 32.1*   < > 34.7  --  33.7*   MCV fL  --  77.5* 76.7* 77.7*  --    < > 77.2*   < > 76.3*   < > 76.2*   < >  --    < > 78.1*   < > 81.6  --  84.0   MCHC g/dL  --  30.8* 31.4* 30.5*  --    < > 31.5   < > 31.5   < > 31.6   < >  --    < > 31.2*   < > 30.3*  --  29.1*   PLATELETS 10*3/mm3  --  23* 25* 27*  --    < > 30*   < > 29*   < > 32*   < >  --    < > 134*   < > 198  --  256   INR  2.85*  --  2.05*  --  2.80*   < > 2.84*   < > 2.70*   < > 3.86*   < > 3.00*   < >  --   --  3.62*   < >  --    D DIMER QUANT MCGFEU/mL >20.00*  --  >20.00*  --  >20.00*  --  >20.00*  --  >20.00*  --  >20.00*  --  >20.00*   < >  --   --   --   --   --     < > = values in this interval not displayed.     Acid/base balance:  Results from last 7 days   Lab Units 12/16/21  0244 12/13/21  0431 12/11/21  1944   PH, ARTERIAL pH units 7.410 7.395 7.323*   PO2 ART mm Hg 57.1* 94.3 74.6*   PCO2, ARTERIAL mm Hg 38.0 42.0 43.8   HCO3 ART mmol/L 24.0 25.7 22.7     Renal and electrolytes:  Results from last 7 days   Lab Units 12/17/21  0054 12/16/21  0739 12/16/21  0535 12/15/21  2351 12/15/21  2351 12/15/21  0534 12/15/21  0534 12/11/21  0124 12/10/21  2251   SODIUM mmol/L 139 138  138 136  --  135*  --  134*  134*   < >  --    POTASSIUM mmol/L 6.1* 6.1*  6.1* 5.6*   < > 6.1*   < > 5.2  5.2   < >  --    MAGNESIUM mg/dL  --   --   --   --   --   --  3.1*  --  2.5   CHLORIDE mmol/L 98 97*  97* 96*   < > 96*   < > 98  98   < >  --    CO2 mmol/L 16.7* 20.6*  20.6* 22.0   < > 16.7*   < > 22.2  22.2   < >  --    BUN mg/dL 122* 126*  126* 122*   < >  124*   < > 111*  111*   < >  --    CREATININE mg/dL 2.86* 3.11*  3.11* 3.31*  --  3.15*  --  3.35*  3.35*   < >  --    EGFR IF NONAFRICN AM mL/min/1.73 18* 16*  16* 15*   < > 16*   < > 15*  15*   < >  --    CALCIUM mg/dL 9.0 9.1  9.1 9.4   < > 9.0   < > 8.4*  8.4*   < >  --    PHOSPHORUS mg/dL  --  7.2*  --   --  7.0*  --  6.5*   < >  --     < > = values in this interval not displayed.     Estimated Creatinine Clearance: 35.9 mL/min (A) (by C-G formula based on SCr of 2.86 mg/dL (H)).    Liver and pancreatic function:  Results from last 7 days   Lab Units 12/17/21  0054 12/16/21  1149 12/16/21  0739 12/15/21  2351 12/15/21  0534 12/15/21  0534 12/14/21  0401 12/13/21  0356 12/12/21  0057 12/11/21  1903 12/11/21  1008 12/11/21  0445   ALBUMIN g/dL 4.22  --  3.84  3.84 3.89   < > 3.17*  3.17*   < > 2.76*   < >  --    < >  --    BILIRUBIN mg/dL 8.1* 9.2* 9.3*  --    < > 6.3*   < > 5.5*   < >  --    < >  --    ALK PHOS U/L 198*  --  146*  --   --  185*   < > 192*   < >  --    < >  --    AST (SGOT) U/L 105*  --  148*  --   --  337*   < > 1,567*   < >  --    < >  --    ALT (SGPT) U/L 349*  --  480*  --   --  775*   < > 1,456*   < >  --    < >  --    AMMONIA umol/L  --   --   --   --   --   --   --   --   --  103*  --   --    AMYLASE U/L  --   --   --   --   --   --   --  226*  --   --   --   --    LIPASE U/L  --   --   --   --   --   --   --  225*  --   --   --  540*    < > = values in this interval not displayed.         Cardiac:      Liver and pancreatic function:  Results from last 7 days   Lab Units 12/17/21  0054 12/16/21  1149 12/16/21  0739 12/15/21  2351 12/15/21  0534 12/15/21  0534 12/14/21  0401 12/13/21  0356 12/12/21  0057 12/11/21  1903 12/11/21  1008 12/11/21  0445   ALBUMIN g/dL 4.22  --  3.84  3.84 3.89   < > 3.17*  3.17*   < > 2.76*   < >  --    < >  --    BILIRUBIN mg/dL 8.1* 9.2* 9.3*  --    < > 6.3*   < > 5.5*   < >  --    < >  --    ALK PHOS U/L 198*  --  146*  --   --  185*   < > 192*   <  >  --    < >  --    AST (SGOT) U/L 105*  --  148*  --   --  337*   < > 1,567*   < >  --    < >  --    ALT (SGPT) U/L 349*  --  480*  --   --  775*   < > 1,456*   < >  --    < >  --    AMMONIA umol/L  --   --   --   --   --   --   --   --   --  103*  --   --    AMYLASE U/L  --   --   --   --   --   --   --  226*  --   --   --   --    LIPASE U/L  --   --   --   --   --   --   --  225*  --   --   --  540*    < > = values in this interval not displayed.       Medications :     albumin human, 25 g, Intravenous, TID  budesonide-formoterol, 2 puff, Inhalation, BID - RT  lactulose, 30 g, Oral, TID  nystatin, 5 mL, Swish & Swallow, 4x Daily  pantoprazole, 40 mg, Oral, Q AM  sodium chloride, 10 mL, Intravenous, Q12H  sodium chloride, 10 mL, Intravenous, Q12H      DOBUTamine, 2-20 mcg/kg/min, Last Rate: Stopped (12/15/21 1246)  norepinephrine, 0.02-0.3 mcg/kg/min, Last Rate: Stopped (12/12/21 1643)  Pharmacy Consult,         Assessment/Plan     1.  Acute renal failure with acute tubular necrosis with oliguria  2.  Hypoxic respiratory failure  3.  Cardiogenic shock  4.  Shock liver  5.  Pulmonary embolism  6.  Pneumonia with sepsis  7.  Left ventricular ejection fraction 20%  8.  Atrial fibrillation now with controlled ventricular rate  9.  Hyperkalemia  10. Metabolic encephalopathy  11. Hyponatremia likely hypervolumia    Pt had excellent diuresis response with lasix yesterday, clinically respiratory status is much better. Cr started trending down, having adequate blood pressure without pressors. Evaluated by heme, no concern of TMA, likely DIC.   Persistent mild hyperkalemia, will give kayexalate. I will give lasix today as well  Will repeat UPCR to follow up likely overestimated UPCR, get renal USG    CELSO likely ATN due to cardiogenic shock  Baseline Cr unknown, admitted with 1.0  No hematuria, 1.2 G proteinuria.   -Kayexylate 30G PO once  -repeat BMP in evening  -UPCR  -renal USG  -Lasix 80mg IV BID with  albumin        Kirstin Esparza MD  12/17/21  10:18 EST

## 2021-12-17 NOTE — PROGRESS NOTES
LOS: 8 days     Name: Tiera Ridner  Age/Sex: 46 y.o. female  :  1975        PCP: Alan Martin APRN  REF: No Known Provider    Principal Problem:    Single subsegmental pulmonary embolism without acute cor pulmonale (HCC)      Reason for follow-up: Atrial fibrillation and Cardiomyopathy     Subjective       Subjective     46-year-old woman has been admitted with chief complaint of progressive shortness of breath for the last 3 months    Interval History: Patient remains critically ill. Creatinine improved to 2.8.  Noted to be hyperkalemic. Platelets 23,000. Did have good urine output with IV diuresis.  Currently tachycardic with a heart rate in the 140s.     Vital Signs  Temp:  [97 °F (36.1 °C)-98.1 °F (36.7 °C)] 97.6 °F (36.4 °C)  Heart Rate:  [106-135] 123  Resp:  [16-24] 22  BP: ()/() 107/91     Vital Signs (last 72 hrs)        0700  12/15 0659 12/15 0700   0659  0700   0659  0700   0948   Most Recent      Temp (°F) 97.3 -  97.7    97.1 -  98.4    97 -  98.1      97.6     97.6 (36.4)  0800    Heart Rate 94 -  156    100 -  139    105 -  135       123  0659    Resp 14 -  16    16 -  24    16 -  24       22  0659    BP 79/47 -  193/151    90/70 -  146/112    96/73 -  142/103       107/91  0602    SpO2 (%) 88 -  100    88 -  100    90 -  100       95  0659        Documented weights    21 0155 12/09/21 2001 12/10/21 0500 21 0500   Weight: 127 kg (280 lb) 131 kg (288 lb 8 oz) 131 kg (288 lb 12.8 oz) 135 kg (298 lb 3.2 oz)    21 0500 21 1455 12/15/21 0410 21 0300   Weight: 132 kg (290 lb 11.2 oz) (!) 137 kg (301 lb 13 oz) 136 kg (298 lb 12.8 oz) 135 kg (298 lb 4.8 oz)    21 0540 21 0500   Weight: 135 kg (298 lb 6.4 oz) 133 kg (293 lb 3.2 oz)      Body mass index is 44.58 kg/m².    Intake/Output Summary (Last 24 hours) at 2021 0948  Last data filed at 2021 0854  Gross per 24 hour    Intake 733 ml   Output 2450 ml   Net -1717 ml     Objective    Objective       Physical Exam:     General Appearance:    Appears ill,  in no acute distress   Head:    Normocephalic, without obvious abnormality, atraumatic   Eyes:            Conjunctivae and sclerae normal, no   icterus, no pallor, corneas clear.   Neck:   No adenopathy, supple, trachea midline, no thyromegaly, no   carotid bruit, no JVD   Lungs:     Rales,respirations regular, even and   unlabored    Heart:    Regularly irregular rhythm and tachycardia, normal S1 and S2, no   murmur, no gallop, no rub, no click   Chest Wall:    No abnormalities observed   Abdomen:     Normal bowel sounds, no masses, no organomegaly, soft        non-tender, non-distended, no guarding, no rebound                tenderness   Extremities:   Moves all extremities well, no edema, no cyanosis, no             redness   Pulses:   Pulses palpable and equal bilaterally   Skin:   No bleeding, bruising or rash       Neurologic:   Alert and oriented      Results review       Results Review:   Results from last 7 days   Lab Units 12/17/21  0550 12/17/21  0054 12/16/21  1735 12/16/21  1149 12/16/21  0535 12/15/21  2351 12/15/21  1755   WBC 10*3/mm3 23.23* 22.98* 21.41* 23.75* 24.88* 25.33* 25.46*   HEMOGLOBIN g/dL 8.9* 8.9* 8.9* 9.5* 9.1* 9.4* 9.4*   PLATELETS 10*3/mm3 23* 25* 27* 30* 29* 32* 40*     Results from last 7 days   Lab Units 12/17/21  0054 12/16/21  0739 12/16/21  0535 12/15/21  2351 12/15/21  0534 12/14/21  2015 12/14/21  0401 12/13/21  1216 12/13/21  0356 12/12/21  2139 12/12/21  1736 12/12/21  1408 12/12/21  0910   SODIUM mmol/L 139 138  138 136 135* 134*  134* 133* 132*   < > 131*   < > 127*   < > 128*   POTASSIUM mmol/L 6.1* 6.1*  6.1* 5.6* 6.1* 5.2  5.2 4.9 5.1   < > 5.6*   < > 5.3*   < > 6.6*   CHLORIDE mmol/L 98 97*  97* 96* 96* 98  98 98 98   < > 97*   < > 95*   < > 95*   CO2 mmol/L 16.7* 20.6*  20.6* 22.0 16.7* 22.2  22.2 21.4* 19.1*   < > 22.0   < >  19.3*   < > 14.6*   BUN mg/dL 122* 126*  126* 122* 124* 111*  111* 103* 95*   < > 82*   < > 71*   < > 63*   CREATININE mg/dL 2.86* 3.11*  3.11* 3.31* 3.15* 3.35*  3.35* 3.20* 3.12*   < > 3.18*   < > 2.99*   < > 2.69*   CALCIUM mg/dL 9.0 9.1  9.1 9.4 9.0 8.4*  8.4* 8.1* 7.4*   < > 7.4*   < > 7.5*   < > 8.0*   GLUCOSE mg/dL 128* 151*  148* 161* 134* 146*  146* 155* 150*   < > 155*   < > 165*   < > 113*   ALT (SGPT) U/L 349* 480*  --   --  775*  --  1,101*  --  1,456*  --  1,577*  --  1,804*   AST (SGOT) U/L 105* 148*  --   --  337*  --  661*  --  1,567*  --  2,069*  --  2,462*    < > = values in this interval not displayed.     Results from last 7 days   Lab Units 12/13/21  0356 12/12/21  0057 12/11/21  0445 12/11/21  0124 12/10/21  2251   CK TOTAL U/L 859* 1,495* 2,338*  --  2,714*   TROPONIN T ng/mL  --   --  0.019 0.015  --      Lab Results   Component Value Date    INR 2.85 (H) 12/17/2021    INR 2.05 (H) 12/17/2021    INR 2.80 (H) 12/16/2021    INR 2.84 (H) 12/16/2021    INR 2.70 (H) 12/16/2021    INR 3.86 (H) 12/15/2021    INR 3.00 (H) 12/15/2021     Lab Results   Component Value Date    MG 3.1 (H) 12/15/2021    MG 2.5 12/10/2021    MG 2.1 12/10/2021     Lab Results   Component Value Date    TSH 4.040 12/10/2021      Imaging Results (Last 48 Hours)     Procedure Component Value Units Date/Time    US Liver [[id number]] Collected: 12/16/21 1258     Updated: 12/16/21 1301    Narrative:      EXAM:    US Abdomen Limited, Right Upper Quadrant     EXAM DATE:    12/16/2021 12:21 PM     CLINICAL HISTORY:    DIC, elevated bilirubin, eval for cholodocholithiasis; I26.93-Single  subsegmental pulmonary embolism without acute cor pulmonale;  J18.9-Pneumonia, unspecified organism; A41.9-Sepsis, unspecified  organism     TECHNIQUE:    Real-time ultrasound of the right upper quadrant with image  documentation.     COMPARISON:    No relevant prior studies available.     FINDINGS:    Liver:  Unremarkable.  No mass.  No  intrahepatic bile duct dilation.    Gallbladder:  Gallbladder not well visualized on this study.    Common bile duct:  The CBD measures 6.72 mm.  No stones.  No dilation.    Pancreas:  Unremarkable as visualized.    Right kidney: Unremarkable.  No stones.  No solid mass.  No  hydronephrosis.       Impression:        No acute findings in the right upper quadrant.     This report was finalized on 12/16/2021 12:59 PM by Dr. Kalpesh Benavides MD.       XR Chest 1 View [[id number]] Collected: 12/16/21 1222     Updated: 12/16/21 1224    Narrative:      EXAM:    XR Chest, 1 View     EXAM DATE:    12/16/2021 11:34 AM     CLINICAL HISTORY:    hypoxia; I26.93-Single subsegmental pulmonary embolism without acute  cor pulmonale; J18.9-Pneumonia, unspecified organism; A41.9-Sepsis,  unspecified organism     TECHNIQUE:    Frontal view of the chest.     COMPARISON:    12/15/2021     FINDINGS:    LUNGS:  Vague bilateral airspace disease is again noted.    PLEURAL SPACE:  Small right and tiny left pleural effusion.  No  pneumothorax.    HEART:  Cardiomegaly.    MEDIASTINUM:  Unremarkable.    BONES/JOINTS:  Unremarkable.    TUBES, LINES AND DEVICES:  Right central line with tip in SVC.       Impression:      1.  Cardiomegaly.  2.  Small right and tiny left pleural effusion.  3.  Vague bilateral airspace disease is again noted.     This report was finalized on 12/16/2021 12:22 PM by Dr. Kalpesh Benavides MD.       XR Chest 1 View [[id number]] Collected: 12/15/21 1231     Updated: 12/15/21 1233    Narrative:      EXAM:    XR Chest, 1 View     EXAM DATE:    12/15/2021 10:57 AM     CLINICAL HISTORY:    follow up pneumonia; I26.93-Single subsegmental pulmonary embolism  without acute cor pulmonale; J18.9-Pneumonia, unspecified organism;  A41.9-Sepsis, unspecified organism     TECHNIQUE:    Frontal view of the chest.     COMPARISON:    12/12/2021     FINDINGS:    LUNGS:  Improved aeration of the lung bases with persistent mild  bibasilar airspace  opacity.    PLEURAL SPACE:  Probably tiny bilateral pleural effusions.  No  pneumothorax.    HEART:  Mild cardiomegaly again noted.    MEDIASTINUM:  Unremarkable.    BONES/JOINTS:  Unremarkable.    TUBES, LINES AND DEVICES:  Right central line with tip in SVC again  noted.       Impression:      1.  Improved aeration of the lung bases with persistent mild bibasilar  airspace opacity.  2.  Probably tiny bilateral pleural effusions.     This report was finalized on 12/15/2021 12:31 PM by Dr. Kalpesh Benavides MD.           Echo   Results for orders placed during the hospital encounter of 12/09/21    Adult Transthoracic Echo Limited W/ Cont if Necessary Per Protocol    Interpretation Summary  · The left ventricular cavity is moderately dilated.  · Left ventricular ejection fraction appears to be 21 - 25%. Left ventricular systolic function is severely decreased.  · The right ventricular cavity is mildly dilated.  · Moderately reduced right ventricular systolic function noted.  · Comments: LV systolic function is about the same as on the recent echo Doppler study.     I reviewed the patient's new clinical results.    Telemetry: Atrial flutter 140's     Medication Review:   albumin human, 25 g, Intravenous, TID  budesonide-formoterol, 2 puff, Inhalation, BID - RT  lactulose, 30 g, Oral, TID  nystatin, 5 mL, Swish & Swallow, 4x Daily  pantoprazole, 40 mg, Oral, Q AM  sodium chloride, 10 mL, Intravenous, Q12H  sodium chloride, 10 mL, Intravenous, Q12H        DOBUTamine, 2-20 mcg/kg/min, Last Rate: Stopped (12/15/21 1246)  norepinephrine, 0.02-0.3 mcg/kg/min, Last Rate: Stopped (12/12/21 1643)  Pharmacy Consult,         Assessment      Assessment:  1. Acute systolic heart failure with severe left ventricular systolic dysfunction EF less than 20%  2. Persistent atrial fibrillation/flutter  3. Acute pulmonary embolism  4. Shock liver  5. Acute renal failure secondary to poor perfusion  6. Acute hypoxic respiratory failure due to  pneumonia PE and heart failure        Plan     Recommendations:  1. Patient general condition is deteriorating with pulmonary edema we will try to restart dobutamine to assist diuresis  2. She is tachycardic with this is multifactorial she is off anticoagulation because of thrombocytopenia  3. Overall poor prognosis    I discussed the patients findings and my recommendations with patient and family    Electronically signed by PEDRO Adan, 12/17/21, 9:50 AM EST.  Electronically signed by Teresa Moya MD, 12/17/21, 5:37 PM EST.    Please note that portions of this note were completed with a voice recognition program.

## 2021-12-17 NOTE — PROGRESS NOTES
Date:  12/17/21    CC: Follow up of thrombocytopenia, DIC    Interval History: Ms. Mendoza is more alert today. She is oriented to self and place. She denies obvious bleeding from any source. She continues with shortness of breath. Denies CP. Denies any specific complaints at this time.     Review of Systems  A comprehensive 14 point review of systems was performed.  Significant findings as mentioned above.  All other systems reviewed and are negative.     Objective     Vital Signs  Vitals:    12/17/21 1002 12/17/21 1016 12/17/21 1200 12/17/21 1420   BP: 109/90   100/84   Pulse: (!) 130      Resp:    20   Temp:   98.9 °F (37.2 °C) 98 °F (36.7 °C)   TempSrc:   Axillary Axillary   SpO2: 96% 95%     Weight:       Height:           Physical Exam:  General: Obese, oriented to self and place.  Jaundiced  Head: ATNC   Eyes: PERRL, No evidence of conjunctivitis. + scleral icterus  Nose: No nasal discharge.   Mouth: Oral mucosal membranes dry. No oral ulceration or hemorrhages.   Neck: Neck supple. No thyromegaly. No JVD.   Lungs: +scattered wheezes and rhonchorous bilaterally  Heart: Tachycardia.  No murmurs, rubs, or gallops.   Abdomen: Soft. Bowel sounds are normoactive. Nontender with palpation.   Extremities: No cyanosis or edema.  Integumentary: Scattered bruising bilateral upper/lower extremities at sites of minor trauma, phlebotomy, IV sites.  No obvious bleeding.  Neurologic: MS as above. Grossly non focal exam     Labs / Studies:  Lab Results   Component Value Date    WBC 23.56 (H) 12/17/2021    HGB 9.1 (L) 12/17/2021    HCT 29.6 (L) 12/17/2021    MCV 79.8 12/17/2021    RDW 20.7 (H) 12/17/2021    PLT 25 (C) 12/17/2021    NEUTRORELPCT 90.1 (H) 12/14/2021    LYMPHORELPCT 2.6 (L) 12/14/2021    MONORELPCT 5.1 12/14/2021    EOSRELPCT 0.6 12/14/2021    BASORELPCT 0.2 12/14/2021    NEUTROABS 23.44 (H) 12/15/2021    LYMPHSABS 0.73 12/14/2021       Lab Results   Component Value Date     12/17/2021    K 6.2 (C)  12/17/2021    CO2 16.1 (L) 12/17/2021    CL 97 (L) 12/17/2021     (H) 12/17/2021    CREATININE 2.71 (H) 12/17/2021    EGFRIFNONA 19 (L) 12/17/2021    GLUCOSE 110 (H) 12/17/2021    CALCIUM 9.3 12/17/2021    ALKPHOS 198 (H) 12/17/2021     (H) 12/17/2021     (H) 12/17/2021    BILITOT 8.1 (H) 12/17/2021    ALBUMIN 4.22 12/17/2021    PROTEINTOT 7.6 12/17/2021    MG 3.1 (H) 12/15/2021    PHOS 7.2 (H) 12/16/2021       Imaging:  Adult Transthoracic Echo Complete W/ Cont if Necessary Per Protocol    Result Date: 12/9/2021  · The left ventricular cavity is moderately dilated. · Left ventricular ejection fraction appears to be less than 20%. Left ventricular systolic function is severely decreased. · The aortic valve is structurally normal with no regurgitation or stenosis present. · The mitral valve is grossly normal in structure. Mild to moderate mitral valve regurgitation is present. No significant mitral valve stenosis is present. · There is no evidence of pericardial effusion.      CT Abdomen Pelvis Without Contrast    Result Date: 12/11/2021  EXAM:   CT Abdomen and Pelvis Without Intravenous Contrast  EXAM DATE:   12/11/2021 11:18 AM  CLINICAL HISTORY:   CELSO, Increased LFTs; I26.93-Single subsegmental pulmonary embolism without acute cor pulmonale; J18.9-Pneumonia, unspecified organism; A41.9-Sepsis, unspecified organism  TECHNIQUE:   Axial computed tomography images of the abdomen and pelvis without intravenous contrast.  Sagittal and coronal reformatted images were created and reviewed.  This CT exam was performed using one or more of the following dose reduction techniques:  automated exposure control, adjustment of the mA and/or kV according to patient size, and/or use of iterative reconstruction technique.  COMPARISON:   10/23/2018  FINDINGS:   Lung bases:  Partially consolidative airspace disease right greater than left lung bases.  Interstitial thickening and groundglass attenuation both lung  bases.   Pleural space:  Small right pleural effusion.   Heart:  Marked cardiomegaly.   ABDOMEN:   Liver:  Liver cirrhosis and fatty changes of the liver are noted.   Gallbladder and bile ducts:  Gallbladder is incompletely distended. No calcified stones.  No ductal dilation.   Pancreas:  Unremarkable.  No ductal dilation.   Spleen:  Unremarkable.  No splenomegaly.   Adrenals:  Unremarkable.  No mass.   Kidneys and ureters:  Unremarkable.  No obstructing stones.  No hydronephrosis.   Stomach and bowel:  Gastric distention is noted.  No bowel obstruction.  No mucosal thickening.   PELVIS:   Appendix:  The appendix is not visualized.   Bladder:  Unremarkable.  No stones.   Reproductive:  Bilateral tubal ligation clips are noted.   ABDOMEN and PELVIS:   Intraperitoneal space:  Trace ascites.  No pneumoperitoneum identified.   Bones/joints:  Degenerative changes lumbar spine.  No acute fracture. No dislocation.   Soft tissues:  Anasarca noted diffusely.   Vasculature:  Unremarkable.  No abdominal aortic aneurysm.   Lymph nodes:  Unremarkable.  No enlarged lymph nodes.      1.  Bibasilar pneumonia and changes of CHF/edema. 2.  Small right pleural effusion and marked cardiomegaly. 3.  Diffuse anasarca. 4.  Liver cirrhosis and fatty changes of the liver. Miniscule ascites. 5.  Other nonacute findings as above.  This report was finalized on 12/11/2021 11:46 AM by Dr. Julius Arvizu MD.      US Liver    Result Date: 12/11/2021  US Liver Or Hepatic INDICATION: Elevated transaminase. History of pulmonary embolus. COMPARISON: None available. FINDINGS: LIVER: The echogenicity and echotexture of the hepatic parenchyma is within normal limits. No hepatic mass. The intrahepatic bile ducts are normal in caliber. The common duct is normal in size at the talat hepatis measuring 2.1 mm.     Negative hepatic ultrasound. Signer Name: Sourav Carrillo MD  Signed: 12/11/2021 7:58 AM  Workstation Name: [id number]  [facility]    US Gallbladder    Result Date: 12/11/2021  EXAMINATION: US GALLBLADDER-   CLINICAL INDICATION:     Abd pain, elevated transaminases; I26.93-Single subsegmental pulmonary embolism without acute cor pulmonale; J18.9-Pneumonia, unspecified organism; A41.9-Sepsis, unspecified organism  TECHNIQUE: Multiplanar gray scale ultrasound of the right upper quadrant abdomen.  COMPARISON: CT same day  FINDINGS: Pancreatic bed: Not visualized. Gallbladder: Gallbladder is unremarkable. No shadowing stones or wall thickening. No pericholecystic fluid. Bile ducts: The CBD measures 4.40 mm. Liver: Liver cirrhosis. Fatty liver.  Fluid: No ascites demonstrated. Sonographic Sherman's sign: Technologist reports a negative sonographic Sherman sign.      1. Liver cirrhosis. 2. Unremarkable sonographic appearance of gallbladder.  This report was finalized on 12/11/2021 1:15 PM by Dr. Julius Arvizu MD.      XR Chest 1 View    Result Date: 12/15/2021  EXAM:   XR Chest, 1 View  EXAM DATE:   12/15/2021 10:57 AM  CLINICAL HISTORY:   follow up pneumonia; I26.93-Single subsegmental pulmonary embolism without acute cor pulmonale; J18.9-Pneumonia, unspecified organism; A41.9-Sepsis, unspecified organism  TECHNIQUE:   Frontal view of the chest.  COMPARISON:   12/12/2021  FINDINGS:   LUNGS:  Improved aeration of the lung bases with persistent mild bibasilar airspace opacity.   PLEURAL SPACE:  Probably tiny bilateral pleural effusions.  No pneumothorax.   HEART:  Mild cardiomegaly again noted.   MEDIASTINUM:  Unremarkable.   BONES/JOINTS:  Unremarkable.   TUBES, LINES AND DEVICES:  Right central line with tip in SVC again noted.      1.  Improved aeration of the lung bases with persistent mild bibasilar airspace opacity. 2.  Probably tiny bilateral pleural effusions.  This report was finalized on 12/15/2021 12:31 PM by Dr. Kalpesh Benavides MD.      XR Chest 1 View    Result Date: 12/12/2021  EXAM:   XR Chest, 1 View  EXAM DATE:   12/12/2021  1:58 PM  CLINICAL HISTORY:   central line placement verification; I26.93-Single subsegmental pulmonary embolism without acute cor pulmonale; J18.9-Pneumonia, unspecified organism; A41.9-Sepsis, unspecified organism  TECHNIQUE:   Frontal view of the chest.  COMPARISON:   12/09/2021  FINDINGS:   Lungs:  Worsening by lateral airspace disease which may represent edema.   Pleural space:  No pneumothorax.  Small effusions.   Heart:  Cardiomegaly.   Mediastinum:  Unremarkable.   Bones/joints:  Unremarkable.   Tubes, lines and devices:  Right IJ deep line placement with tip at the cavoatrial junction.      1.  Right IJ deep line placement. No pneumothorax. 2.  Worsening changes of probable CHF/edema.  This report was finalized on 12/12/2021 2:37 PM by Dr. Julius Arvizu MD.      XR Chest 1 View    Result Date: 12/9/2021  CR Chest 1 Vw INDICATION: Shortness during cough, follow pneumonia COMPARISON:  220 FINDINGS: Portable AP view(s) of the chest. There Are low lung volumes with mild right base atelectasis or infiltrate. Left lung appears clear. Heart size normal. Bones are     Low inspiratory volumes with probable right lower lobe infiltrate or atelectasis Signer Name: Kevin Barrios MD  Signed: 12/9/2021 3:26 AM  Workstation Name: [facility]  [facility]    CT Chest Pulmonary Embolism    Result Date: 12/9/2021  CT CHEST PULMONARY EMBOLISM W CONTRAST INDICATION: Sepsis and recurrent pneumonia TECHNIQUE: CT angiogram of the chest with IV contrast. 3-D reconstructions were obtained and reviewed.   Radiation dose reduction techniques included automated exposure control or exposure modulation based on body size. Count of known CT and cardiac nuc med studies performed in previous 12 months: 0. COMPARISON: None available. FINDINGS: There is adequate opacification of pulmonary arteries. There is right lower lobe pulmonary embolus which is occlusive bases. There is dense consolidation throughout the right lower  lobe. There is mild interstitial prominence throughout the lungs there are some patchy areas of groundglass density in the upper lobes. Aorta is normal in size. There is a small right effusion. There is no adenopathy.     Right lower lobe pulmonary embolus which appears occlusive in the lower branches. There is no evidence of pulmonary infarct. Dense groundglass infiltrate throughout the right lower lobe Patchy groundglass infiltrates throughout the rest the lungs with diffuse interstitial prominence. There is no evidence of right heart enlargement I discussed these results with Dr. Naranjo at the time of this dictation Signer Name: Kevin Barrios MD  Signed: 12/9/2021 4:09 AM  Workstation [facility]: [facility]  [facility] Venous Doppler Lower Extremity Bilateral (duplex)    Result Date: 12/11/2021   Veins LE Duplex BILAT HISTORY: Pulmonary embolus. Looking for source. TECHNIQUE: Real-time ultrasound was performed of both lower extremities utilizing spectral and color Doppler with compression and augmentation techniques. COMPARISON: None available. FINDINGS: Right Lower Extremity: There is no deep venous thrombus seen in the right lower extremity, including the right common femoral veins, right femoral veins and right popliteal veins.  Normal compressibility and respiratory phasicity was visualized.  No calf vein thrombus. Greater saphenous vein is patent. Left Lower Extremity: There is no deep venous thrombus seen in the left lower extremity, including the left common femoral veins, left femoral veins and left popliteal veins.   Normal compressibility and respiratory phasicity was visualized.  No calf vein thrombus. Greater saphenous vein is patent.     No deep venous thrombus seen in either lower extremity. Signer Name: Sourav Carrillo MD  Signed: 12/11/2021 7:56 AM  Workstation Name: [facility]  [facility]     Assessment & Plan:  Tiera Mendoza is a 46 y.o.  female  with     1.  Thrombocytopenia/DIC with acute RLL PE:  -On admission, noted normal platelet count of ~318,000. UDS was also positive for methamphetamine. Her platelets started trending down and significantly dropped from ~134,000 to ~24,000 and currently ~25,000.   -Unfortunately, this is a very complicated situation with multiple competing priorties and mutliple failing organ systems with cardiogenic shock, shock liver, acute renal failure and DIC. She is at risk of continued life threatening clotting with recent PE and is also at risk for life threatening bleeding given coaguloapathy and thrombocytopenia. In terms of her pulmonary embolism and thrombocytopenia, it is likely that both are related to DIC.  She could have HIT, but this wouldn't explain other findings including hypofibrinogemia.  She is not a candidate for alternative anticoagulation such as argatroban or lepirudin given abnormal liver and renal function. HIT ab is pending.  She is also at this point in time not a good candidate for  Heparin despite her PE because currently her plts are well less than 50 and she is coagulopathic.    -DIC likely secondary to methamphetamine use along with cardiogenic shock and multi-organ failure. 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  At present, she has no obvious bleeding from any source.  -Recommend to continue to transfuse cryoprecipitate to keep Fibrinogen >100.   -Suggest to transfuse platelets for platelets <50,000 prior to procedures or any active bleeding. Otherwise, transfuse for platelet count 10-20,000.  -Recommend to check CBC, PT/INR, PTT and Fibrinogen q6 hours.  -Checked Factor 5, 7, 8 to further evaluate which are pending.  Await HIT Ab/DORIAN though again these wouldn't affect our management at this current time.  -Will closely monitor. Dr. Olson has discussed the above in detail with Dr. Barclay.       -Prognosis is very guarded.     Thank you so much for allowing us to participate in the care of Ms. Mendoza. Please do not hesitate to contact [facility] with any questions/concerns.       Electronically signed by: PEDRO Centeno , December 17, 2021 14:22 EST

## 2021-12-17 NOTE — PROGRESS NOTES
[facility] HOSPITALIST PROGRESS NOTE     Patient Identification:  Name:  Tiera Mendoza  Age:  46 y.o.  Sex:  female  :  1975  MRN:  [id number]  Visit Number:  [id number]  ROOM: [address]     Primary Care Provider:  Alan Martin APRN    Length of stay in inpatient status:  7    Subjective     Chief Compliant:    Chief Complaint   Patient presents with   • Shortness of Breath       History of Presenting Illness: Patient seen and evaluated in follow-up for acute hypoxemic hypercarbic respiratory failure secondary to sepsis with right-sided pneumonia and pulmonary embolism and acute severe cardiomyopathy with cardiogenic shock.  Overnight patient with less than detectable fibrinogen and given a second transfusion of cryoprecipitate.  Patient this morning more alert and talkative but increased oxygen requirements and appears to be having worsening likely pulmonary edema/volume overload.  Has had some blood with coughing.    Objective     Current Hospital Meds:albumin human, 25 g, Intravenous, TID  budesonide-formoterol, 2 puff, Inhalation, BID - RT  lactulose, 30 g, Oral, TID  methylPREDNISolone sodium succinate, 20 mg, Intravenous, Q24H  nystatin, 5 mL, Swish & Swallow, 4x Daily  pantoprazole, 40 mg, Oral, Q AM  sodium chloride, 10 mL, Intravenous, Q12H  sodium chloride, 10 mL, Intravenous, Q12H  sodium zirconium cyclosilicate, 10 g, Oral, BID    DOBUTamine, 2-20 mcg/kg/min, Last Rate: Stopped (12/15/21 1246)  norepinephrine, 0.02-0.3 mcg/kg/min, Last Rate: Stopped (21 1643)  Pharmacy Consult,         Current Antimicrobial Therapy:  Anti-Infectives (From admission, onward)    Ordered     Dose/Rate Route Frequency Start Stop    21 0319  meropenem (MERREM) 1 g in sodium chloride 0.9 % 100 mL IVPB-VTB        Ordering Provider: Gabriele Bo MD    1 g  over 30 Minutes Intravenous Once 21 0430 21 0435    21 1539  piperacillin-tazobactam (ZOSYN) IVPB 3.375 g in  100 mL NS VTB        Ordering Provider: Natanael Toscano MD    3.375 g  over 30 Minutes Intravenous Once 12/09/21 1700 12/09/21 1637    12/09/21 0425  piperacillin-tazobactam (ZOSYN) IVPB 4.5 g in 100 mL NS VTB        Ordering Provider: Francisco Naranjo MD    4.5 g  over 30 Minutes Intravenous Once 12/09/21 0427 12/09/21 0556    12/09/21 0329  cefTRIAXone (ROCEPHIN) 2 g in sodium chloride 0.9 % 100 mL IVPB-VTB        Ordering Provider: Francisco Naranjo MD    2 g  200 mL/hr over 30 Minutes Intravenous Once 12/09/21 0331 12/09/21 0444        Current Diuretic Therapy:  Diuretics (From admission, onward)    Ordered     Dose/Rate Route Frequency Start Stop    12/16/21 1236  furosemide (LASIX) injection 60 mg        Ordering Provider: Kirstin Esparza MD    60 mg Intravenous Once 12/16/21 1600 12/16/21 1718    12/14/21 1000  furosemide (LASIX) injection 20 mg        Ordering Provider: Kirstin Esparza MD    20 mg Intravenous Once 12/14/21 1100 12/14/21 1132    12/14/21 1000  furosemide (LASIX) 100 mg in sodium chloride 0.9 % 50 mL IVPB        Ordering Provider: Kirstin Esparza MD    100 mg  6.5 mL/hr over 10 Hours Intravenous Once 12/14/21 1100 12/14/21 2135        ----------------------------------------------------------------------------------------------------------------------  Vital Signs:  Temp:  [97.1 °F (36.2 °C)-98.4 °F (36.9 °C)] 97.2 °F (36.2 °C)  Heart Rate:  [100-128] 123  Resp:  [16-24] 24  BP: ()/() 107/76  SpO2:  [90 %-100 %] 96 %  on  Flow (L/min):  [2-3] 2.5;   Device (Oxygen Therapy): nasal cannula  Body mass index is 45.37 kg/m².    Wt Readings from Last 3 Encounters:   12/16/21 135 kg (298 lb 6.4 oz)   12/03/20 126 kg (276 lb 12.8 oz)   01/01/19 118 kg (260 lb)     Intake & Output (last 3 days)       12/14 0701  12/15 0700 12/15 0701  12/16 0700 12/16 0701  12/17 0700    P.O. 13 80     I.V. (mL/kg) 869.1 (6.4) 68.6 (0.5)     Blood  357     IV Piggyback 400 200 150    Total  Intake(mL/kg) 1282.1 (9.4) 705.6 (5.2) 150 (1.1)    Urine (mL/kg/hr) 1175 (0.4) 1050 (0.3) 1225 (0.7)    Stool 0 0     Total Output 1175 1050 1225    Net +107.1 -344.5 -1075           Stool Unmeasured Occurrence 1 x 1 x         Diet Clear Liquid  ----------------------------------------------------------------------------------------------------------------------  Physical exam:  Constitutional: Morbidly obese adult female resting in bed, NAD  HENT:  Head:  Normocephalic and atraumatic.  Mouth:  Moist mucous membranes.    Eyes:  Conjunctivae and EOM are normal. No scleral icterus.     Cardiovascular: Irregularly irregular with no murmur, rub, or gallop  Pulmonary/Chest: Decreased breath sounds throughout with poor air movement.  Abdominal:  Soft.  Bowel sounds are normal.  No distension and no tenderness.   Musculoskeletal:  No edema, no tenderness, and no deformity.    Neurological: Somnolent but arousable and interactive.  No focal neurological deficits on limited exam  Skin:  Skin is warm and dry. No rash or lesion noted. No pallor.   Peripheral vascular:  Pulses in all 4 extremities with no clubbing, no cyanosis.    ----------------------------------------------------------------------------------------------------------------------  Tele:    ----------------------------------------------------------------------------------------------------------------------  Results from last 7 days   Lab Units 12/16/21  1735 12/16/21  1734 12/16/21  1149 12/16/21  0535 12/15/21  2351 12/15/21  2351 12/15/21  1755 12/15/21  1725 12/15/21  1145 12/15/21  0534 12/15/21  0534 12/14/21  0401 12/13/21  0356 12/12/21  0057 12/12/21  0057 12/11/21  0444 12/10/21  2251 12/10/21  0044 12/09/21 2026   CRP mg/dL  --   --   --   --   --   --   --   --   --   --   --   --  16.36*  --  22.87*  --  27.13*   < >  --    LACTATE mmol/L  --   --   --   --   --   --   --   --   --   --   --   --   --   --  3.9*  --   --   --  3.6*   WBC 10*3/mm3  21.41*  --  23.75* 24.88*   < > 25.33*   < >  --  26.77*   < > 26.64*   < > 26.58*   < > 27.26*  --  31.70*   < >  --    HEMOGLOBIN g/dL 8.9*  --  9.5* 9.1*   < > 9.4*   < >  --  9.8*   < > 9.9*   < > 10.0*   < > 10.5*  --  9.8*   < >  --    HEMATOCRIT % 29.2*  --  30.2* 28.9*   < > 29.7*   < >  --  31.1*   < > 31.5*   < > 32.1*   < > 34.7  --  33.7*   < >  --    MCV fL 77.7*  --  77.2* 76.3*   < > 76.2*   < >  --  76.8*   < > 75.9*   < > 78.1*   < > 81.6  --  84.0   < >  --    MCHC g/dL 30.5*  --  31.5 31.5   < > 31.6   < >  --  31.5   < > 31.4*   < > 31.2*   < > 30.3*  --  29.1*   < >  --    PLATELETS 10*3/mm3 27*  --  30* 29*   < > 32*   < >  --  18*   < > 24*   < > 134*   < > 198  --  256   < >  --    INR   --  2.80* 2.84* 2.70*  --  3.86*  --  3.00* 3.47*  --  2.68*   < >  --   --  3.62*   < >  --   --   --     < > = values in this interval not displayed.     Results from last 7 days   Lab Units 12/16/21  0244   PH, ARTERIAL pH units 7.410   PO2 ART mm Hg 57.1*   PCO2, ARTERIAL mm Hg 38.0   HCO3 ART mmol/L 24.0     Results from last 7 days   Lab Units 12/16/21  1149 12/16/21  0739 12/16/21  0535 12/15/21  2351 12/15/21  0534 12/15/21  0534 12/14/21 2015 12/14/21  0401 12/11/21  0124 12/10/21  2251 12/10/21  0044   SODIUM mmol/L  --  138  138 136 135*   < > 134*  134*   < > 132*   < >  --  131*   POTASSIUM mmol/L  --  6.1*  6.1* 5.6* 6.1*   < > 5.2  5.2   < > 5.1   < >  --  4.4   MAGNESIUM mg/dL  --   --   --   --   --  3.1*  --   --   --  2.5 2.1   CHLORIDE mmol/L  --  97*  97* 96* 96*   < > 98  98   < > 98   < >  --  98   CO2 mmol/L  --  20.6*  20.6* 22.0 16.7*   < > 22.2  22.2   < > 19.1*   < >  --  16.5*   BUN mg/dL  --  126*  126* 122* 124*   < > 111*  111*   < > 95*   < >  --  23*   CREATININE mg/dL  --  3.11*  3.11* 3.31* 3.15*   < > 3.35*  3.35*   < > 3.12*   < >  --  1.05*   EGFR IF NONAFRICN AM mL/min/1.73  --  16*  16* 15* 16*   < > 15*  15*   < > 16*   < >  --  57*   CALCIUM mg/dL  --   9.1  9.1 9.4 9.0   < > 8.4*  8.4*   < > 7.4*   < >  --  8.4*   PHOSPHORUS mg/dL  --  7.2*  --  7.0*  --  6.5*   < >  --   --   --   --    GLUCOSE mg/dL  --  151*  148* 161* 134*   < > 146*  146*   < > 150*   < >  --  198*   ALBUMIN g/dL  --  3.84  3.84  --  3.89  --  3.17*  3.17*   < > 2.49*   < >  --  2.80*   BILIRUBIN mg/dL 9.2* 9.3*  --   --   --  6.3*  --  4.7*   < >  --  3.1*   ALK PHOS U/L  --  146*  --   --   --  185*  --  182*   < >  --  180*   AST (SGOT) U/L  --  148*  --   --   --  337*  --  661*   < >  --  242*   ALT (SGPT) U/L  --  480*  --   --   --  775*  --  1,101*   < >  --  222*    < > = values in this interval not displayed.   Estimated Creatinine Clearance: 33.3 mL/min (A) (by C-G formula based on SCr of 3.11 mg/dL (H)).  No results found for: AMMONIA  Results from last 7 days   Lab Units 12/13/21  0356 12/12/21  0057 12/11/21  0445 12/11/21  0124 12/10/21  2251 12/10/21  0303   CK TOTAL U/L 859* 1,495* 2,338*  --    < >  --    TROPONIN T ng/mL  --   --  0.019 0.015  --  <0.010    < > = values in this interval not displayed.             Glucose   Date/Time Value Ref Range Status   12/16/2021 1643 152 (H) 70 - 130 mg/dL Final     Comment:     Meter: [id number] : [id number] ELIANE RAN   12/16/2021 1221 207 (H) 70 - 130 mg/dL Final     Comment:     Meter: [id number] : [id number] Abe Husain DAHLIA   12/16/2021 0546 171 (H) 70 - 130 mg/dL Final     Comment:     Meter: [id number] : [id number] Shira Edmondson   12/16/2021 0034 153 (H) 70 - 130 mg/dL Final     Comment:     Meter: [id number] : [id number] Noah Walsh   12/15/2021 1705 138 (H) 70 - 130 mg/dL Final     Comment:     Meter: [id number] : [id number] Frances Margi   12/15/2021 1336 232 (H) 70 - 130 mg/dL Final     Comment:     Meter: [id number] : [id number] Frances Margi   12/15/2021 0559 135 (H) 70 - 130 mg/dL Final     Comment:     Meter: [id number] : [id number] Noah Coleanda   12/14/2021 2358 175 (H) 70 - 130  mg/dL Final     Comment:     Meter: [id number] : [id number] Noah Walsh     Lab Results   Component Value Date    TSH 4.040 12/10/2021    FREET4 0.81 (L) 12/10/2021     Lab Results   Component Value Date    PREGTESTUR Negative 12/03/2020     Pain Management Panel     Pain Management Panel Latest Ref Rng & Units 12/11/2021 12/11/2021    CREATININE UR mg/dL 108.5 108.5    AMPHETAMINES SCREEN, URINE Negative - -    BARBITURATES SCREEN Negative - -    BENZODIAZEPINE SCREEN, URINE Negative - -    BUPRENORPHINEUR Negative - -    COCAINE SCREEN, URINE Negative - -    METHADONE SCREEN, URINE Negative - -    METHAMPHETAMINEUR Negative - -        Brief Urine Lab Results  (Last result in the past 365 days)      Color   Clarity   Blood   Leuk Est   Nitrite   Protein   CREAT   Urine HCG        12/11/21 1328             108.5         12/11/21 1328             108.5             Blood Culture   Date Value Ref Range Status   12/09/2021 No growth at 4 days  Preliminary   12/09/2021 No growth at 4 days  Preliminary     No results found for: URINECX  No results found for: WOUNDCX  No results found for: STOOLCX  No results found for: RESPCX  No results found for: AFBCX  Results from last 7 days   Lab Units 12/13/21  0356 12/12/21  0057 12/10/21  2251 12/10/21  0044 12/09/21 2026   LACTATE mmol/L  --  3.9*  --   --  3.6*   CRP mg/dL 16.36* 22.87* 27.13* 30.87*  --        I have personally looked at the labs and they are summarized above.  ----------------------------------------------------------------------------------------------------------------------  Detailed radiology reports for the last 24 hours:    Imaging Results (Last 24 Hours)     Procedure Component Value Units Date/Time     Liver [[id number]] Collected: 12/16/21 1258     Updated: 12/16/21 1301    Narrative:      EXAM:    US Abdomen Limited, Right Upper Quadrant     EXAM DATE:    12/16/2021 12:21 PM     CLINICAL HISTORY:    DIC, elevated bilirubin, eval for  cholodocholithiasis; I26.93-Single  subsegmental pulmonary embolism without acute cor pulmonale;  J18.9-Pneumonia, unspecified organism; A41.9-Sepsis, unspecified  organism     TECHNIQUE:    Real-time ultrasound of the right upper quadrant with image  documentation.     COMPARISON:    No relevant prior studies available.     FINDINGS:    Liver:  Unremarkable.  No mass.  No intrahepatic bile duct dilation.    Gallbladder:  Gallbladder not well visualized on this study.    Common bile duct:  The CBD measures 6.72 mm.  No stones.  No dilation.    Pancreas:  Unremarkable as visualized.    Right kidney: Unremarkable.  No stones.  No solid mass.  No  hydronephrosis.       Impression:        No acute findings in the right upper quadrant.     This report was finalized on 12/16/2021 12:59 PM by Dr. Kalpesh Benavides MD.       XR Chest 1 View [[id number]] Collected: 12/16/21 1222     Updated: 12/16/21 1224    Narrative:      EXAM:    XR Chest, 1 View     EXAM DATE:    12/16/2021 11:34 AM     CLINICAL HISTORY:    hypoxia; I26.93-Single subsegmental pulmonary embolism without acute  cor pulmonale; J18.9-Pneumonia, unspecified organism; A41.9-Sepsis,  unspecified organism     TECHNIQUE:    Frontal view of the chest.     COMPARISON:    12/15/2021     FINDINGS:    LUNGS:  Vague bilateral airspace disease is again noted.    PLEURAL SPACE:  Small right and tiny left pleural effusion.  No  pneumothorax.    HEART:  Cardiomegaly.    MEDIASTINUM:  Unremarkable.    BONES/JOINTS:  Unremarkable.    TUBES, LINES AND DEVICES:  Right central line with tip in SVC.       Impression:      1.  Cardiomegaly.  2.  Small right and tiny left pleural effusion.  3.  Vague bilateral airspace disease is again noted.     This report was finalized on 12/16/2021 12:22 PM by Dr. Kalpesh Benavides MD.           Assessment & Plan      Cardiogenic shock  Acute hypoxemic hypercarbic respiratory failure  Acute pulmonary embolism  Lactic acidemia    -Heparin GTT held  secondary to thrombocytopenia and DIC    -Remains off Levophed and dobutamine at this time    -Pleated appropriate course of antibiotic therapy for pneumonia    -Patient with increased oxygen requirements now up to 2 L, patient on auscultation with crackles concerning for worsening pulmonary edema.    -Cardiology consulted and following along, appreciate input and help unable to initiate guideline directed medical therapy at this time due to hypotension and renal failure.      -Patient previously accepted to [facility] for by heart failure/transplant team after discussion with cardiology however today and rediscussion with the transfer team and then with heart failure service and cardiology with patient's development of DIC in the setting of her cirrhosis and previous drug abuse is no longer a candidate for transfer as they would not perform any interventions at this time.  Patient is now off of the transfer list for .    -TTE shows EF less than 20% with severely decreased LV systolic function with moderately dilated left ventricular cavity.  No pericardial effusion.  A repeat limited echocardiogram to reassess valvular function and EF has been ordered given patient's titration off of dobutamine and Levophed.    -She remains with clinically volume overloaded status, would benefit from diuretics and nephrology has initiated patient on repeat Lasix.    Acute thrombocytopenia  Disseminated intravascular coagulation    -Patient with development of thrombocytopenia this morning initially concerning for HIT however fibrinogen level and D-dimer level checked and less than 100 and greater than 20 respectively with an LDH greater than 700 highly consistent concerning for DIC despite no clinical evidence of bleeding at this time from any IV sites.    -Hematology/oncology consultation obtained given complicated situation given the fact the patient-now has a contraindication anticoagulation but also at the same time  has a pulmonary embolism as well as having evidence of still ongoing liver dysfunction and cirrhosis with also atrial fibrillation/flutter.    -Transfuse for platelet count of 10-20,000 or platelet count less than 50,000 with signs of active bleeding or going for any procedures.    -Continue every 6 hour checks of hemoglobin, platelets, dimer, fibrinogen, LDH.    -Patient is essentially multiorgan failure at this point despite her respiratory status is at high risk for further decompensation and ultimately death.     Acute kidney injury  Rhabdomyolysis  Hyperkalemia    -Suspected secondary to ATN and possible contrast-induced nephropathy.  Creatinine still trending upward at this time.    -Nephrology consulted and following along, appreciate input and help    Cirrhosis  Hyperbilirubinemia  Shock Liver with acute liver failure  Metabolic encephalopathy    -Continue lactulose, 30 g 3 times daily    -Liver enzymes have improved however patient's INR remains greater than 1.5, she has hyperbilirubinemia with no evidence of any obstructive process on repeat ultrasound and has still some mild encephalopathy.  Overall despite improving LFTs and now with her being in DIC patient's overall presentation is consistent with liver failure and her prognosis is quite poor.    Polysubstance abuse    -Patient positive for methamphetamines at admission    -Complicates all aspects of care and could be a significant contributor to patient's current situation and DSE.    Approximately 35 minutes of critical care time was spent seeing and evaluating the patient, reviewing laboratory data, obtaining further laboratory evaluation and coordinating care and speaking with consultants to facilitate further treatment and prevent further decompensation and death as patient is extremely high risk of death at this time    VTE Prophylaxis:   Mechanical Order History:     None      Pharmalogical Order History:      Ordered     Dose Route Frequency  Stop    12/09/21 0425  heparin (porcine) 5000 UNIT/ML injection 5,000 Units         5,000 Units IV Once 12/09/21 0449    12/09/21 0425  heparin 76289 units/250 mL (100 units/mL) in 0.45 % NaCl infusion  9.99 mL/hr         7.87 Units/kg/hr IV Titrated --    12/09/21 0425  heparin (porcine) 5000 UNIT/ML injection 5,000 Units         5,000 Units IV As Needed --    12/09/21 0425  heparin (porcine) 5000 UNIT/ML injection 2,500 Units         2,500 Units IV As Needed --                Disposition taken off of [facility] transfer list due to no longer qualifying for any higher level of care intervention.  Patient's prognosis is very poor and disposition unclear at this time as patient is a extremely high risk for death.    Isael Barclay DO  [facility]  12/16/21  20:11 EST

## 2021-12-18 NOTE — PLAN OF CARE
Goal Outcome Evaluation:  Plan of Care Reviewed With: patient  Progress: no change  No acute events overnight. Patient remains on dobutamine gtt. S/O at bedside, call light within reach.

## 2021-12-18 NOTE — PROGRESS NOTES
LOS: 9 days     Name: Tiera Ridner  Age/Sex: 46 y.o. female  :  1975        PCP: Alan Martin APRN  REF: No Known Provider    Principal Problem:    Single subsegmental pulmonary embolism without acute cor pulmonale (HCC)      Reason for follow-up: Atrial fibrillation and Cardiomyopathy    Subjective       Subjective     46-year-old woman has been admitted with chief complaint of progressive shortness of breath for the last 3 months    Interval History: Patient sitting up in the bed eating.  More alert today.  Remains in atrial flutter with RVR with a heart rate in the 140s.  On IV dobutamine.  Platelets 14,000 today.  Hemoglobin 8.    Vital Signs  Temp:  [96.3 °F (35.7 °C)-98.9 °F (37.2 °C)] 97.8 °F (36.6 °C)  Heart Rate:  [121-154] 131  Resp:  [14-] 22  BP: ()/() 115/93     Vital Signs (last 72 hrs)       12/15 0700   0659  0700   0659  0700   0659  0700   1003   Most Recent      Temp (°F) 97.1 -  98.4    97 -  98.1    96.3 -  98.9      97.8     97.8 (36.6)  0800    Heart Rate 100 -  139    105 -  135    121 -  154    130 -  131     131 18 0800    Resp 16 -  24    16 -  24    14 -  22       22  0655    BP 90/70 -  146/112    96/73 -  142/103    77/68 -  138/113    115/93 -  125/85     115/93 18 0800    SpO2 (%) 88 -  100    90 -  100    88 -  99    91 -  97     91  0800        Documented weights    21 0155 12/09/21 2001 12/10/21 0500 21 0500   Weight: 127 kg (280 lb) 131 kg (288 lb 8 oz) 131 kg (288 lb 12.8 oz) 135 kg (298 lb 3.2 oz)    21 0500 21 1455 12/15/21 0410 21 0300   Weight: 132 kg (290 lb 11.2 oz) (!) 137 kg (301 lb 13 oz) 136 kg (298 lb 12.8 oz) 135 kg (298 lb 4.8 oz)    21 0540 21 0500   Weight: 135 kg (298 lb 6.4 oz) 133 kg (293 lb 3.2 oz)      Body mass index is 44.58 kg/m².    Intake/Output Summary (Last 24 hours) at 2021 1003  Last data filed at 2021 0918  Gross  per 24 hour   Intake 1443.9 ml   Output 1615 ml   Net -171.1 ml     Objective    Objective       Physical Exam:     General Appearance:    Alert, cooperative, in no acute distress   Head:    Normocephalic, without obvious abnormality, atraumatic   Eyes:            Conjunctivae and sclerae normal, no   icterus, no pallor, corneas clear.   Neck:   No adenopathy, supple, trachea midline, no thyromegaly, no   carotid bruit, no JVD   Lungs:     Clear to auscultation,respirations regular, even and                  unlabored    Heart:    irregular rhythm and tachycardia, normal S1 and S2, no            murmur, no gallop, no rub, no click   Chest Wall:    No abnormalities observed   Abdomen:     Normal bowel sounds, no masses, no organomegaly, soft        non-tender, non-distended, no guarding, no rebound                tenderness   Extremities:   Moves all extremities well, no edema, no cyanosis, no             redness   Pulses:   Pulses palpable and equal bilaterally   Skin:   No bleeding, bruising or rash       Neurologic:   Alert and oriented     Results review       Results Review:   Results from last 7 days   Lab Units 12/18/21  0552 12/17/21  1722 12/17/21  1149 12/17/21  0550 12/17/21  0054 12/16/21  1735 12/16/21  1149   WBC 10*3/mm3 19.24* 25.13* 23.56* 23.23* 22.98* 21.41* 23.75*   HEMOGLOBIN g/dL 7.9* 9.0* 9.1* 8.9* 8.9* 8.9* 9.5*   PLATELETS 10*3/mm3 14* 25* 25* 23* 25* 27* 30*     Results from last 7 days   Lab Units 12/18/21  0021 12/17/21  1900 12/17/21  1149 12/17/21  0054 12/16/21  0739 12/16/21  0535 12/15/21  2351 12/15/21  0534 12/15/21  0534 12/14/21  2015 12/14/21  0401 12/13/21  1216 12/13/21  0356 12/12/21  2139 12/12/21  1736   SODIUM mmol/L 143 142 138 139 138  138 136 135*   < > 134*  134*   < > 132*   < > 131*   < > 127*   POTASSIUM mmol/L 5.6* 6.1* 6.2* 6.1* 6.1*  6.1* 5.6* 6.1*   < > 5.2  5.2   < > 5.1   < > 5.6*   < > 5.3*   CHLORIDE mmol/L 99 97* 97* 98 97*  97* 96* 96*   < > 98  98    < > 98   < > 97*   < > 95*   CO2 mmol/L 22.3 19.1* 16.1* 16.7* 20.6*  20.6* 22.0 16.7*   < > 22.2  22.2   < > 19.1*   < > 22.0   < > 19.3*   BUN mg/dL 129* 124* 122* 122* 126*  126* 122* 124*   < > 111*  111*   < > 95*   < > 82*   < > 71*   CREATININE mg/dL 2.81* 2.92* 2.71* 2.86* 3.11*  3.11* 3.31* 3.15*   < > 3.35*  3.35*   < > 3.12*   < > 3.18*   < > 2.99*   CALCIUM mg/dL 9.4 9.5 9.3 9.0 9.1  9.1 9.4 9.0   < > 8.4*  8.4*   < > 7.4*   < > 7.4*   < > 7.5*   GLUCOSE mg/dL 154* 93 110* 128* 151*  148* 161* 134*   < > 146*  146*   < > 150*   < > 155*   < > 165*   ALT (SGPT) U/L 234*  --   --  349* 480*  --   --   --  775*  --  1,101*  --  1,456*  --  1,577*   AST (SGOT) U/L 113*  --   --  105* 148*  --   --   --  337*  --  661*  --  1,567*  --  2,069*    < > = values in this interval not displayed.     Results from last 7 days   Lab Units 12/13/21  0356 12/12/21  0057   CK TOTAL U/L 859* 1,495*     Lab Results   Component Value Date    INR 3.27 (H) 12/18/2021    INR 3.48 (H) 12/18/2021    INR 3.02 (H) 12/17/2021    INR 3.18 (H) 12/17/2021    INR 2.85 (H) 12/17/2021    INR 2.05 (H) 12/17/2021    INR 2.80 (H) 12/16/2021     Lab Results   Component Value Date    MG 3.1 (H) 12/15/2021    MG 2.5 12/10/2021    MG 2.1 12/10/2021     Lab Results   Component Value Date    TSH 4.040 12/10/2021      Imaging Results (Last 48 Hours)     Procedure Component Value Units Date/Time    US Renal Bilateral [[id number]] Collected: 12/17/21 1844     Updated: 12/17/21 1846    Narrative:      US Renal Comp    HISTORY:  Acute kidney insufficiency. Sepsis.    COMPARISON:    None available.    TECHNIQUE:  Grayscale ultrasound imaging of both kidneys and the urinary bladder was performed. Exam technically limited secondary to patient body habitus and overlying bowel gas artifact.    FINDINGS:  Both kidneys are normal in size and ultrasound appearance. No evidence of urinary obstruction. No visible nephrolithiasis, renal mass,  perinephric fluid collection or renal parenchymal scarring. The urinary bladder is within normal limits. Renal length  measures 10.1 cm on the right and 10.3 cm on the left.      Impression:      Negative bilateral renal ultrasound examination.    Signer Name: Tomas Carrillo MD   Signed: 12/17/2021 6:44 PM   Workstation Name: [email or web address]    [facility] [[id number]] Collected: 12/16/21 1258     Updated: 12/16/21 1301    Narrative:      EXAM:    US Abdomen Limited, Right Upper Quadrant     EXAM DATE:    12/16/2021 12:21 PM     CLINICAL HISTORY:    DIC, elevated bilirubin, eval for cholodocholithiasis; I26.93-Single  subsegmental pulmonary embolism without acute cor pulmonale;  J18.9-Pneumonia, unspecified organism; A41.9-Sepsis, unspecified  organism     TECHNIQUE:    Real-time ultrasound of the right upper quadrant with image  documentation.     COMPARISON:    No relevant prior studies available.     FINDINGS:    Liver:  Unremarkable.  No mass.  No intrahepatic bile duct dilation.    Gallbladder:  Gallbladder not well visualized on this study.    Common bile duct:  The CBD measures 6.72 mm.  No stones.  No dilation.    Pancreas:  Unremarkable as visualized.    Right kidney: Unremarkable.  No stones.  No solid mass.  No  hydronephrosis.       Impression:        No acute findings in the right upper quadrant.     This report was finalized on 12/16/2021 12:59 PM by Dr. Kalpesh Benavides MD.       XR Chest 1 View [[id number]] Collected: 12/16/21 1222     Updated: 12/16/21 1224    Narrative:      EXAM:    XR Chest, 1 View     EXAM DATE:    12/16/2021 11:34 AM     CLINICAL HISTORY:    hypoxia; I26.93-Single subsegmental pulmonary embolism without acute  cor pulmonale; J18.9-Pneumonia, unspecified organism; A41.9-Sepsis,  unspecified organism     TECHNIQUE:    Frontal view of the chest.     COMPARISON:    12/15/2021     FINDINGS:    LUNGS:  Vague bilateral airspace disease is again noted.     PLEURAL SPACE:  Small right and tiny left pleural effusion.  No  pneumothorax.    HEART:  Cardiomegaly.    MEDIASTINUM:  Unremarkable.    BONES/JOINTS:  Unremarkable.    TUBES, LINES AND DEVICES:  Right central line with tip in SVC.       Impression:      1.  Cardiomegaly.  2.  Small right and tiny left pleural effusion.  3.  Vague bilateral airspace disease is again noted.     This report was finalized on 12/16/2021 12:22 PM by Dr. Kalpesh Benavides MD.           Echo   Results for orders placed during the hospital encounter of 12/09/21    Adult Transthoracic Echo Limited W/ Cont if Necessary Per Protocol    Interpretation Summary  · The left ventricular cavity is moderately dilated.  · Left ventricular ejection fraction appears to be 21 - 25%. Left ventricular systolic function is severely decreased.  · The right ventricular cavity is mildly dilated.  · Moderately reduced right ventricular systolic function noted.  · Comments: LV systolic function is about the same as on the recent echo Doppler study.     I reviewed the patient's new clinical results.    Telemetry: Atrial flutter 140s     Medication Review:   albumin human, 25 g, Intravenous, TID  budesonide-formoterol, 2 puff, Inhalation, BID - RT  lactulose, 20 g, Oral, Once  lactulose, 30 g, Oral, TID  nystatin, 5 mL, Swish & Swallow, 4x Daily  pantoprazole, 40 mg, Oral, Q AM  sodium chloride, 10 mL, Intravenous, Q12H  sodium chloride, 10 mL, Intravenous, Q12H  sodium polystyrene, 30 g, Oral, Once        DOBUTamine, 3 mcg/kg/min, Last Rate: 3 mcg/kg/min (12/17/21 3031)  norepinephrine, 0.02-0.3 mcg/kg/min, Last Rate: Stopped (12/12/21 1643)  Pharmacy Consult,         Assessment      Assessment:  1. Acute systolic heart failure with severe left ventricular systolic dysfunction EF less than 20%  2. Persistent atrial fibrillation/flutter  3. Acute pulmonary embolism  4. Shock liver  5. Acute renal failure secondary to poor perfusion  6. Acute hypoxic respiratory  failure due to pneumonia PE and heart failure          Plan     Recommendations:  1. Patient with hemoptysis and severe thrombocytopenia continues congestive cardiac failure was so far no adequate diuresis nephrology also following  2. She still in atrial flutter with RVR unable to cardiovert because of severe thrombocytopenia and patient and not anticoagulated, also doubt that she will maintain sinus rhythm  3. Nephrology is following  4. Overall poor prognosis    I discussed the patients findings and my recommendations with patient and family      Electronically signed by PEDRO Adan, 12/18/21, 10:03 AM EST.  Electronically signed by Teresa Moya MD, 12/18/21, 1:50 PM EST.    Please note that portions of this note were completed with a voice recognition program.

## 2021-12-18 NOTE — PROGRESS NOTES
Nephrology Progress Note      Subjective     Patient still has swelling and some shortness of breath.     Objective       Vital signs :     Temp:  [96.3 °F (35.7 °C)-98.9 °F (37.2 °C)] 98 °F (36.7 °C)  Heart Rate:  [121-154] 154  Resp:  [14-22] 20  BP: ()/() 110/75      Intake/Output Summary (Last 24 hours) at 12/18/2021 0859  Last data filed at 12/18/2021 0654  Gross per 24 hour   Intake 1343.9 ml   Output 1615 ml   Net -271.1 ml       Physical Exam:    General Appearance : not in acute distress  Lungs : B/L lower zone crackles  Heart :  regular rhythm & normal rate, normal S1, S2 and no murmur, no rub  Abdomen : normal bowel sounds, no masses, no hepatomegaly, no splenomegaly, soft non-tender and no guarding  Extremities : 1+edema, no cyanosis and no redness  Neurologic :   Is alert.     Laboratory Data :     Albumin Albumin   Date Value Ref Range Status   12/18/2021 4.49 3.50 - 5.20 g/dL Final   12/17/2021 4.22 3.50 - 5.20 g/dL Final   12/16/2021 3.84 3.50 - 5.20 g/dL Final   12/16/2021 3.84 3.50 - 5.20 g/dL Final   12/15/2021 3.89 3.50 - 5.20 g/dL Final      Magnesium No results found for: MG       PTH               No results found for: PTH    CBC and coagulation:  Results from last 7 days   Lab Units 12/18/21  0552 12/18/21  0021 12/17/21  1723 12/17/21  1722 12/17/21  1149 12/17/21  1149 12/17/21  0551 12/17/21  0550 12/17/21  0054 12/16/21  1735 12/16/21  1734 12/14/21  0401 12/13/21  0356 12/12/21  0057 12/12/21  0057   LACTATE mmol/L  --   --   --   --   --   --   --   --   --   --   --   --   --   --  3.9*   CRP mg/dL  --   --   --   --   --   --   --   --   --   --   --   --  16.36*  --  22.87*   WBC 10*3/mm3 19.24*  --   --  25.13*  --  23.56*  --    < > 22.98*   < >  --    < > 26.58*   < > 27.26*   HEMOGLOBIN g/dL 7.9*  --   --  9.0*  --  9.1*  --    < > 8.9*   < >  --    < > 10.0*   < > 10.5*   HEMATOCRIT % 25.4*  --   --  29.5*  --  29.6*  --    < > 28.3*   < >  --    < > 32.1*   < > 34.7    MCV fL 78.2*  --   --  78.5*  --  79.8  --    < > 76.7*   < >  --    < > 78.1*   < > 81.6   MCHC g/dL 31.1*  --   --  30.5*  --  30.7*  --    < > 31.4*   < >  --    < > 31.2*   < > 30.3*   PLATELETS 10*3/mm3 14*  --   --  25*  --  25*  --    < > 25*   < >  --    < > 134*   < > 198   INR  3.27* 3.48* 3.02*  --    < > 3.18* 2.85*   < > 2.05*   < > 2.80*   < >  --   --  3.62*   D DIMER QUANT MCGFEU/mL >20.00* >20.00* >20.00*  --   --  >20.00* >20.00*  --  >20.00*  --  >20.00*   < >  --   --   --     < > = values in this interval not displayed.     Acid/base balance:  Results from last 7 days   Lab Units 12/16/21  0244 12/13/21  0431 12/11/21  1944   PH, ARTERIAL pH units 7.410 7.395 7.323*   PO2 ART mm Hg 57.1* 94.3 74.6*   PCO2, ARTERIAL mm Hg 38.0 42.0 43.8   HCO3 ART mmol/L 24.0 25.7 22.7     Renal and electrolytes:  Results from last 7 days   Lab Units 12/18/21  0021 12/17/21  1900 12/17/21  1149 12/17/21  0054 12/17/21  0054 12/16/21  0739 12/16/21  0739 12/16/21  0535 12/15/21  2351 12/15/21  0534 12/15/21  0534   SODIUM mmol/L 143 142 138  --  139  --  138  138   < > 135*   < > 134*  134*   POTASSIUM mmol/L 5.6* 6.1* 6.2*   < > 6.1*   < > 6.1*  6.1*   < > 6.1*   < > 5.2  5.2   MAGNESIUM mg/dL  --   --   --   --   --   --   --   --   --   --  3.1*   CHLORIDE mmol/L 99 97* 97*   < > 98   < > 97*  97*   < > 96*   < > 98  98   CO2 mmol/L 22.3 19.1* 16.1*   < > 16.7*   < > 20.6*  20.6*   < > 16.7*   < > 22.2  22.2   BUN mg/dL 129* 124* 122*   < > 122*   < > 126*  126*   < > 124*   < > 111*  111*   CREATININE mg/dL 2.81* 2.92* 2.71*  --  2.86*  --  3.11*  3.11*   < > 3.15*   < > 3.35*  3.35*   EGFR IF NONAFRICN AM mL/min/1.73 18* 17* 19*   < > 18*   < > 16*  16*   < > 16*   < > 15*  15*   CALCIUM mg/dL 9.4 9.5 9.3   < > 9.0   < > 9.1  9.1   < > 9.0   < > 8.4*  8.4*   PHOSPHORUS mg/dL  --   --   --   --   --   --  7.2*  --  7.0*  --  6.5*    < > = values in this interval not displayed.     Estimated  Creatinine Clearance: 36.5 mL/min (A) (by C-G formula based on SCr of 2.81 mg/dL (H)).    Liver and pancreatic function:  Results from last 7 days   Lab Units 12/18/21  0021 12/17/21 0054 12/16/21  1149 12/16/21  0739 12/16/21  0739 12/14/21  0401 12/13/21  0356 12/12/21 0057 12/11/21  1903   ALBUMIN g/dL 4.49 4.22  --   --  3.84  3.84   < > 2.76*   < >  --    BILIRUBIN mg/dL 10.0* 8.1* 9.2*   < > 9.3*   < > 5.5*   < >  --    ALK PHOS U/L 103 198*  --   --  146*   < > 192*   < >  --    AST (SGOT) U/L 113* 105*  --   --  148*   < > 1,567*   < >  --    ALT (SGPT) U/L 234* 349*  --   --  480*   < > 1,456*   < >  --    AMMONIA umol/L  --   --   --   --   --   --   --   --  103*   AMYLASE U/L  --   --   --   --   --   --  226*  --   --    LIPASE U/L  --   --   --   --   --   --  225*  --   --     < > = values in this interval not displayed.         Cardiac:      Liver and pancreatic function:  Results from last 7 days   Lab Units 12/18/21  0021 12/17/21 0054 12/16/21  1149 12/16/21  0739 12/16/21  0739 12/14/21  0401 12/13/21  0356 12/12/21 0057 12/11/21  1903   ALBUMIN g/dL 4.49 4.22  --   --  3.84  3.84   < > 2.76*   < >  --    BILIRUBIN mg/dL 10.0* 8.1* 9.2*   < > 9.3*   < > 5.5*   < >  --    ALK PHOS U/L 103 198*  --   --  146*   < > 192*   < >  --    AST (SGOT) U/L 113* 105*  --   --  148*   < > 1,567*   < >  --    ALT (SGPT) U/L 234* 349*  --   --  480*   < > 1,456*   < >  --    AMMONIA umol/L  --   --   --   --   --   --   --   --  103*   AMYLASE U/L  --   --   --   --   --   --  226*  --   --    LIPASE U/L  --   --   --   --   --   --  225*  --   --     < > = values in this interval not displayed.       Medications :     albumin human, 25 g, Intravenous, TID  budesonide-formoterol, 2 puff, Inhalation, BID - RT  lactulose, 20 g, Oral, Once  lactulose, 30 g, Oral, TID  nystatin, 5 mL, Swish & Swallow, 4x Daily  pantoprazole, 40 mg, Oral, Q AM  sodium chloride, 10 mL, Intravenous, Q12H  sodium chloride, 10 mL,  Intravenous, Q12H  sodium polystyrene, 30 g, Oral, Once      DOBUTamine, 3 mcg/kg/min, Last Rate: 3 mcg/kg/min (12/17/21 1809)  norepinephrine, 0.02-0.3 mcg/kg/min, Last Rate: Stopped (12/12/21 1643)  Pharmacy Consult,         Assessment/Plan     1.  Acute renal failure with acute tubular necrosis with oliguria  2.  Hypoxic respiratory failure  3.  Cardiogenic shock  4.  Shock liver  5.  Pulmonary embolism  6.  Pneumonia with sepsis  7.  Left ventricular ejection fraction 20%  8.  Atrial fibrillation now with controlled ventricular rate  9.  Hyperkalemia  10. Metabolic encephalopathy  11. Hyponatremia likely hypervolumia    Patient's creatinine is better to 2.81 potassium is also better pt had excellent diuresis response with lasix clinically respiratory status is much better.  Still has swelling Cr started trending down, having adequate blood pressure without pressors. Evaluated by heme, no concern of TMA, likely DIC.   Persistent mild hyperkalemia, will give kayexalate. I will give lasix today as well  Will repeat UPCR to follow up likely overestimated UPCR, get renal USG    CELSO likely ATN due to cardiogenic shock  Baseline Cr unknown, admitted with 1.0  No hematuria, 1.2 G proteinuria.     -repeat BMP in evening  -UPCR  -renal USG    Plan of care was discussed with the patient she understood verbalized agreed      S Armin Ramirez MD  12/18/21  08:59 EST

## 2021-12-18 NOTE — PLAN OF CARE
Goal Outcome Evaluation:  Plan of Care Reviewed With: patient    Pt remains on 3l nc and dobutamine gtt, no acute changes this shift.  Will continue to monitor.

## 2021-12-18 NOTE — PLAN OF CARE
Goal Outcome Evaluation:   Pt. Resting quietly in bed with eyes closed. HOB is up. No SOA noted. VS are noted. Dobutamine infusing at 3mcg/MD orders. Skin CDI. F/C patent to BSD. No fall noted or reported. Denies needs at this time. S/O at bedside.

## 2021-12-18 NOTE — PROGRESS NOTES
[facility] HOSPITALIST PROGRESS NOTE     Patient Identification:  Name:  Tiera Mendoza  Age:  46 y.o.  Sex:  female  :  1975  MRN:  [id number]  Visit Number:  [id number]  ROOM: [address]     Primary Care Provider:  Alan Martin APRN    Length of stay in inpatient status:  8    Subjective     Chief Compliant:    Chief Complaint   Patient presents with   • Shortness of Breath       History of Presenting Illness: Patient seen and evaluated in follow-up for acute hypoxemic hypercarbic respiratory failure secondary to sepsis with right-sided pneumonia and pulmonary embolism and acute severe cardiomyopathy with cardiogenic shock now progressed to being in DIC.  Patient today alert and conversant but states breathing feels worse.  Discussed her overall poor prognosis and no longer being candidate for transfer to .     Objective     Current Hospital Meds:albumin human, 25 g, Intravenous, TID  budesonide-formoterol, 2 puff, Inhalation, BID - RT  furosemide, 80 mg, Intravenous, Q12H  lactulose, 20 g, Oral, Once  lactulose, 30 g, Oral, TID  nystatin, 5 mL, Swish & Swallow, 4x Daily  pantoprazole, 40 mg, Oral, Q AM  sodium chloride, 10 mL, Intravenous, Q12H  sodium chloride, 10 mL, Intravenous, Q12H  sodium polystyrene, 30 g, Oral, Once    DOBUTamine, 3 mcg/kg/min, Last Rate: 3 mcg/kg/min (21 1809)  norepinephrine, 0.02-0.3 mcg/kg/min, Last Rate: Stopped (21 1643)  Pharmacy Consult,         Current Antimicrobial Therapy:  Anti-Infectives (From admission, onward)    Ordered     Dose/Rate Route Frequency Start Stop    21 0319  meropenem (MERREM) 1 g in sodium chloride 0.9 % 100 mL IVPB-VTB        Ordering Provider: Gabriele Bo MD    1 g  over 30 Minutes Intravenous Once 21 0430 21 0435    21 1539  piperacillin-tazobactam (ZOSYN) IVPB 3.375 g in 100 mL NS VTB        Ordering Provider: Natanael Toscano MD    3.375 g  over 30 Minutes Intravenous Once 21  1700 12/09/21 1637    12/09/21 0425  piperacillin-tazobactam (ZOSYN) IVPB 4.5 g in 100 mL NS VTB        Ordering Provider: Francisco Naranjo MD    4.5 g  over 30 Minutes Intravenous Once 12/09/21 0427 12/09/21 0556    12/09/21 0329  cefTRIAXone (ROCEPHIN) 2 g in sodium chloride 0.9 % 100 mL IVPB-VTB        Ordering Provider: Francisco Naranjo MD    2 g  200 mL/hr over 30 Minutes Intravenous Once 12/09/21 0331 12/09/21 0444        Current Diuretic Therapy:  Diuretics (From admission, onward)    Ordered     Dose/Rate Route Frequency Start Stop    12/17/21 1350  furosemide (LASIX) injection 80 mg        Ordering Provider: Kirstin Esparza MD    80 mg Intravenous Every 12 Hours 12/17/21 1700 12/18/21 1659    12/16/21 1236  furosemide (LASIX) injection 60 mg        Ordering Provider: Kirstin Esparza MD    60 mg Intravenous Once 12/16/21 1600 12/16/21 1718    12/14/21 1000  furosemide (LASIX) injection 20 mg        Ordering Provider: Kirstin Esparza MD    20 mg Intravenous Once 12/14/21 1100 12/14/21 1132    12/14/21 1000  furosemide (LASIX) 100 mg in sodium chloride 0.9 % 50 mL IVPB        Ordering Provider: Kirstin Esparza MD    100 mg  6.5 mL/hr over 10 Hours Intravenous Once 12/14/21 1100 12/14/21 2135        ----------------------------------------------------------------------------------------------------------------------  Vital Signs:  Temp:  [96.3 °F (35.7 °C)-98.9 °F (37.2 °C)] 96.3 °F (35.7 °C)  Heart Rate:  [112-147] 147  Resp:  [16-22] 20  BP: ()/() 102/86  SpO2:  [90 %-99 %] 90 %  on  Flow (L/min):  [2-2.5] 2;   Device (Oxygen Therapy): nasal cannula  Body mass index is 44.58 kg/m².    Wt Readings from Last 3 Encounters:   12/17/21 133 kg (293 lb 3.2 oz)   12/03/20 126 kg (276 lb 12.8 oz)   01/01/19 118 kg (260 lb)     Intake & Output (last 3 days)       12/15 0701  12/16 0700 12/16 0701  12/17 0700 12/17 0701  12/18 0700    P.O. 80  236    I.V. (mL/kg) 68.6 (0.5)      Blood 537 147      IV Piggyback 200 250 100    Total Intake(mL/kg) 885.6 (6.6) 397 (3) 336 (2.5)    Urine (mL/kg/hr) 1050 (0.3) 2450 (0.8) 600 (0.4)    Stool 0 0 0    Total Output 1050 2450 600    Net -164.5 -2053 -264           Stool Unmeasured Occurrence 1 x 2 x 2 x        Diet Clear Liquid  ----------------------------------------------------------------------------------------------------------------------  Physical exam:  Constitutional: Morbidly obese adult female resting in bed, NAD  HENT:  Head:  Normocephalic and atraumatic.  Mouth:  Moist mucous membranes.    Eyes:  Conjunctivae and EOM are normal. No scleral icterus.     Cardiovascular: Irregularly irregular with no murmur, rub, or gallop  Pulmonary/Chest: Decreased breath sounds throughout with poor air movement.  Abdominal:  Soft.  Bowel sounds are normal.  No distension and no tenderness.   Musculoskeletal:  No edema, no tenderness, and no deformity.    Neurological: Somnolent but arousable and interactive.  No focal neurological deficits on limited exam  Skin:  Skin is warm and dry. No rash or lesion noted. No pallor.   Peripheral vascular:  Pulses in all 4 extremities with no clubbing, no cyanosis.    ----------------------------------------------------------------------------------------------------------------------  Tele:    ----------------------------------------------------------------------------------------------------------------------  Results from last 7 days   Lab Units 12/17/21  1723 12/17/21  1722 12/17/21  1149 12/17/21  0551 12/17/21  0550 12/17/21  0054 12/17/21  0054 12/16/21  1735 12/16/21  1734 12/16/21  1149 12/16/21  0535 12/16/21  0535 12/14/21  0401 12/13/21  0356 12/12/21  0057 12/12/21  0057 12/11/21  0444 12/10/21  2251   CRP mg/dL  --   --   --   --   --   --   --   --   --   --   --   --   --  16.36*  --  22.87*  --  27.13*   LACTATE mmol/L  --   --   --   --   --   --   --   --   --   --   --   --   --   --   --  3.9*  --   --    WBC  10*3/mm3  --  25.13* 23.56*  --  23.23*   < > 22.98*   < >  --  23.75*   < > 24.88*   < > 26.58*   < > 27.26*  --  31.70*   HEMOGLOBIN g/dL  --  9.0* 9.1*  --  8.9*   < > 8.9*   < >  --  9.5*   < > 9.1*   < > 10.0*   < > 10.5*  --  9.8*   HEMATOCRIT %  --  29.5* 29.6*  --  28.9*   < > 28.3*   < >  --  30.2*   < > 28.9*   < > 32.1*   < > 34.7  --  33.7*   MCV fL  --  78.5* 79.8  --  77.5*   < > 76.7*   < >  --  77.2*   < > 76.3*   < > 78.1*   < > 81.6  --  84.0   MCHC g/dL  --  30.5* 30.7*  --  30.8*   < > 31.4*   < >  --  31.5   < > 31.5   < > 31.2*   < > 30.3*  --  29.1*   PLATELETS 10*3/mm3  --  25* 25*  --  23*   < > 25*   < >  --  30*   < > 29*   < > 134*   < > 198  --  256   INR  3.02*  --  3.18* 2.85*  --   --  2.05*  --  2.80* 2.84*  --  2.70*   < >  --   --  3.62*   < >  --     < > = values in this interval not displayed.     Results from last 7 days   Lab Units 12/16/21  0244   PH, ARTERIAL pH units 7.410   PO2 ART mm Hg 57.1*   PCO2, ARTERIAL mm Hg 38.0   HCO3 ART mmol/L 24.0     Results from last 7 days   Lab Units 12/17/21  1149 12/17/21  0054 12/16/21  1149 12/16/21  0739 12/16/21  0535 12/15/21  2351 12/15/21  0534 12/15/21  0534 12/11/21  0124 12/10/21  2251   SODIUM mmol/L 138 139  --  138  138   < > 135*   < > 134*  134*   < >  --    POTASSIUM mmol/L 6.2* 6.1*  --  6.1*  6.1*   < > 6.1*   < > 5.2  5.2   < >  --    MAGNESIUM mg/dL  --   --   --   --   --   --   --  3.1*  --  2.5   CHLORIDE mmol/L 97* 98  --  97*  97*   < > 96*   < > 98  98   < >  --    CO2 mmol/L 16.1* 16.7*  --  20.6*  20.6*   < > 16.7*   < > 22.2  22.2   < >  --    BUN mg/dL 122* 122*  --  126*  126*   < > 124*   < > 111*  111*   < >  --    CREATININE mg/dL 2.71* 2.86*  --  3.11*  3.11*   < > 3.15*   < > 3.35*  3.35*   < >  --    EGFR IF NONAFRICN AM mL/min/1.73 19* 18*  --  16*  16*   < > 16*   < > 15*  15*   < >  --    CALCIUM mg/dL 9.3 9.0  --  9.1  9.1   < > 9.0   < > 8.4*  8.4*   < >  --    PHOSPHORUS mg/dL  --    --   --  7.2*  --  7.0*  --  6.5*   < >  --    GLUCOSE mg/dL 110* 128*  --  151*  148*   < > 134*   < > 146*  146*   < >  --    ALBUMIN g/dL  --  4.22  --  3.84  3.84  --  3.89   < > 3.17*  3.17*   < >  --    BILIRUBIN mg/dL  --  8.1* 9.2* 9.3*  --   --    < > 6.3*   < >  --    ALK PHOS U/L  --  198*  --  146*  --   --   --  185*   < >  --    AST (SGOT) U/L  --  105*  --  148*  --   --   --  337*   < >  --    ALT (SGPT) U/L  --  349*  --  480*  --   --   --  775*   < >  --     < > = values in this interval not displayed.   Estimated Creatinine Clearance: 37.9 mL/min (A) (by C-G formula based on SCr of 2.71 mg/dL (H)).  No results found for: AMMONIA  Results from last 7 days   Lab Units 12/13/21  0356 12/12/21  0057 12/11/21  0445 12/11/21  0124 12/10/21  2251   CK TOTAL U/L 859* 1,495* 2,338*  --    < >   TROPONIN T ng/mL  --   --  0.019 0.015  --     < > = values in this interval not displayed.             Glucose   Date/Time Value Ref Range Status   12/17/2021 0622 133 (H) 70 - 130 mg/dL Final     Comment:     Meter: [id number] : [id number] bhumika lambert   12/16/2021 1643 152 (H) 70 - 130 mg/dL Final     Comment:     Meter: [id number] : [id number] ELIANE TONG   12/16/2021 1221 207 (H) 70 - 130 mg/dL Final     Comment:     Meter: [id number] : [id number] Abedean Peacockmaddie VILLAGOMEZ   12/16/2021 0546 171 (H) 70 - 130 mg/dL Final     Comment:     Meter: [id number] : [id number] Shira Edmondson   12/16/2021 0034 153 (H) 70 - 130 mg/dL Final     Comment:     Meter: [id number] : [id number] Noah Walsh   12/15/2021 1705 138 (H) 70 - 130 mg/dL Final     Comment:     Meter: [id number] : [id number] Frances Margi   12/15/2021 1336 232 (H) 70 - 130 mg/dL Final     Comment:     Meter: [id number] : [id number] Frances Margi   12/15/2021 0559 135 (H) 70 - 130 mg/dL Final     Comment:     Meter: [id number] : [id number] Noah Walsh     Lab Results   Component Value Date    TSH 4.040 12/10/2021    FREET4  0.81 (L) 12/10/2021     Lab Results   Component Value Date    PREGTESTUR Negative 12/03/2020     Pain Management Panel     Pain Management Panel Latest Ref Rng & Units 12/11/2021 12/11/2021    CREATININE UR mg/dL 108.5 108.5    AMPHETAMINES SCREEN, URINE Negative - -    BARBITURATES SCREEN Negative - -    BENZODIAZEPINE SCREEN, URINE Negative - -    BUPRENORPHINEUR Negative - -    COCAINE SCREEN, URINE Negative - -    METHADONE SCREEN, URINE Negative - -    METHAMPHETAMINEUR Negative - -        Brief Urine Lab Results  (Last result in the past 365 days)      Color   Clarity   Blood   Leuk Est   Nitrite   Protein   CREAT   Urine HCG        12/11/21 1328             108.5         12/11/21 1328             108.5             Blood Culture   Date Value Ref Range Status   12/09/2021 No growth at 4 days  Preliminary   12/09/2021 No growth at 4 days  Preliminary     No results found for: URINECX  No results found for: WOUNDCX  No results found for: STOOLCX  No results found for: RESPCX  No results found for: AFBCX  Results from last 7 days   Lab Units 12/13/21  0356 12/12/21  0057 12/10/21  2251   LACTATE mmol/L  --  3.9*  --    CRP mg/dL 16.36* 22.87* 27.13*       I have personally looked at the labs and they are summarized above.  ----------------------------------------------------------------------------------------------------------------------  Detailed radiology reports for the last 24 hours:    Imaging Results (Last 24 Hours)     Procedure Component Value Units Date/Time    US Renal Bilateral [[id number]] Collected: 12/17/21 1844     Updated: 12/17/21 1846    Narrative:      US Renal Comp    HISTORY:  Acute kidney insufficiency. Sepsis.    COMPARISON:    None available.    TECHNIQUE:  Grayscale ultrasound imaging of both kidneys and the urinary bladder was performed. Exam technically limited secondary to patient body habitus and overlying bowel gas artifact.    FINDINGS:  Both kidneys are normal in size and  ultrasound appearance. No evidence of urinary obstruction. No visible nephrolithiasis, renal mass, perinephric fluid collection or renal parenchymal scarring. The urinary bladder is within normal limits. Renal length  measures 10.1 cm on the right and 10.3 cm on the left.      Impression:      Negative bilateral renal ultrasound examination.    Signer Name: Tomas Carrillo MD   Signed: 12/17/2021 6:44 PM   Workstation Name: [email or web address]    [facility]        Assessment & Plan      Cardiogenic shock  Acute hypoxemic hypercarbic respiratory failure  Acute pulmonary embolism  Lactic acidemia    -Heparin GTT held secondary to thrombocytopenia and DIC    -Remains off Levophed and dobutamine at this time    -Pleated appropriate course of antibiotic therapy for pneumonia    -Patient with increased oxygen requirements now up to 2 L, patient on auscultation with crackles concerning for worsening pulmonary edema.    -Cardiology consulted and following along, appreciate input and help unable to initiate guideline directed medical therapy at this time due to hypotension and renal failure.      -Patient previously accepted to [facility] for by heart failure/transplant team after discussion with cardiology however today and rediscussion with the transfer team and then with heart failure service and cardiology with patient's development of DIC in the setting of her cirrhosis and previous drug abuse is no longer a candidate for transfer as they would not perform any interventions at this time.  Patient is now off of the transfer list for .  Discussed this with patient at bedside as well as her significant other and again with her daughter stressing the severity of patient's current illness and her poor prognosis.  Patient wishes to remain full code and continue every available measure at this time.    -TTE shows EF less than 20% with severely decreased LV systolic function with moderately dilated  left ventricular cavity.  No pericardial effusion.  A repeat limited echocardiogram to reassess valvular function and EF has been ordered given patient's titration off of dobutamine and Levophed.    Acute thrombocytopenia  Disseminated intravascular coagulation    -Patient with development of thrombocytopenia this morning initially concerning for HIT however fibrinogen level and D-dimer level checked and less than 100 and greater than 20 respectively with an LDH greater than 700 highly consistent concerning for DIC despite no clinical evidence of bleeding at this time from any IV sites.    -Hematology/oncology consultation obtained given complicated situation given the fact the patient-now has a contraindication anticoagulation but also at the same time has a pulmonary embolism as well as having evidence of still ongoing liver dysfunction and cirrhosis with also atrial fibrillation/flutter.    -Transfuse for platelet count of 10-20,000 or platelet count less than 50,000 with signs of active bleeding or going for any procedures.    -Continue every 6 hour checks of hemoglobin, platelets, dimer, fibrinogen, LDH.    -Patient is essentially multiorgan failure at this point despite her respiratory status is at high risk for further decompensation and ultimately death.     -We will continue to administer cryoprecipitate to attempt to maintain fibrinogen level greater than 100.    Acute kidney injury  Rhabdomyolysis  Hyperkalemia    -Suspected secondary to ATN and possible contrast-induced nephropathy.  Creatinine now trending back down at this time    -Nephrology consulted and following along, appreciate input and help    -Continue diuresis    Cirrhosis  Hyperbilirubinemia  Shock Liver with acute liver failure  Metabolic encephalopathy    -Continue lactulose, 30 g 3 times daily    -Liver enzymes have improved however patient's INR remains greater than 1.5, she has hyperbilirubinemia with no evidence of any obstructive  process on repeat ultrasound and has still some mild encephalopathy.  Overall despite improving LFTs and now with her being in DIC patient's overall presentation is consistent with liver failure and her prognosis is quite poor.    Polysubstance abuse    -Patient positive for methamphetamines at admission    -Complicates all aspects of care and could be a significant contributor to patient's current situation and DSE.      VTE Prophylaxis:   Mechanical Order History:     None      Pharmalogical Order History:      Ordered     Dose Route Frequency Stop    12/09/21 0425  heparin (porcine) 5000 UNIT/ML injection 5,000 Units         5,000 Units IV Once 12/09/21 0449    12/09/21 0425  heparin 97930 units/250 mL (100 units/mL) in 0.45 % NaCl infusion  9.99 mL/hr         7.87 Units/kg/hr IV Titrated --    12/09/21 0425  heparin (porcine) 5000 UNIT/ML injection 5,000 Units         5,000 Units IV As Needed --    12/09/21 0425  heparin (porcine) 5000 UNIT/ML injection 2,500 Units         2,500 Units IV As Needed --                Disposition taken off of [facility] transfer list due to no longer qualifying for any higher level of care intervention.  Patient's prognosis is very poor and disposition unclear at this time as patient is a extremely high risk for death.    Isael Barclay,   [facility]  12/17/21  19:26 EST

## 2021-12-19 NOTE — PLAN OF CARE
Goal Outcome Evaluation:  Plan of Care Reviewed With: patient   Progress: no change     Pt VS stable, afebrile, O2 @ 6L NC. Pt HR elevated to 160 earlier in shift, Dr Sellers notified, new orders received, see MAR. Phenylephrine gtt currently infusing @ 1.4 mcg/kg/hr, current /90. Pt is restless and moans out often, when asked what is wrong she cannot be specific about her needs. Pt has no other complaints at this time, continuing to monitor.      Problem: Adult Inpatient Plan of Care  Goal: Plan of Care Review  Outcome: Ongoing, Progressing  Flowsheets (Taken 12/19/2021 0416)  Progress: no change  Plan of Care Reviewed With: patient  Goal: Patient-Specific Goal (Individualized)  Outcome: Ongoing, Progressing  Goal: Absence of Hospital-Acquired Illness or Injury  Outcome: Ongoing, Progressing  Intervention: Identify and Manage Fall Risk  Recent Flowsheet Documentation  Taken 12/19/2021 0100 by Debora Castillo RN  Safety Promotion/Fall Prevention:   safety round/check completed   activity supervised  Taken 12/18/2021 2300 by Debora Castillo RN  Safety Promotion/Fall Prevention:   safety round/check completed   activity supervised  Taken 12/18/2021 2100 by Debora Castillo RN  Safety Promotion/Fall Prevention:   safety round/check completed   activity supervised  Taken 12/18/2021 1900 by Debora Castillo RN  Safety Promotion/Fall Prevention:   safety round/check completed   activity supervised  Intervention: Prevent Skin Injury  Recent Flowsheet Documentation  Taken 12/19/2021 0200 by Debora Castillo RN  Skin Protection:   tubing/devices free from skin contact   adhesive use limited  Taken 12/18/2021 2000 by Debora Castillo RN  Skin Protection:   tubing/devices free from skin contact   adhesive use limited  Intervention: Prevent and Manage VTE (venous thromboembolism) Risk  Recent Flowsheet Documentation  Taken 12/19/2021 0200 by Debora Castillo RN  VTE Prevention/Management: bleeding risk factor(s)  identified  Intervention: Prevent Infection  Recent Flowsheet Documentation  Taken 12/19/2021 0100 by Debora Castillo RN  Infection Prevention: rest/sleep promoted  Taken 12/18/2021 2300 by Debora Castillo RN  Infection Prevention: rest/sleep promoted  Taken 12/18/2021 2100 by Debora Castillo RN  Infection Prevention: rest/sleep promoted  Taken 12/18/2021 1900 by Debora Castillo RN  Infection Prevention: rest/sleep promoted  Goal: Optimal Comfort and Wellbeing  Outcome: Ongoing, Progressing  Intervention: Provide Person-Centered Care  Recent Flowsheet Documentation  Taken 12/19/2021 0200 by Debora Castillo RN  Trust Relationship/Rapport:   care explained   choices provided   thoughts/feelings acknowledged  Taken 12/18/2021 2000 by Debora Castillo RN  Trust Relationship/Rapport:   care explained   choices provided   thoughts/feelings acknowledged  Goal: Readiness for Transition of Care  Outcome: Ongoing, Progressing     Problem: Fall Injury Risk  Goal: Absence of Fall and Fall-Related Injury  Outcome: Ongoing, Progressing  Intervention: Identify and Manage Contributors to Fall Injury Risk  Recent Flowsheet Documentation  Taken 12/19/2021 0100 by Debora Castillo RN  Medication Review/Management: medications reviewed  Taken 12/18/2021 2300 by Debora Castillo RN  Medication Review/Management: medications reviewed  Taken 12/18/2021 2100 by Debora Castillo RN  Medication Review/Management: medications reviewed  Taken 12/18/2021 1900 by Debora Castillo RN  Medication Review/Management: medications reviewed  Intervention: Promote Injury-Free Environment  Recent Flowsheet Documentation  Taken 12/19/2021 0100 by Debora Castillo RN  Safety Promotion/Fall Prevention:   safety round/check completed   activity supervised  Taken 12/18/2021 2300 by Debora Castillo RN  Safety Promotion/Fall Prevention:   safety round/check completed   activity supervised  Taken 12/18/2021 2100 by Debora Castillo RN  Safety Promotion/Fall  Prevention:   safety round/check completed   activity supervised  Taken 12/18/2021 1900 by Debora Castillo RN  Safety Promotion/Fall Prevention:   safety round/check completed   activity supervised     Problem: Skin Injury Risk Increased  Goal: Skin Health and Integrity  Outcome: Ongoing, Progressing  Intervention: Optimize Skin Protection  Recent Flowsheet Documentation  Taken 12/19/2021 0200 by Debora Castillo RN  Pressure Reduction Techniques: weight shift assistance provided  Pressure Reduction Devices: pressure-redistributing mattress utilized  Skin Protection:   tubing/devices free from skin contact   adhesive use limited  Taken 12/18/2021 2000 by Debora Castillo RN  Pressure Reduction Techniques: weight shift assistance provided  Pressure Reduction Devices: pressure-redistributing mattress utilized  Skin Protection:   tubing/devices free from skin contact   adhesive use limited

## 2021-12-19 NOTE — NURSING NOTE
Patient's Dad Guille Neal called about patient's condition.  He said he would be to the hospital soon.  Patient's dad was instructed to come through the ER doors and report to the desk at the ER.  The  at the desk will call the PCU lead nurse and we will send someone to bring him to the floor.  Dr. Sellers wanted the family to go the the CCU waiting room where she could talk to him.

## 2021-12-19 NOTE — PROGRESS NOTES
[facility] HOSPITALISTS CROSS COVER NOTE    Patient Identification:  Name:  Tiera Mendoza  Age:  46 y.o.  Sex:  female  :  1975  MRN:  [id number]  Visit number:  [id number]  Primary Care Provider:  Alan Martin APRN    Length of stay in inpatient status:  9    Brief Update     Called about the patient having tachycardia.  Gave her 2 separate doses of 5 mg IV metoprolol with some improvement in her heart rates.  We did change the vasopressors to just Frantz-Synephrine to decrease the chance of tachycardia.  Then, about 6:15 AM, the patient experienced a CODE BLUE.  It appeared to be a respiratory code as the patient had blood streaming out of the ET tube after she was intubated.  12 minutes of chest compressions were provided and the patient had return of spontaneous circulation.  Stat platelet level was 15,000; I have written for 2 units of platelets to be transfused.  Family is en route as the patient is high risk of dying from the DIC.    Madiha Sellers MD  [facility]ist  21  23:37 EST

## 2021-12-19 NOTE — PROGRESS NOTES
Nephrology Progress Note      Patient has been pronounced  when I came to see the patient      S Armin Ramirez MD  21  09:24 EST

## 2021-12-19 NOTE — PROGRESS NOTES
[facility] HOSPITALIST PROGRESS NOTE     Patient Identification:  Name:  Tiera Mendoza  Age:  46 y.o.  Sex:  female  :  1975  MRN:  [id number]  Visit Number:  [id number]  ROOM: [address]     Primary Care Provider:  Alan Martin APRN    Length of stay in inpatient status:  9    Subjective     Chief Compliant:    Chief Complaint   Patient presents with   • Shortness of Breath       History of Presenting Illness: Patient seen and evaluated in follow-up for acute hypoxemic hypercarbic respiratory failure secondary to sepsis with right-sided pneumonia and pulmonary embolism and acute severe cardiomyopathy with cardiogenic shock now progressed to being in DIC.  Patient today alert and conversant but states breathing still feels poor.    Objective     Current Hospital Meds:albumin human, 25 g, Intravenous, TID  budesonide-formoterol, 2 puff, Inhalation, BID - RT  lactulose, 20 g, Oral, Once  lactulose, 30 g, Oral, TID  nystatin, 5 mL, Swish & Swallow, 4x Daily  pantoprazole, 40 mg, Oral, Q AM  sodium chloride, 10 mL, Intravenous, Q12H  sodium chloride, 10 mL, Intravenous, Q12H  sodium polystyrene, 30 g, Oral, Once    DOBUTamine, 3 mcg/kg/min, Last Rate: 3 mcg/kg/min (21 1425)  norepinephrine, 0.02-0.3 mcg/kg/min, Last Rate: Stopped (21 1643)  Pharmacy Consult,         Current Antimicrobial Therapy:  Anti-Infectives (From admission, onward)    Ordered     Dose/Rate Route Frequency Start Stop    21 0319  meropenem (MERREM) 1 g in sodium chloride 0.9 % 100 mL IVPB-VTB        Ordering Provider: Gabriele Bo MD    1 g  over 30 Minutes Intravenous Once 21 0430 21 0435    21 1539  piperacillin-tazobactam (ZOSYN) IVPB 3.375 g in 100 mL NS VTB        Ordering Provider: Natanael Toscano MD    3.375 g  over 30 Minutes Intravenous Once 21 1700 21 1637    21 0425  piperacillin-tazobactam (ZOSYN) IVPB 4.5 g in 100 mL NS VTB        Ordering Provider:  Francisco Naranjo MD    4.5 g  over 30 Minutes Intravenous Once 12/09/21 0427 12/09/21 0556    12/09/21 0329  cefTRIAXone (ROCEPHIN) 2 g in sodium chloride 0.9 % 100 mL IVPB-VTB        Ordering Provider: Francisco Naranjo MD    2 g  200 mL/hr over 30 Minutes Intravenous Once 12/09/21 0331 12/09/21 0444        Current Diuretic Therapy:  Diuretics (From admission, onward)    Ordered     Dose/Rate Route Frequency Start Stop    12/17/21 1350  furosemide (LASIX) injection 80 mg        Ordering Provider: Kirstin Esparza MD    80 mg Intravenous Every 12 Hours 12/17/21 1700 12/18/21 0626    12/16/21 1236  furosemide (LASIX) injection 60 mg        Ordering Provider: Kirstin Esparza MD    60 mg Intravenous Once 12/16/21 1600 12/16/21 1718    12/14/21 1000  furosemide (LASIX) injection 20 mg        Ordering Provider: Kirstin Esparza MD    20 mg Intravenous Once 12/14/21 1100 12/14/21 1132    12/14/21 1000  furosemide (LASIX) 100 mg in sodium chloride 0.9 % 50 mL IVPB        Ordering Provider: Kirstin Esparza MD    100 mg  6.5 mL/hr over 10 Hours Intravenous Once 12/14/21 1100 12/14/21 2135        ----------------------------------------------------------------------------------------------------------------------  Vital Signs:  Temp:  [97 °F (36.1 °C)-98 °F (36.7 °C)] 97.6 °F (36.4 °C)  Heart Rate:  [121-154] 142  Resp:  [14-22] 18  BP: ()/(67-99) 130/93  SpO2:  [88 %-99 %] 92 %  on  Flow (L/min):  [3-4] 3;   Device (Oxygen Therapy): nasal cannula  Body mass index is 44.58 kg/m².    Wt Readings from Last 3 Encounters:   12/17/21 133 kg (293 lb 3.2 oz)   12/03/20 126 kg (276 lb 12.8 oz)   01/01/19 118 kg (260 lb)     Intake & Output (last 3 days)       12/16 0701  12/17 0700 12/17 0701  12/18 0700 12/18 0701  12/19 0700    P.O.  1136     I.V. (mL/kg)  343.9 (2.6) 132.9 (1)    Blood 147  420    IV Piggyback 250 200 200    Total Intake(mL/kg) 397 (3) 1679.9 (12.6) 752.9 (5.7)    Urine (mL/kg/hr) 2450 (0.8) 1615  (0.5) 1000 (0.6)    Stool 0 0     Total Output 2450 1615 1000    Net -2053 +64.9 -247.1           Stool Unmeasured Occurrence 2 x 2 x         Diet Clear Liquid  ----------------------------------------------------------------------------------------------------------------------  Physical exam:  Constitutional: Morbidly obese adult female resting in bed, NAD  HENT:  Head:  Normocephalic and atraumatic.  Mouth:  Moist mucous membranes.    Eyes:  Conjunctivae and EOM are normal. No scleral icterus.     Cardiovascular: Irregularly irregular with no murmur, rub, or gallop  Pulmonary/Chest: Decreased breath sounds throughout with poor air movement.  Abdominal:  Soft.  Bowel sounds are normal.  No distension and no tenderness.   Musculoskeletal:  No edema, no tenderness, and no deformity.    Neurological: Somnolent but arousable and interactive.  No focal neurological deficits on limited exam  Skin:  Skin is warm and dry. No rash or lesion noted. No pallor.   Peripheral vascular:  Pulses in all 4 extremities with no clubbing, no cyanosis.    ----------------------------------------------------------------------------------------------------------------------  Tele:    ----------------------------------------------------------------------------------------------------------------------  Results from last 7 days   Lab Units 12/18/21  1736 12/18/21  1131 12/18/21  0552 12/18/21  0021 12/17/21  1723 12/17/21  1722 12/17/21  1149 12/17/21  0551 12/17/21  0550 12/14/21  0401 12/13/21  0356 12/12/21  0057 12/12/21  0057   CRP mg/dL  --   --   --   --   --   --   --   --   --   --  16.36*  --  22.87*   LACTATE mmol/L  --   --   --   --   --   --   --   --   --   --   --   --  3.9*   WBC 10*3/mm3 21.77* 20.29* 19.24*  --   --    < > 23.56*  --    < >   < > 26.58*   < > 27.26*   HEMOGLOBIN g/dL 7.6* 8.0* 7.9*  --   --    < > 9.1*  --    < >   < > 10.0*   < > 10.5*   HEMATOCRIT % 25.0* 26.3* 25.4*  --   --    < > 29.6*  --    < >    < > 32.1*   < > 34.7   MCV fL 79.6 79.9 78.2*  --   --    < > 79.8  --    < >   < > 78.1*   < > 81.6   MCHC g/dL 30.4* 30.4* 31.1*  --   --    < > 30.7*  --    < >   < > 31.2*   < > 30.3*   PLATELETS 10*3/mm3 39* 14* 14*  --   --    < > 25*  --    < >   < > 134*   < > 198   INR  2.42* 3.37* 3.27* 3.48* 3.02*  --  3.18* 2.85*  --    < >  --   --  3.62*    < > = values in this interval not displayed.     Results from last 7 days   Lab Units 12/16/21  0244   PH, ARTERIAL pH units 7.410   PO2 ART mm Hg 57.1*   PCO2, ARTERIAL mm Hg 38.0   HCO3 ART mmol/L 24.0     Results from last 7 days   Lab Units 12/18/21  0021 12/17/21  1900 12/17/21  1149 12/17/21  0054 12/17/21  0054 12/16/21  1149 12/16/21  0739 12/16/21  0739 12/16/21  0535 12/15/21  2351 12/15/21  0534 12/15/21  0534   SODIUM mmol/L 143 142 138   < > 139  --    < > 138  138   < > 135*   < > 134*  134*   POTASSIUM mmol/L 5.6* 6.1* 6.2*   < > 6.1*  --    < > 6.1*  6.1*   < > 6.1*   < > 5.2  5.2   MAGNESIUM mg/dL  --   --   --   --   --   --   --   --   --   --   --  3.1*   CHLORIDE mmol/L 99 97* 97*   < > 98  --    < > 97*  97*   < > 96*   < > 98  98   CO2 mmol/L 22.3 19.1* 16.1*   < > 16.7*  --    < > 20.6*  20.6*   < > 16.7*   < > 22.2  22.2   BUN mg/dL 129* 124* 122*   < > 122*  --    < > 126*  126*   < > 124*   < > 111*  111*   CREATININE mg/dL 2.81* 2.92* 2.71*   < > 2.86*  --    < > 3.11*  3.11*   < > 3.15*   < > 3.35*  3.35*   EGFR IF NONAFRICN AM mL/min/1.73 18* 17* 19*   < > 18*  --    < > 16*  16*   < > 16*   < > 15*  15*   CALCIUM mg/dL 9.4 9.5 9.3   < > 9.0  --    < > 9.1  9.1   < > 9.0   < > 8.4*  8.4*   PHOSPHORUS mg/dL  --   --   --   --   --   --   --  7.2*  --  7.0*  --  6.5*   GLUCOSE mg/dL 154* 93 110*   < > 128*  --    < > 151*  148*   < > 134*   < > 146*  146*   ALBUMIN g/dL 4.49  --   --   --  4.22  --   --  3.84  3.84  --  3.89   < > 3.17*  3.17*   BILIRUBIN mg/dL 10.0*  --   --   --  8.1* 9.2*   < > 9.3*  --   --    < >  6.3*   ALK PHOS U/L 103  --   --   --  198*  --   --  146*  --   --    < > 185*   AST (SGOT) U/L 113*  --   --   --  105*  --   --  148*  --   --    < > 337*   ALT (SGPT) U/L 234*  --   --   --  349*  --   --  480*  --   --    < > 775*    < > = values in this interval not displayed.   Estimated Creatinine Clearance: 36.5 mL/min (A) (by C-G formula based on SCr of 2.81 mg/dL (H)).  No results found for: AMMONIA  Results from last 7 days   Lab Units 12/13/21  0356 12/12/21  0057   CK TOTAL U/L 859* 1,495*             Glucose   Date/Time Value Ref Range Status   12/18/2021 1823 149 (H) 70 - 130 mg/dL Final     Comment:     Meter: [id number] : [id number] Pierreslim Reardon Hilda   12/18/2021 1222 180 (H) 70 - 130 mg/dL Final     Comment:     Meter: [id number] : [id number] Pierre Stevens   12/18/2021 0636 174 (H) 70 - 130 mg/dL Final     Comment:     Meter: [id number] : [id number] DARÍO RAVI   12/18/2021 0104 183 (H) 70 - 130 mg/dL Final     Comment:     Meter: [id number] : [id number] LASHAY WILEY   12/17/2021 0622 133 (H) 70 - 130 mg/dL Final     Comment:     Meter: [id number] : [id number] bhumika lambert   12/16/2021 1643 152 (H) 70 - 130 mg/dL Final     Comment:     Meter: [id number] : [id number] ELIANE TONG   12/16/2021 1221 207 (H) 70 - 130 mg/dL Final     Comment:     Meter: [id number] : [id number] Abe VILLAGOMEZ   12/16/2021 0546 171 (H) 70 - 130 mg/dL Final     Comment:     Meter: [id number] : [id number] Shira Edmondson     Lab Results   Component Value Date    TSH 4.040 12/10/2021    FREET4 0.81 (L) 12/10/2021     Lab Results   Component Value Date    PREGTESTUR Negative 12/03/2020     Pain Management Panel     Pain Management Panel Latest Ref Rng & Units 12/17/2021 12/11/2021    CREATININE UR mg/dL 68.8 108.5    AMPHETAMINES SCREEN, URINE Negative - -    BARBITURATES SCREEN Negative - -    BENZODIAZEPINE SCREEN, URINE Negative - -    BUPRENORPHINEUR Negative - -     COCAINE SCREEN, URINE Negative - -    METHADONE SCREEN, URINE Negative - -    METHAMPHETAMINEUR Negative - -        Brief Urine Lab Results  (Last result in the past 365 days)      Color   Clarity   Blood   Leuk Est   Nitrite   Protein   CREAT   Urine HCG        12/17/21 1301             68.8             Blood Culture   Date Value Ref Range Status   12/09/2021 No growth at 4 days  Preliminary   12/09/2021 No growth at 4 days  Preliminary     No results found for: URINECX  No results found for: WOUNDCX  No results found for: STOOLCX  No results found for: RESPCX  No results found for: AFBCX  Results from last 7 days   Lab Units 12/13/21  0356 12/12/21  0057   LACTATE mmol/L  --  3.9*   CRP mg/dL 16.36* 22.87*       I have personally looked at the labs and they are summarized above.  ----------------------------------------------------------------------------------------------------------------------  Detailed radiology reports for the last 24 hours:    Imaging Results (Last 24 Hours)     ** No results found for the last 24 hours. **        Assessment & Plan      Cardiogenic shock  Acute hypoxemic hypercarbic respiratory failure  Acute pulmonary embolism  Lactic acidemia    -Heparin GTT held secondary to thrombocytopenia and DIC    -Pleated appropriate course of antibiotic therapy for pneumonia    -Patient with increased oxygen requirements now up to 2 L, patient on auscultation with crackles concerning for worsening pulmonary edema.    -Cardiology consulted and following along, appreciate input and help unable to initiate guideline directed medical therapy at this time due to hypotension and renal failure.      -Patient previously accepted to [facility] for by heart failure/transplant team after discussion with cardiology however today and rediscussion with the transfer team and then with heart failure service and cardiology with patient's development of DIC in the setting of her cirrhosis and previous drug  abuse is no longer a candidate for transfer as they would not perform any interventions at this time.  Patient is now off of the transfer list for .  Discussed this with patient at bedside as well as her significant other and again with her daughter stressing the severity of patient's current illness and her poor prognosis.  Patient wishes to remain full code and continue every available measure at this time.    -TTE shows EF less than 20% with severely decreased LV systolic function with moderately dilated left ventricular cavity.  No pericardial effusion.  A repeat limited echocardiogram to reassess valvular function and EF has been ordered and remains unchanged.    -Patient resumed on dobutamine per cardiology.    Acute thrombocytopenia  Heparin-induced thrombocytopenia  Disseminated intravascular coagulation    -Patient with development of thrombocytopenia this morning initially concerning for HIT however fibrinogen level and D-dimer level checked and less than 100 and greater than 20 respectively with an LDH greater than 700 highly consistent concerning for DIC despite no clinical evidence of bleeding at this time from any IV sites.    -Hematology/oncology consultation obtained given complicated situation given the fact the patient-now has a contraindication anticoagulation but also at the same time has a pulmonary embolism as well as having evidence of still ongoing liver dysfunction and cirrhosis with also atrial fibrillation/flutter.    -Transfuse for platelet count of 10-20,000 or platelet count less than 50,000 with signs of active bleeding or going for any procedures.    -Continue every 8 hour checks of hemoglobin, platelets, dimer, fibrinogen, LDH.    -Patient is essentially multiorgan failure at this point despite her respiratory status is at high risk for further decompensation and ultimately death.     -We will continue to administer cryoprecipitate to attempt to maintain fibrinogen level greater  than 100 and platelets greater than 20,000.    -HIT antibody ultimately returned positive, discussed again with hematology however given the above-noted still remains not a candidate for alternative anticoagulation and is likely to  from her situation.  Llaboratory derangements and her overall status recommends no further intervention time and patient    Acute kidney injury  Rhabdomyolysis  Hyperkalemia    -Suspected secondary to ATN and possible contrast-induced nephropathy.  Creatinine now trending back down at this time    -Nephrology consulted and following along, appreciate input and help    -Continue diuresis    Cirrhosis  Hyperbilirubinemia  Shock Liver with acute liver failure  Metabolic encephalopathy    -Continue lactulose, 30 g 3 times daily    -Liver enzymes have improved however patient's INR remains greater than 1.5, she has hyperbilirubinemia with no evidence of any obstructive process on repeat ultrasound and has still some mild encephalopathy.  Overall despite improving LFTs and now with her being in DIC patient's overall presentation is consistent with liver failure and her prognosis is quite poor.    Polysubstance abuse    -Patient positive for methamphetamines at admission    -Complicates all aspects of care and could be a significant contributor to patient's current situation and DSE.      VTE Prophylaxis:   Mechanical Order History:     None      Pharmalogical Order History:      Ordered     Dose Route Frequency Stop    21  heparin (porcine) 5000 UNIT/ML injection 5,000 Units         5,000 Units IV Once 21 0449    21  heparin 65337 units/250 mL (100 units/mL) in 0.45 % NaCl infusion  9.99 mL/hr         7.87 Units/kg/hr IV Titrated --    21  heparin (porcine) 5000 UNIT/ML injection 5,000 Units         5,000 Units IV As Needed --    21  heparin (porcine) 5000 UNIT/ML injection 2,500 Units         2,500 Units IV As Needed --                 Disposition taken off of [facility] transfer list due to no longer qualifying for any higher level of care intervention.  Patient's prognosis is very poor and disposition unclear at this time as patient is a extremely high risk for death.    Isael Barclay DO  [facility]  12/18/21  19:40 EST

## 2021-12-19 NOTE — NURSING NOTE
Code Blue at 0839; Present are Dr. Barclay; House supervisor, Carli Bolaños; Bruce Fisher, RN; Alesia Raza, EELNI; Macario Gonzalez, RRT.    0839 - PEA, CPR initiated  0840 - PEA, EPI, continue CPR  0842 - PEA, EPI, continue CPR  0844 - ROSC  0847 - Calcium Chloride  0852 - PEA, CPR initiated  0853 - PEA, EPI, continue CPR  0854 - PEA, Atropine, continue CPR  0856 - ROSC  0901 - PEA, EPI, CPR initiated  0902 - PEA, Atropine  0904 - ROSC  0908 - PEA, CRP initated  0909 - PEA, EPI, CPR continue  0910 - ROSC  0915 - PEA, EPI, CPR continue  0917 - ROSC  0919 - Sinus Ralph, Atropine  0920 - PEA, EPI; CPR initiated  0922 - ROSC  0924 - PEA, CPR intiated, EPI   0926 - ROSC

## 2021-12-20 NOTE — CASE MANAGEMENT/SOCIAL WORK
Case Management Discharge Note      Final Note: Patient  21.         Selected Continued Care - Discharged on 2021 Admission date: 2021 - Discharge disposition:         Final Discharge Disposition Code: 20 -